# Patient Record
Sex: MALE | Race: OTHER | HISPANIC OR LATINO | URBAN - METROPOLITAN AREA
[De-identification: names, ages, dates, MRNs, and addresses within clinical notes are randomized per-mention and may not be internally consistent; named-entity substitution may affect disease eponyms.]

---

## 2020-06-06 ENCOUNTER — INPATIENT (INPATIENT)
Facility: HOSPITAL | Age: 45
LOS: 3 days | Discharge: HOME | End: 2020-06-10
Attending: HOSPITALIST
Payer: MEDICAID

## 2020-06-06 PROCEDURE — 93970 EXTREMITY STUDY: CPT | Mod: 26

## 2020-06-06 PROCEDURE — 71045 X-RAY EXAM CHEST 1 VIEW: CPT | Mod: 26

## 2020-06-06 PROCEDURE — 74177 CT ABD & PELVIS W/CONTRAST: CPT | Mod: 26

## 2020-06-06 PROCEDURE — 99285 EMERGENCY DEPT VISIT HI MDM: CPT

## 2020-06-07 PROCEDURE — 76705 ECHO EXAM OF ABDOMEN: CPT | Mod: 26

## 2021-06-25 ENCOUNTER — INPATIENT (INPATIENT)
Facility: HOSPITAL | Age: 46
LOS: 4 days | Discharge: HOME | End: 2021-06-30
Admitting: STUDENT IN AN ORGANIZED HEALTH CARE EDUCATION/TRAINING PROGRAM
Payer: MEDICAID

## 2021-06-25 PROCEDURE — 74177 CT ABD & PELVIS W/CONTRAST: CPT | Mod: 26

## 2021-06-25 PROCEDURE — 76705 ECHO EXAM OF ABDOMEN: CPT | Mod: 26

## 2021-06-25 PROCEDURE — 93010 ELECTROCARDIOGRAM REPORT: CPT

## 2021-06-25 PROCEDURE — 99284 EMERGENCY DEPT VISIT MOD MDM: CPT

## 2021-06-25 PROCEDURE — 93970 EXTREMITY STUDY: CPT | Mod: 26

## 2021-06-28 PROCEDURE — 49083 ABD PARACENTESIS W/IMAGING: CPT

## 2022-01-01 ENCOUNTER — INPATIENT (INPATIENT)
Facility: HOSPITAL | Age: 47
LOS: 30 days | DRG: 981 | End: 2022-09-20
Attending: HOSPITALIST | Admitting: STUDENT IN AN ORGANIZED HEALTH CARE EDUCATION/TRAINING PROGRAM
Payer: MEDICAID

## 2022-01-01 VITALS
HEART RATE: 67 BPM | SYSTOLIC BLOOD PRESSURE: 130 MMHG | DIASTOLIC BLOOD PRESSURE: 80 MMHG | WEIGHT: 139.99 LBS | OXYGEN SATURATION: 99 % | TEMPERATURE: 98 F | RESPIRATION RATE: 20 BRPM

## 2022-01-01 VITALS
SYSTOLIC BLOOD PRESSURE: 68 MMHG | DIASTOLIC BLOOD PRESSURE: 36 MMHG | RESPIRATION RATE: 18 BRPM | HEART RATE: 52 BPM | OXYGEN SATURATION: 100 %

## 2022-01-01 DIAGNOSIS — R09.89 OTHER SPECIFIED SYMPTOMS AND SIGNS INVOLVING THE CIRCULATORY AND RESPIRATORY SYSTEMS: ICD-10-CM

## 2022-01-01 DIAGNOSIS — K70.30 ALCOHOLIC CIRRHOSIS OF LIVER WITHOUT ASCITES: ICD-10-CM

## 2022-01-01 DIAGNOSIS — K72.10 CHRONIC HEPATIC FAILURE WITHOUT COMA: ICD-10-CM

## 2022-01-01 LAB
% ALBUMIN: 45.2 % — SIGNIFICANT CHANGE UP
% ALPHA 1: 5.3 % — SIGNIFICANT CHANGE UP
% ALPHA 2: 5.1 % — SIGNIFICANT CHANGE UP
% BETA: 10.9 % — SIGNIFICANT CHANGE UP
% GAMMA: 33.5 % — SIGNIFICANT CHANGE UP
% M SPIKE: SIGNIFICANT CHANGE UP
ACANTHOCYTES BLD QL SMEAR: SIGNIFICANT CHANGE UP
ACANTHOCYTES BLD QL SMEAR: SLIGHT — SIGNIFICANT CHANGE UP
ACANTHOCYTES BLD QL SMEAR: SLIGHT — SIGNIFICANT CHANGE UP
AFP-TM SERPL-MCNC: 6.2 NG/ML — SIGNIFICANT CHANGE UP
ALBUMIN FLD-MCNC: <0.2 G/DL — SIGNIFICANT CHANGE UP
ALBUMIN SERPL ELPH-MCNC: 1.8 G/DL — LOW (ref 3.3–5.2)
ALBUMIN SERPL ELPH-MCNC: 2 G/DL — LOW (ref 3.3–5.2)
ALBUMIN SERPL ELPH-MCNC: 2.1 G/DL — LOW (ref 3.3–5.2)
ALBUMIN SERPL ELPH-MCNC: 2.2 G/DL — LOW (ref 3.3–5.2)
ALBUMIN SERPL ELPH-MCNC: 2.3 G/DL — LOW (ref 3.3–5.2)
ALBUMIN SERPL ELPH-MCNC: 2.3 G/DL — LOW (ref 3.3–5.2)
ALBUMIN SERPL ELPH-MCNC: 2.4 G/DL — LOW (ref 3.6–5.5)
ALBUMIN SERPL ELPH-MCNC: 2.6 G/DL — LOW (ref 3.3–5.2)
ALBUMIN SERPL ELPH-MCNC: 2.6 G/DL — LOW (ref 3.3–5.2)
ALBUMIN SERPL ELPH-MCNC: 2.8 G/DL — LOW (ref 3.3–5.2)
ALBUMIN SERPL ELPH-MCNC: 2.8 G/DL — LOW (ref 3.3–5.2)
ALBUMIN SERPL ELPH-MCNC: 3 G/DL — LOW (ref 3.3–5.2)
ALBUMIN SERPL ELPH-MCNC: 3 G/DL — LOW (ref 3.3–5.2)
ALBUMIN SERPL ELPH-MCNC: 3.2 G/DL — LOW (ref 3.3–5.2)
ALBUMIN SERPL ELPH-MCNC: 3.2 G/DL — LOW (ref 3.3–5.2)
ALBUMIN, RANDOM URINE: 8.3 MG/DL — SIGNIFICANT CHANGE UP
ALBUMIN/CREATININE RATIO (ACR): 159 MG/G — HIGH (ref 0–30)
ALBUMIN/GLOB SERPL ELPH: 0.8 RATIO — SIGNIFICANT CHANGE UP
ALP SERPL-CCNC: 226 U/L — HIGH (ref 40–120)
ALP SERPL-CCNC: 230 U/L — HIGH (ref 40–120)
ALP SERPL-CCNC: 236 U/L — HIGH (ref 40–120)
ALP SERPL-CCNC: 243 U/L — HIGH (ref 40–120)
ALP SERPL-CCNC: 252 U/L — HIGH (ref 40–120)
ALP SERPL-CCNC: 257 U/L — HIGH (ref 40–120)
ALP SERPL-CCNC: 259 U/L — HIGH (ref 40–120)
ALP SERPL-CCNC: 259 U/L — HIGH (ref 40–120)
ALP SERPL-CCNC: 261 U/L — HIGH (ref 40–120)
ALP SERPL-CCNC: 264 U/L — HIGH (ref 40–120)
ALP SERPL-CCNC: 264 U/L — HIGH (ref 40–120)
ALP SERPL-CCNC: 265 U/L — HIGH (ref 40–120)
ALP SERPL-CCNC: 272 U/L — HIGH (ref 40–120)
ALP SERPL-CCNC: 279 U/L — HIGH (ref 40–120)
ALP SERPL-CCNC: 281 U/L — HIGH (ref 40–120)
ALP SERPL-CCNC: 282 U/L — HIGH (ref 40–120)
ALP SERPL-CCNC: 284 U/L — HIGH (ref 40–120)
ALP SERPL-CCNC: 291 U/L — HIGH (ref 40–120)
ALP SERPL-CCNC: 294 U/L — HIGH (ref 40–120)
ALP SERPL-CCNC: 301 U/L — HIGH (ref 40–120)
ALP SERPL-CCNC: 303 U/L — HIGH (ref 40–120)
ALP SERPL-CCNC: 327 U/L — HIGH (ref 40–120)
ALP SERPL-CCNC: 329 U/L — HIGH (ref 40–120)
ALPHA1 GLOB SERPL ELPH-MCNC: 0.3 G/DL — SIGNIFICANT CHANGE UP (ref 0.1–0.4)
ALPHA2 GLOB SERPL ELPH-MCNC: 0.3 G/DL — LOW (ref 0.5–1)
ALT FLD-CCNC: 101 U/L — HIGH
ALT FLD-CCNC: 104 U/L — HIGH
ALT FLD-CCNC: 107 U/L — HIGH
ALT FLD-CCNC: 108 U/L — HIGH
ALT FLD-CCNC: 109 U/L — HIGH
ALT FLD-CCNC: 113 U/L — HIGH
ALT FLD-CCNC: 19 U/L — SIGNIFICANT CHANGE UP
ALT FLD-CCNC: 39 U/L — SIGNIFICANT CHANGE UP
ALT FLD-CCNC: 39 U/L — SIGNIFICANT CHANGE UP
ALT FLD-CCNC: 40 U/L — SIGNIFICANT CHANGE UP
ALT FLD-CCNC: 48 U/L — HIGH
ALT FLD-CCNC: 54 U/L — HIGH
ALT FLD-CCNC: 65 U/L — HIGH
ALT FLD-CCNC: 75 U/L — HIGH
ALT FLD-CCNC: 85 U/L — HIGH
ALT FLD-CCNC: 86 U/L — HIGH
ALT FLD-CCNC: 90 U/L — HIGH
ALT FLD-CCNC: 90 U/L — HIGH
ALT FLD-CCNC: 94 U/L — HIGH
ALT FLD-CCNC: 96 U/L — HIGH
ALT FLD-CCNC: 96 U/L — HIGH
ALT FLD-CCNC: 97 U/L — HIGH
ALT FLD-CCNC: 97 U/L — HIGH
AMMONIA BLD-MCNC: 54 UMOL/L — SIGNIFICANT CHANGE UP (ref 11–55)
AMMONIA BLD-MCNC: 55 UMOL/L — SIGNIFICANT CHANGE UP (ref 11–55)
AMMONIA BLD-MCNC: 61 UMOL/L — HIGH (ref 11–55)
AMMONIA BLD-MCNC: 69 UMOL/L — HIGH (ref 11–55)
AMMONIA BLD-MCNC: 76 UMOL/L — HIGH (ref 11–55)
AMMONIA BLD-MCNC: 85 UMOL/L — HIGH (ref 11–55)
AMMONIA BLD-MCNC: 87 UMOL/L — HIGH (ref 11–55)
ANION GAP SERPL CALC-SCNC: 10 MMOL/L — SIGNIFICANT CHANGE UP (ref 5–17)
ANION GAP SERPL CALC-SCNC: 11 MMOL/L — SIGNIFICANT CHANGE UP (ref 5–17)
ANION GAP SERPL CALC-SCNC: 12 MMOL/L — SIGNIFICANT CHANGE UP (ref 5–17)
ANION GAP SERPL CALC-SCNC: 13 MMOL/L — SIGNIFICANT CHANGE UP (ref 5–17)
ANION GAP SERPL CALC-SCNC: 13 MMOL/L — SIGNIFICANT CHANGE UP (ref 5–17)
ANION GAP SERPL CALC-SCNC: 14 MMOL/L — SIGNIFICANT CHANGE UP (ref 5–17)
ANION GAP SERPL CALC-SCNC: 14 MMOL/L — SIGNIFICANT CHANGE UP (ref 5–17)
ANION GAP SERPL CALC-SCNC: 16 MMOL/L — SIGNIFICANT CHANGE UP (ref 5–17)
ANION GAP SERPL CALC-SCNC: 18 MMOL/L — HIGH (ref 5–17)
ANION GAP SERPL CALC-SCNC: 18 MMOL/L — HIGH (ref 5–17)
ANION GAP SERPL CALC-SCNC: 19 MMOL/L — HIGH (ref 5–17)
ANION GAP SERPL CALC-SCNC: 22 MMOL/L — HIGH (ref 5–17)
ANION GAP SERPL CALC-SCNC: 22 MMOL/L — HIGH (ref 5–17)
ANION GAP SERPL CALC-SCNC: 23 MMOL/L — HIGH (ref 5–17)
ANION GAP SERPL CALC-SCNC: 25 MMOL/L — HIGH (ref 5–17)
ANION GAP SERPL CALC-SCNC: 9 MMOL/L — SIGNIFICANT CHANGE UP (ref 5–17)
ANISOCYTOSIS BLD QL: SIGNIFICANT CHANGE UP
ANISOCYTOSIS BLD QL: SLIGHT — SIGNIFICANT CHANGE UP
ANISOCYTOSIS BLD QL: SLIGHT — SIGNIFICANT CHANGE UP
APPEARANCE UR: ABNORMAL
APPEARANCE UR: CLEAR — SIGNIFICANT CHANGE UP
APPEARANCE UR: CLEAR — SIGNIFICANT CHANGE UP
APTT BLD: 100.6 SEC — HIGH (ref 27.5–35.5)
APTT BLD: 59.3 SEC — HIGH (ref 27.5–35.5)
APTT BLD: 61.4 SEC — HIGH (ref 27.5–35.5)
APTT BLD: 66.8 SEC — HIGH (ref 27.5–35.5)
APTT BLD: 73 SEC — HIGH (ref 27.5–35.5)
APTT BLD: 90.1 SEC — HIGH (ref 27.5–35.5)
APTT BLD: 98.2 SEC — HIGH (ref 27.5–35.5)
APTT BLD: 99.7 SEC — HIGH (ref 27.5–35.5)
AST SERPL-CCNC: 100 U/L — HIGH
AST SERPL-CCNC: 100 U/L — HIGH
AST SERPL-CCNC: 102 U/L — HIGH
AST SERPL-CCNC: 104 U/L — HIGH
AST SERPL-CCNC: 104 U/L — HIGH
AST SERPL-CCNC: 105 U/L — HIGH
AST SERPL-CCNC: 110 U/L — HIGH
AST SERPL-CCNC: 116 U/L — HIGH
AST SERPL-CCNC: 117 U/L — HIGH
AST SERPL-CCNC: 119 U/L — HIGH
AST SERPL-CCNC: 122 U/L — HIGH
AST SERPL-CCNC: 129 U/L — HIGH
AST SERPL-CCNC: 132 U/L — HIGH
AST SERPL-CCNC: 135 U/L — HIGH
AST SERPL-CCNC: 162 U/L — HIGH
AST SERPL-CCNC: 84 U/L — HIGH
AST SERPL-CCNC: 85 U/L — HIGH
AST SERPL-CCNC: 88 U/L — HIGH
AST SERPL-CCNC: 91 U/L — HIGH
AST SERPL-CCNC: 96 U/L — HIGH
AST SERPL-CCNC: 97 U/L — HIGH
AST SERPL-CCNC: 98 U/L — HIGH
AST SERPL-CCNC: 98 U/L — HIGH
B PERT IGG+IGM PNL SER: CLEAR — SIGNIFICANT CHANGE UP
B-GLOBULIN SERPL ELPH-MCNC: 0.6 G/DL — SIGNIFICANT CHANGE UP (ref 0.5–1)
BACTERIA # UR AUTO: ABNORMAL
BASO STIPL BLD QL SMEAR: PRESENT — SIGNIFICANT CHANGE UP
BASOPHILS # BLD AUTO: 0 K/UL — SIGNIFICANT CHANGE UP (ref 0–0.2)
BASOPHILS # BLD AUTO: 0.03 K/UL — SIGNIFICANT CHANGE UP (ref 0–0.2)
BASOPHILS # BLD AUTO: 0.27 K/UL — HIGH (ref 0–0.2)
BASOPHILS NFR BLD AUTO: 0 % — SIGNIFICANT CHANGE UP (ref 0–2)
BASOPHILS NFR BLD AUTO: 0.2 % — SIGNIFICANT CHANGE UP (ref 0–2)
BASOPHILS NFR BLD AUTO: 1.8 % — SIGNIFICANT CHANGE UP (ref 0–2)
BILIRUB DIRECT SERPL-MCNC: >10 MG/DL — HIGH (ref 0–0.3)
BILIRUB INDIRECT FLD-MCNC: SIGNIFICANT CHANGE UP MG/DL (ref 0.2–1)
BILIRUB SERPL-MCNC: 24.2 MG/DL — HIGH (ref 0.4–2)
BILIRUB SERPL-MCNC: 24.7 MG/DL — HIGH (ref 0.4–2)
BILIRUB SERPL-MCNC: 25.9 MG/DL — HIGH (ref 0.4–2)
BILIRUB SERPL-MCNC: 26.2 MG/DL — HIGH (ref 0.4–2)
BILIRUB SERPL-MCNC: 26.5 MG/DL — HIGH (ref 0.4–2)
BILIRUB SERPL-MCNC: 29.6 MG/DL — HIGH (ref 0.4–2)
BILIRUB SERPL-MCNC: 29.7 MG/DL — HIGH (ref 0.4–2)
BILIRUB SERPL-MCNC: 30.7 MG/DL — HIGH (ref 0.4–2)
BILIRUB SERPL-MCNC: 31.1 MG/DL — HIGH (ref 0.4–2)
BILIRUB SERPL-MCNC: 31.4 MG/DL — HIGH (ref 0.4–2)
BILIRUB SERPL-MCNC: 31.5 MG/DL — HIGH (ref 0.4–2)
BILIRUB SERPL-MCNC: 32 MG/DL — HIGH (ref 0.4–2)
BILIRUB SERPL-MCNC: 33.1 MG/DL — HIGH (ref 0.4–2)
BILIRUB SERPL-MCNC: 34.2 MG/DL — HIGH (ref 0.4–2)
BILIRUB SERPL-MCNC: 36.1 MG/DL — HIGH (ref 0.4–2)
BILIRUB SERPL-MCNC: 36.7 MG/DL — HIGH (ref 0.4–2)
BILIRUB SERPL-MCNC: 40.3 MG/DL — HIGH (ref 0.4–2)
BILIRUB SERPL-MCNC: 41.2 MG/DL — HIGH (ref 0.4–2)
BILIRUB SERPL-MCNC: 43 MG/DL — HIGH (ref 0.4–2)
BILIRUB SERPL-MCNC: 43.9 MG/DL — HIGH (ref 0.4–2)
BILIRUB SERPL-MCNC: 45.1 MG/DL — HIGH (ref 0.4–2)
BILIRUB SERPL-MCNC: 46.1 MG/DL — HIGH (ref 0.4–2)
BILIRUB SERPL-MCNC: 48.9 MG/DL — HIGH (ref 0.4–2)
BILIRUB UR-MCNC: ABNORMAL
BILIRUB UR-MCNC: ABNORMAL
BILIRUB UR-MCNC: NEGATIVE — SIGNIFICANT CHANGE UP
BLD GP AB SCN SERPL QL: SIGNIFICANT CHANGE UP
BUN SERPL-MCNC: 12.2 MG/DL — SIGNIFICANT CHANGE UP (ref 8–20)
BUN SERPL-MCNC: 12.7 MG/DL — SIGNIFICANT CHANGE UP (ref 8–20)
BUN SERPL-MCNC: 120.3 MG/DL — HIGH (ref 8–20)
BUN SERPL-MCNC: 123.7 MG/DL — HIGH (ref 8–20)
BUN SERPL-MCNC: 133.5 MG/DL — HIGH (ref 8–20)
BUN SERPL-MCNC: 139.4 MG/DL — HIGH (ref 8–20)
BUN SERPL-MCNC: 16.9 MG/DL — SIGNIFICANT CHANGE UP (ref 8–20)
BUN SERPL-MCNC: 162.8 MG/DL — HIGH (ref 8–20)
BUN SERPL-MCNC: 18.3 MG/DL — SIGNIFICANT CHANGE UP (ref 8–20)
BUN SERPL-MCNC: 23.3 MG/DL — HIGH (ref 8–20)
BUN SERPL-MCNC: 24 MG/DL — HIGH (ref 8–20)
BUN SERPL-MCNC: 24.3 MG/DL — HIGH (ref 8–20)
BUN SERPL-MCNC: 24.4 MG/DL — HIGH (ref 8–20)
BUN SERPL-MCNC: 31.5 MG/DL — HIGH (ref 8–20)
BUN SERPL-MCNC: 31.9 MG/DL — HIGH (ref 8–20)
BUN SERPL-MCNC: 33.6 MG/DL — HIGH (ref 8–20)
BUN SERPL-MCNC: 44.4 MG/DL — HIGH (ref 8–20)
BUN SERPL-MCNC: 48.9 MG/DL — HIGH (ref 8–20)
BUN SERPL-MCNC: 49 MG/DL — HIGH (ref 8–20)
BUN SERPL-MCNC: 49.7 MG/DL — HIGH (ref 8–20)
BUN SERPL-MCNC: 51.6 MG/DL — HIGH (ref 8–20)
BUN SERPL-MCNC: 58.7 MG/DL — HIGH (ref 8–20)
BUN SERPL-MCNC: 67.9 MG/DL — HIGH (ref 8–20)
BUN SERPL-MCNC: 7.3 MG/DL — LOW (ref 8–20)
BUN SERPL-MCNC: 78.4 MG/DL — HIGH (ref 8–20)
BUN SERPL-MCNC: 8.7 MG/DL — SIGNIFICANT CHANGE UP (ref 8–20)
BUN SERPL-MCNC: 82.4 MG/DL — HIGH (ref 8–20)
BUN SERPL-MCNC: 89.5 MG/DL — HIGH (ref 8–20)
BUN SERPL-MCNC: 9.1 MG/DL — SIGNIFICANT CHANGE UP (ref 8–20)
BURR CELLS BLD QL SMEAR: PRESENT — SIGNIFICANT CHANGE UP
CALCIUM SERPL-MCNC: 7.1 MG/DL — LOW (ref 8.4–10.5)
CALCIUM SERPL-MCNC: 7.2 MG/DL — LOW (ref 8.4–10.5)
CALCIUM SERPL-MCNC: 7.3 MG/DL — LOW (ref 8.4–10.5)
CALCIUM SERPL-MCNC: 7.3 MG/DL — LOW (ref 8.4–10.5)
CALCIUM SERPL-MCNC: 7.4 MG/DL — LOW (ref 8.4–10.5)
CALCIUM SERPL-MCNC: 7.5 MG/DL — LOW (ref 8.4–10.5)
CALCIUM SERPL-MCNC: 7.5 MG/DL — LOW (ref 8.4–10.5)
CALCIUM SERPL-MCNC: 7.6 MG/DL — LOW (ref 8.4–10.5)
CALCIUM SERPL-MCNC: 7.7 MG/DL — LOW (ref 8.4–10.5)
CALCIUM SERPL-MCNC: 7.8 MG/DL — LOW (ref 8.4–10.5)
CALCIUM SERPL-MCNC: 7.9 MG/DL — LOW (ref 8.4–10.5)
CALCIUM SERPL-MCNC: 7.9 MG/DL — LOW (ref 8.4–10.5)
CALCIUM SERPL-MCNC: 8 MG/DL — LOW (ref 8.4–10.5)
CALCIUM SERPL-MCNC: 8.1 MG/DL — LOW (ref 8.4–10.5)
CALCIUM SERPL-MCNC: 8.5 MG/DL — SIGNIFICANT CHANGE UP (ref 8.4–10.5)
CALCIUM SERPL-MCNC: 8.8 MG/DL — SIGNIFICANT CHANGE UP (ref 8.4–10.5)
CALCIUM SERPL-MCNC: 9.1 MG/DL — SIGNIFICANT CHANGE UP (ref 8.4–10.5)
CHLORIDE SERPL-SCNC: 100 MMOL/L — SIGNIFICANT CHANGE UP (ref 98–107)
CHLORIDE SERPL-SCNC: 101 MMOL/L — SIGNIFICANT CHANGE UP (ref 98–107)
CHLORIDE SERPL-SCNC: 81 MMOL/L — LOW (ref 98–107)
CHLORIDE SERPL-SCNC: 84 MMOL/L — LOW (ref 98–107)
CHLORIDE SERPL-SCNC: 85 MMOL/L — LOW (ref 98–107)
CHLORIDE SERPL-SCNC: 85 MMOL/L — LOW (ref 98–107)
CHLORIDE SERPL-SCNC: 86 MMOL/L — LOW (ref 98–107)
CHLORIDE SERPL-SCNC: 88 MMOL/L — LOW (ref 98–107)
CHLORIDE SERPL-SCNC: 89 MMOL/L — LOW (ref 98–107)
CHLORIDE SERPL-SCNC: 90 MMOL/L — LOW (ref 98–107)
CHLORIDE SERPL-SCNC: 90 MMOL/L — LOW (ref 98–107)
CHLORIDE SERPL-SCNC: 92 MMOL/L — LOW (ref 98–107)
CHLORIDE SERPL-SCNC: 92 MMOL/L — LOW (ref 98–107)
CHLORIDE SERPL-SCNC: 93 MMOL/L — LOW (ref 98–107)
CHLORIDE SERPL-SCNC: 94 MMOL/L — LOW (ref 98–107)
CHLORIDE SERPL-SCNC: 95 MMOL/L — LOW (ref 98–107)
CHLORIDE SERPL-SCNC: 95 MMOL/L — LOW (ref 98–107)
CHLORIDE SERPL-SCNC: 96 MMOL/L — LOW (ref 98–107)
CHLORIDE SERPL-SCNC: 96 MMOL/L — LOW (ref 98–107)
CHLORIDE SERPL-SCNC: 97 MMOL/L — LOW (ref 98–107)
CHLORIDE SERPL-SCNC: 98 MMOL/L — SIGNIFICANT CHANGE UP (ref 98–107)
CHLORIDE SERPL-SCNC: 99 MMOL/L — SIGNIFICANT CHANGE UP (ref 98–107)
CHOLEST SERPL-MCNC: 75 MG/DL — SIGNIFICANT CHANGE UP
CO2 SERPL-SCNC: 14 MMOL/L — LOW (ref 22–29)
CO2 SERPL-SCNC: 14 MMOL/L — LOW (ref 22–29)
CO2 SERPL-SCNC: 16 MMOL/L — LOW (ref 22–29)
CO2 SERPL-SCNC: 17 MMOL/L — LOW (ref 22–29)
CO2 SERPL-SCNC: 18 MMOL/L — LOW (ref 22–29)
CO2 SERPL-SCNC: 18 MMOL/L — LOW (ref 22–29)
CO2 SERPL-SCNC: 19 MMOL/L — LOW (ref 22–29)
CO2 SERPL-SCNC: 20 MMOL/L — LOW (ref 22–29)
CO2 SERPL-SCNC: 20 MMOL/L — LOW (ref 22–29)
CO2 SERPL-SCNC: 21 MMOL/L — LOW (ref 22–29)
CO2 SERPL-SCNC: 21 MMOL/L — LOW (ref 22–29)
CO2 SERPL-SCNC: 22 MMOL/L — SIGNIFICANT CHANGE UP (ref 22–29)
CO2 SERPL-SCNC: 23 MMOL/L — SIGNIFICANT CHANGE UP (ref 22–29)
CO2 SERPL-SCNC: 24 MMOL/L — SIGNIFICANT CHANGE UP (ref 22–29)
CO2 SERPL-SCNC: 25 MMOL/L — SIGNIFICANT CHANGE UP (ref 22–29)
CO2 SERPL-SCNC: 28 MMOL/L — SIGNIFICANT CHANGE UP (ref 22–29)
CO2 SERPL-SCNC: 28 MMOL/L — SIGNIFICANT CHANGE UP (ref 22–29)
COLOR FLD: YELLOW
COLOR SPEC: ABNORMAL
COLOR SPEC: YELLOW — SIGNIFICANT CHANGE UP
COLOR SPEC: YELLOW — SIGNIFICANT CHANGE UP
COMMENT - URINE: SIGNIFICANT CHANGE UP
CORTIS AM PEAK SERPL-MCNC: 14.8 UG/DL — SIGNIFICANT CHANGE UP (ref 6–18.4)
CREAT ?TM UR-MCNC: 52 MG/DL — SIGNIFICANT CHANGE UP
CREAT ?TM UR-MCNC: 55 MG/DL — SIGNIFICANT CHANGE UP
CREAT SERPL-MCNC: 0.3 MG/DL — LOW (ref 0.5–1.3)
CREAT SERPL-MCNC: 0.31 MG/DL — LOW (ref 0.5–1.3)
CREAT SERPL-MCNC: 0.36 MG/DL — LOW (ref 0.5–1.3)
CREAT SERPL-MCNC: 0.5 MG/DL — SIGNIFICANT CHANGE UP (ref 0.5–1.3)
CREAT SERPL-MCNC: 0.57 MG/DL — SIGNIFICANT CHANGE UP (ref 0.5–1.3)
CREAT SERPL-MCNC: 1 MG/DL — SIGNIFICANT CHANGE UP (ref 0.5–1.3)
CREAT SERPL-MCNC: 1.06 MG/DL — SIGNIFICANT CHANGE UP (ref 0.5–1.3)
CREAT SERPL-MCNC: 1.08 MG/DL — SIGNIFICANT CHANGE UP (ref 0.5–1.3)
CREAT SERPL-MCNC: 1.34 MG/DL — HIGH (ref 0.5–1.3)
CREAT SERPL-MCNC: 1.67 MG/DL — HIGH (ref 0.5–1.3)
CREAT SERPL-MCNC: 1.98 MG/DL — HIGH (ref 0.5–1.3)
CREAT SERPL-MCNC: 2.7 MG/DL — HIGH (ref 0.5–1.3)
CREAT SERPL-MCNC: 2.95 MG/DL — HIGH (ref 0.5–1.3)
CREAT SERPL-MCNC: 3.42 MG/DL — HIGH (ref 0.5–1.3)
CREAT SERPL-MCNC: 4.87 MG/DL — HIGH (ref 0.5–1.3)
CREAT SERPL-MCNC: 5.04 MG/DL — HIGH (ref 0.5–1.3)
CREAT SERPL-MCNC: 5.18 MG/DL — HIGH (ref 0.5–1.3)
CREAT SERPL-MCNC: 5.4 MG/DL — HIGH (ref 0.5–1.3)
CREAT SERPL-MCNC: 5.77 MG/DL — HIGH (ref 0.5–1.3)
CREAT SERPL-MCNC: <0.2 MG/DL — LOW (ref 0.5–1.3)
CULTURE RESULTS: SIGNIFICANT CHANGE UP
DACRYOCYTES BLD QL SMEAR: SLIGHT — SIGNIFICANT CHANGE UP
DIFF PNL FLD: ABNORMAL
DIFF PNL FLD: ABNORMAL
DIFF PNL FLD: NEGATIVE — SIGNIFICANT CHANGE UP
EGFR: 11 ML/MIN/1.73M2 — LOW
EGFR: 12 ML/MIN/1.73M2 — LOW
EGFR: 122 ML/MIN/1.73M2 — SIGNIFICANT CHANGE UP
EGFR: 127 ML/MIN/1.73M2 — SIGNIFICANT CHANGE UP
EGFR: 13 ML/MIN/1.73M2 — LOW
EGFR: 13 ML/MIN/1.73M2 — LOW
EGFR: 14 ML/MIN/1.73M2 — LOW
EGFR: 140 ML/MIN/1.73M2 — SIGNIFICANT CHANGE UP
EGFR: 146 ML/MIN/1.73M2 — SIGNIFICANT CHANGE UP
EGFR: 148 ML/MIN/1.73M2 — SIGNIFICANT CHANGE UP
EGFR: 167 ML/MIN/1.73M2 — SIGNIFICANT CHANGE UP
EGFR: 21 ML/MIN/1.73M2 — LOW
EGFR: 26 ML/MIN/1.73M2 — LOW
EGFR: 28 ML/MIN/1.73M2 — LOW
EGFR: 41 ML/MIN/1.73M2 — LOW
EGFR: 50 ML/MIN/1.73M2 — LOW
EGFR: 66 ML/MIN/1.73M2 — SIGNIFICANT CHANGE UP
EGFR: 85 ML/MIN/1.73M2 — SIGNIFICANT CHANGE UP
EGFR: 87 ML/MIN/1.73M2 — SIGNIFICANT CHANGE UP
EGFR: 93 ML/MIN/1.73M2 — SIGNIFICANT CHANGE UP
EOSINOPHIL # BLD AUTO: 0 K/UL — SIGNIFICANT CHANGE UP (ref 0–0.5)
EOSINOPHIL # BLD AUTO: 0.01 K/UL — SIGNIFICANT CHANGE UP (ref 0–0.5)
EOSINOPHIL # BLD AUTO: 0.12 K/UL — SIGNIFICANT CHANGE UP (ref 0–0.5)
EOSINOPHIL # BLD AUTO: 0.21 K/UL — SIGNIFICANT CHANGE UP (ref 0–0.5)
EOSINOPHIL # BLD AUTO: 0.26 K/UL — SIGNIFICANT CHANGE UP (ref 0–0.5)
EOSINOPHIL NFR BLD AUTO: 0 % — SIGNIFICANT CHANGE UP (ref 0–6)
EOSINOPHIL NFR BLD AUTO: 0.1 % — SIGNIFICANT CHANGE UP (ref 0–6)
EOSINOPHIL NFR BLD AUTO: 0.9 % — SIGNIFICANT CHANGE UP (ref 0–6)
EOSINOPHIL NFR BLD AUTO: 1.4 % — SIGNIFICANT CHANGE UP (ref 0–6)
EOSINOPHIL NFR BLD AUTO: 1.7 % — SIGNIFICANT CHANGE UP (ref 0–6)
EPI CELLS # UR: SIGNIFICANT CHANGE UP
FERRITIN SERPL-MCNC: 4185 NG/ML — HIGH (ref 30–400)
FIBRINOGEN PPP-MCNC: 262 MG/DL — LOW (ref 330–520)
FLUID INTAKE SUBSTANCE CLASS: SIGNIFICANT CHANGE UP
FOLATE SERPL-MCNC: 12.5 NG/ML — SIGNIFICANT CHANGE UP
GAMMA GLOBULIN: 1.8 G/DL — HIGH (ref 0.6–1.6)
GLUCOSE FLD-MCNC: 158 MG/DL — SIGNIFICANT CHANGE UP
GLUCOSE SERPL-MCNC: 100 MG/DL — HIGH (ref 70–99)
GLUCOSE SERPL-MCNC: 107 MG/DL — HIGH (ref 70–99)
GLUCOSE SERPL-MCNC: 107 MG/DL — HIGH (ref 70–99)
GLUCOSE SERPL-MCNC: 111 MG/DL — HIGH (ref 70–99)
GLUCOSE SERPL-MCNC: 111 MG/DL — HIGH (ref 70–99)
GLUCOSE SERPL-MCNC: 118 MG/DL — HIGH (ref 70–99)
GLUCOSE SERPL-MCNC: 118 MG/DL — HIGH (ref 70–99)
GLUCOSE SERPL-MCNC: 120 MG/DL — HIGH (ref 70–99)
GLUCOSE SERPL-MCNC: 121 MG/DL — HIGH (ref 70–99)
GLUCOSE SERPL-MCNC: 122 MG/DL — HIGH (ref 70–99)
GLUCOSE SERPL-MCNC: 124 MG/DL — HIGH (ref 70–99)
GLUCOSE SERPL-MCNC: 125 MG/DL — HIGH (ref 70–99)
GLUCOSE SERPL-MCNC: 129 MG/DL — HIGH (ref 70–99)
GLUCOSE SERPL-MCNC: 130 MG/DL — HIGH (ref 70–99)
GLUCOSE SERPL-MCNC: 133 MG/DL — HIGH (ref 70–99)
GLUCOSE SERPL-MCNC: 136 MG/DL — HIGH (ref 70–99)
GLUCOSE SERPL-MCNC: 138 MG/DL — HIGH (ref 70–99)
GLUCOSE SERPL-MCNC: 139 MG/DL — HIGH (ref 70–99)
GLUCOSE SERPL-MCNC: 141 MG/DL — HIGH (ref 70–99)
GLUCOSE SERPL-MCNC: 142 MG/DL — HIGH (ref 70–99)
GLUCOSE SERPL-MCNC: 144 MG/DL — HIGH (ref 70–99)
GLUCOSE SERPL-MCNC: 144 MG/DL — HIGH (ref 70–99)
GLUCOSE SERPL-MCNC: 149 MG/DL — HIGH (ref 70–99)
GLUCOSE SERPL-MCNC: 151 MG/DL — HIGH (ref 70–99)
GLUCOSE SERPL-MCNC: 151 MG/DL — HIGH (ref 70–99)
GLUCOSE SERPL-MCNC: 154 MG/DL — HIGH (ref 70–99)
GLUCOSE SERPL-MCNC: 161 MG/DL — HIGH (ref 70–99)
GLUCOSE UR QL: NEGATIVE MG/DL — SIGNIFICANT CHANGE UP
GLUCOSE UR QL: NEGATIVE — SIGNIFICANT CHANGE UP
GLUCOSE UR QL: NEGATIVE — SIGNIFICANT CHANGE UP
GRAM STN FLD: SIGNIFICANT CHANGE UP
GRAN CASTS # UR COMP ASSIST: ABNORMAL /LPF
GRAN CASTS # UR COMP ASSIST: ABNORMAL /LPF
HAV IGM SER-ACNC: SIGNIFICANT CHANGE UP
HBV CORE IGM SER-ACNC: SIGNIFICANT CHANGE UP
HBV SURFACE AG SER-ACNC: SIGNIFICANT CHANGE UP
HCT VFR BLD CALC: 15.8 % — CRITICAL LOW (ref 39–50)
HCT VFR BLD CALC: 20.4 % — CRITICAL LOW (ref 39–50)
HCT VFR BLD CALC: 20.5 % — CRITICAL LOW (ref 39–50)
HCT VFR BLD CALC: 20.8 % — CRITICAL LOW (ref 39–50)
HCT VFR BLD CALC: 23.3 % — LOW (ref 39–50)
HCT VFR BLD CALC: 24 % — LOW (ref 39–50)
HCT VFR BLD CALC: 24.5 % — LOW (ref 39–50)
HCT VFR BLD CALC: 25.1 % — LOW (ref 39–50)
HCT VFR BLD CALC: 26.8 % — LOW (ref 39–50)
HCT VFR BLD CALC: 27.9 % — LOW (ref 39–50)
HCT VFR BLD CALC: 28.1 % — LOW (ref 39–50)
HCT VFR BLD CALC: 28.5 % — LOW (ref 39–50)
HCT VFR BLD CALC: 28.8 % — LOW (ref 39–50)
HCT VFR BLD CALC: 28.9 % — LOW (ref 39–50)
HCT VFR BLD CALC: 28.9 % — LOW (ref 39–50)
HCT VFR BLD CALC: 29.1 % — LOW (ref 39–50)
HCT VFR BLD CALC: 29.4 % — LOW (ref 39–50)
HCT VFR BLD CALC: 29.6 % — LOW (ref 39–50)
HCT VFR BLD CALC: 29.6 % — LOW (ref 39–50)
HCT VFR BLD CALC: 30 % — LOW (ref 39–50)
HCT VFR BLD CALC: 30.6 % — LOW (ref 39–50)
HCT VFR BLD CALC: 30.6 % — LOW (ref 39–50)
HCT VFR BLD CALC: 30.7 % — LOW (ref 39–50)
HCT VFR BLD CALC: 31.2 % — LOW (ref 39–50)
HCT VFR BLD CALC: 31.7 % — LOW (ref 39–50)
HCT VFR BLD CALC: 32.1 % — LOW (ref 39–50)
HCT VFR BLD CALC: 32.2 % — LOW (ref 39–50)
HCT VFR BLD CALC: 32.4 % — LOW (ref 39–50)
HCV AB S/CO SERPL IA: 0.17 S/CO — SIGNIFICANT CHANGE UP (ref 0–0.99)
HCV AB SERPL-IMP: SIGNIFICANT CHANGE UP
HDLC SERPL-MCNC: 12 MG/DL — LOW
HGB BLD-MCNC: 10 G/DL — LOW (ref 13–17)
HGB BLD-MCNC: 10.2 G/DL — LOW (ref 13–17)
HGB BLD-MCNC: 10.4 G/DL — LOW (ref 13–17)
HGB BLD-MCNC: 10.5 G/DL — LOW (ref 13–17)
HGB BLD-MCNC: 10.6 G/DL — LOW (ref 13–17)
HGB BLD-MCNC: 10.6 G/DL — LOW (ref 13–17)
HGB BLD-MCNC: 11.2 G/DL — LOW (ref 13–17)
HGB BLD-MCNC: 11.2 G/DL — LOW (ref 13–17)
HGB BLD-MCNC: 11.3 G/DL — LOW (ref 13–17)
HGB BLD-MCNC: 11.4 G/DL — LOW (ref 13–17)
HGB BLD-MCNC: 11.4 G/DL — LOW (ref 13–17)
HGB BLD-MCNC: 11.8 G/DL — LOW (ref 13–17)
HGB BLD-MCNC: 5.6 G/DL — CRITICAL LOW (ref 13–17)
HGB BLD-MCNC: 7.3 G/DL — LOW (ref 13–17)
HGB BLD-MCNC: 7.4 G/DL — LOW (ref 13–17)
HGB BLD-MCNC: 7.6 G/DL — LOW (ref 13–17)
HGB BLD-MCNC: 8.6 G/DL — LOW (ref 13–17)
HGB BLD-MCNC: 8.8 G/DL — LOW (ref 13–17)
HGB BLD-MCNC: 9.1 G/DL — LOW (ref 13–17)
HGB BLD-MCNC: 9.2 G/DL — LOW (ref 13–17)
HGB BLD-MCNC: 9.4 G/DL — LOW (ref 13–17)
HGB BLD-MCNC: 9.5 G/DL — LOW (ref 13–17)
HGB BLD-MCNC: 9.6 G/DL — LOW (ref 13–17)
HGB BLD-MCNC: 9.6 G/DL — LOW (ref 13–17)
HYALINE CASTS # UR AUTO: ABNORMAL /LPF
IMM GRANULOCYTES NFR BLD AUTO: 2 % — HIGH (ref 0–0.9)
IMM GRANULOCYTES NFR BLD AUTO: 2.3 % — HIGH (ref 0–1.5)
IMM GRANULOCYTES NFR BLD AUTO: 3.5 % — HIGH (ref 0–1.5)
INR BLD: 2.02 RATIO — HIGH (ref 0.88–1.16)
INR BLD: 2.06 RATIO — HIGH (ref 0.88–1.16)
INR BLD: 2.22 RATIO — HIGH (ref 0.88–1.16)
INR BLD: 2.26 RATIO — HIGH (ref 0.88–1.16)
INR BLD: 2.28 RATIO — HIGH (ref 0.88–1.16)
INR BLD: 2.3 RATIO — HIGH (ref 0.88–1.16)
INR BLD: 2.33 RATIO — HIGH (ref 0.88–1.16)
INR BLD: 2.36 RATIO — HIGH (ref 0.88–1.16)
INR BLD: 2.39 RATIO — HIGH (ref 0.88–1.16)
INR BLD: 2.53 RATIO — HIGH (ref 0.88–1.16)
INR BLD: 2.56 RATIO — HIGH (ref 0.88–1.16)
INR BLD: 2.58 RATIO — HIGH (ref 0.88–1.16)
INR BLD: 2.58 RATIO — HIGH (ref 0.88–1.16)
INR BLD: 2.67 RATIO — HIGH (ref 0.88–1.16)
INR BLD: 2.72 RATIO — HIGH (ref 0.88–1.16)
INR BLD: 2.82 RATIO — HIGH (ref 0.88–1.16)
INR BLD: 2.82 RATIO — HIGH (ref 0.88–1.16)
INR BLD: 2.83 RATIO — HIGH (ref 0.88–1.16)
INR BLD: 2.9 RATIO — HIGH (ref 0.88–1.16)
INR BLD: 2.96 RATIO — HIGH (ref 0.88–1.16)
INR BLD: 3.09 RATIO — HIGH (ref 0.88–1.16)
INR BLD: 3.14 RATIO — HIGH (ref 0.88–1.16)
INR BLD: 3.36 RATIO — HIGH (ref 0.88–1.16)
INTERPRETATION SERPL IFE-IMP: SIGNIFICANT CHANGE UP
IRON SATN MFR SERPL: 46 % — SIGNIFICANT CHANGE UP (ref 16–55)
IRON SATN MFR SERPL: 59 UG/DL — SIGNIFICANT CHANGE UP (ref 59–158)
KAPPA LC SER QL IFE: 10.51 MG/DL — HIGH (ref 0.33–1.94)
KAPPA/LAMBDA FREE LIGHT CHAIN RATIO, SERUM: 0.96 RATIO — SIGNIFICANT CHANGE UP (ref 0.26–1.65)
KETONES UR-MCNC: NEGATIVE — SIGNIFICANT CHANGE UP
LAMBDA LC SER QL IFE: 10.92 MG/DL — HIGH (ref 0.57–2.63)
LDH SERPL L TO P-CCNC: 26 U/L — SIGNIFICANT CHANGE UP
LEUKOCYTE ESTERASE UR-ACNC: ABNORMAL
LEUKOCYTE ESTERASE UR-ACNC: ABNORMAL
LEUKOCYTE ESTERASE UR-ACNC: NEGATIVE — SIGNIFICANT CHANGE UP
LIDOCAIN IGE QN: 52 U/L — HIGH (ref 22–51)
LIPID PNL WITH DIRECT LDL SERPL: SIGNIFICANT CHANGE UP MG/DL
LYMPHOCYTES # BLD AUTO: 0.43 K/UL — LOW (ref 1–3.3)
LYMPHOCYTES # BLD AUTO: 0.53 K/UL — LOW (ref 1–3.3)
LYMPHOCYTES # BLD AUTO: 0.57 K/UL — LOW (ref 1–3.3)
LYMPHOCYTES # BLD AUTO: 0.65 K/UL — LOW (ref 1–3.3)
LYMPHOCYTES # BLD AUTO: 0.73 K/UL — LOW (ref 1–3.3)
LYMPHOCYTES # BLD AUTO: 0.91 K/UL — LOW (ref 1–3.3)
LYMPHOCYTES # BLD AUTO: 0.99 K/UL — LOW (ref 1–3.3)
LYMPHOCYTES # BLD AUTO: 1.19 K/UL — SIGNIFICANT CHANGE UP (ref 1–3.3)
LYMPHOCYTES # BLD AUTO: 2.6 % — LOW (ref 13–44)
LYMPHOCYTES # BLD AUTO: 3.5 % — LOW (ref 13–44)
LYMPHOCYTES # BLD AUTO: 3.5 % — LOW (ref 13–44)
LYMPHOCYTES # BLD AUTO: 4.2 % — LOW (ref 13–44)
LYMPHOCYTES # BLD AUTO: 5.4 % — LOW (ref 13–44)
LYMPHOCYTES # BLD AUTO: 6.5 % — LOW (ref 13–44)
LYMPHOCYTES # BLD AUTO: 6.9 % — LOW (ref 13–44)
LYMPHOCYTES # BLD AUTO: 7.8 % — LOW (ref 13–44)
LYMPHOCYTES # FLD: 22 % — SIGNIFICANT CHANGE UP
M-SPIKE: SIGNIFICANT CHANGE UP (ref 0–0)
MACROCYTES BLD QL: SIGNIFICANT CHANGE UP
MACROCYTES BLD QL: SLIGHT — SIGNIFICANT CHANGE UP
MACROCYTES BLD QL: SLIGHT — SIGNIFICANT CHANGE UP
MAGNESIUM SERPL-MCNC: 1.8 MG/DL — SIGNIFICANT CHANGE UP (ref 1.6–2.6)
MAGNESIUM SERPL-MCNC: 1.9 MG/DL — SIGNIFICANT CHANGE UP (ref 1.6–2.6)
MAGNESIUM SERPL-MCNC: 1.9 MG/DL — SIGNIFICANT CHANGE UP (ref 1.6–2.6)
MAGNESIUM SERPL-MCNC: 2.1 MG/DL — SIGNIFICANT CHANGE UP (ref 1.8–2.6)
MAGNESIUM SERPL-MCNC: 2.2 MG/DL — SIGNIFICANT CHANGE UP (ref 1.6–2.6)
MAGNESIUM SERPL-MCNC: 2.3 MG/DL — SIGNIFICANT CHANGE UP (ref 1.6–2.6)
MAGNESIUM SERPL-MCNC: 2.7 MG/DL — HIGH (ref 1.8–2.6)
MANUAL SMEAR VERIFICATION: SIGNIFICANT CHANGE UP
MCHC RBC-ENTMCNC: 32.7 GM/DL — SIGNIFICANT CHANGE UP (ref 32–36)
MCHC RBC-ENTMCNC: 33.3 PG — SIGNIFICANT CHANGE UP (ref 27–34)
MCHC RBC-ENTMCNC: 33.8 PG — SIGNIFICANT CHANGE UP (ref 27–34)
MCHC RBC-ENTMCNC: 34.1 GM/DL — SIGNIFICANT CHANGE UP (ref 32–36)
MCHC RBC-ENTMCNC: 34.2 GM/DL — SIGNIFICANT CHANGE UP (ref 32–36)
MCHC RBC-ENTMCNC: 34.3 GM/DL — SIGNIFICANT CHANGE UP (ref 32–36)
MCHC RBC-ENTMCNC: 34.3 GM/DL — SIGNIFICANT CHANGE UP (ref 32–36)
MCHC RBC-ENTMCNC: 34.3 PG — HIGH (ref 27–34)
MCHC RBC-ENTMCNC: 34.4 GM/DL — SIGNIFICANT CHANGE UP (ref 32–36)
MCHC RBC-ENTMCNC: 34.4 PG — HIGH (ref 27–34)
MCHC RBC-ENTMCNC: 34.4 PG — HIGH (ref 27–34)
MCHC RBC-ENTMCNC: 34.5 GM/DL — SIGNIFICANT CHANGE UP (ref 32–36)
MCHC RBC-ENTMCNC: 34.6 GM/DL — SIGNIFICANT CHANGE UP (ref 32–36)
MCHC RBC-ENTMCNC: 34.7 GM/DL — SIGNIFICANT CHANGE UP (ref 32–36)
MCHC RBC-ENTMCNC: 34.9 PG — HIGH (ref 27–34)
MCHC RBC-ENTMCNC: 35.1 GM/DL — SIGNIFICANT CHANGE UP (ref 32–36)
MCHC RBC-ENTMCNC: 35.1 GM/DL — SIGNIFICANT CHANGE UP (ref 32–36)
MCHC RBC-ENTMCNC: 35.3 GM/DL — SIGNIFICANT CHANGE UP (ref 32–36)
MCHC RBC-ENTMCNC: 35.4 GM/DL — SIGNIFICANT CHANGE UP (ref 32–36)
MCHC RBC-ENTMCNC: 35.4 PG — HIGH (ref 27–34)
MCHC RBC-ENTMCNC: 35.5 GM/DL — SIGNIFICANT CHANGE UP (ref 32–36)
MCHC RBC-ENTMCNC: 35.5 PG — HIGH (ref 27–34)
MCHC RBC-ENTMCNC: 35.6 PG — HIGH (ref 27–34)
MCHC RBC-ENTMCNC: 35.6 PG — HIGH (ref 27–34)
MCHC RBC-ENTMCNC: 35.7 PG — HIGH (ref 27–34)
MCHC RBC-ENTMCNC: 35.8 GM/DL — SIGNIFICANT CHANGE UP (ref 32–36)
MCHC RBC-ENTMCNC: 35.8 GM/DL — SIGNIFICANT CHANGE UP (ref 32–36)
MCHC RBC-ENTMCNC: 35.8 PG — HIGH (ref 27–34)
MCHC RBC-ENTMCNC: 35.9 GM/DL — SIGNIFICANT CHANGE UP (ref 32–36)
MCHC RBC-ENTMCNC: 35.9 PG — HIGH (ref 27–34)
MCHC RBC-ENTMCNC: 35.9 PG — HIGH (ref 27–34)
MCHC RBC-ENTMCNC: 36 GM/DL — SIGNIFICANT CHANGE UP (ref 32–36)
MCHC RBC-ENTMCNC: 36 GM/DL — SIGNIFICANT CHANGE UP (ref 32–36)
MCHC RBC-ENTMCNC: 36 PG — HIGH (ref 27–34)
MCHC RBC-ENTMCNC: 36.1 GM/DL — HIGH (ref 32–36)
MCHC RBC-ENTMCNC: 36.1 PG — HIGH (ref 27–34)
MCHC RBC-ENTMCNC: 36.2 PG — HIGH (ref 27–34)
MCHC RBC-ENTMCNC: 36.3 GM/DL — HIGH (ref 32–36)
MCHC RBC-ENTMCNC: 36.5 GM/DL — HIGH (ref 32–36)
MCHC RBC-ENTMCNC: 36.5 PG — HIGH (ref 27–34)
MCHC RBC-ENTMCNC: 36.5 PG — HIGH (ref 27–34)
MCHC RBC-ENTMCNC: 36.6 GM/DL — HIGH (ref 32–36)
MCHC RBC-ENTMCNC: 36.6 PG — HIGH (ref 27–34)
MCHC RBC-ENTMCNC: 36.7 GM/DL — HIGH (ref 32–36)
MCHC RBC-ENTMCNC: 36.7 GM/DL — HIGH (ref 32–36)
MCHC RBC-ENTMCNC: 36.7 PG — HIGH (ref 27–34)
MCHC RBC-ENTMCNC: 36.8 GM/DL — HIGH (ref 32–36)
MCHC RBC-ENTMCNC: 36.8 PG — HIGH (ref 27–34)
MCHC RBC-ENTMCNC: 36.8 PG — HIGH (ref 27–34)
MCHC RBC-ENTMCNC: 36.9 GM/DL — HIGH (ref 32–36)
MCHC RBC-ENTMCNC: 36.9 PG — HIGH (ref 27–34)
MCHC RBC-ENTMCNC: 37.1 GM/DL — HIGH (ref 32–36)
MCV RBC AUTO: 100.3 FL — HIGH (ref 80–100)
MCV RBC AUTO: 100.3 FL — HIGH (ref 80–100)
MCV RBC AUTO: 100.6 FL — HIGH (ref 80–100)
MCV RBC AUTO: 101.1 FL — HIGH (ref 80–100)
MCV RBC AUTO: 102.2 FL — HIGH (ref 80–100)
MCV RBC AUTO: 102.3 FL — HIGH (ref 80–100)
MCV RBC AUTO: 102.6 FL — HIGH (ref 80–100)
MCV RBC AUTO: 102.6 FL — HIGH (ref 80–100)
MCV RBC AUTO: 102.8 FL — HIGH (ref 80–100)
MCV RBC AUTO: 103.2 FL — HIGH (ref 80–100)
MCV RBC AUTO: 103.4 FL — HIGH (ref 80–100)
MCV RBC AUTO: 103.6 FL — HIGH (ref 80–100)
MCV RBC AUTO: 103.6 FL — HIGH (ref 80–100)
MCV RBC AUTO: 104.2 FL — HIGH (ref 80–100)
MCV RBC AUTO: 104.9 FL — HIGH (ref 80–100)
MCV RBC AUTO: 104.9 FL — HIGH (ref 80–100)
MCV RBC AUTO: 105.2 FL — HIGH (ref 80–100)
MCV RBC AUTO: 106.3 FL — HIGH (ref 80–100)
MCV RBC AUTO: 108.9 FL — HIGH (ref 80–100)
MCV RBC AUTO: 92.8 FL — SIGNIFICANT CHANGE UP (ref 80–100)
MCV RBC AUTO: 94 FL — SIGNIFICANT CHANGE UP (ref 80–100)
MCV RBC AUTO: 94.1 FL — SIGNIFICANT CHANGE UP (ref 80–100)
MCV RBC AUTO: 94.4 FL — SIGNIFICANT CHANGE UP (ref 80–100)
MCV RBC AUTO: 95.3 FL — SIGNIFICANT CHANGE UP (ref 80–100)
MCV RBC AUTO: 96.5 FL — SIGNIFICANT CHANGE UP (ref 80–100)
MCV RBC AUTO: 96.8 FL — SIGNIFICANT CHANGE UP (ref 80–100)
MCV RBC AUTO: 96.9 FL — SIGNIFICANT CHANGE UP (ref 80–100)
MCV RBC AUTO: 98.3 FL — SIGNIFICANT CHANGE UP (ref 80–100)
MELD SCORE WITH DIALYSIS: >40 POINTS — SIGNIFICANT CHANGE UP
MELD SCORE WITHOUT DIALYSIS: 33 POINTS — SIGNIFICANT CHANGE UP
METAMYELOCYTES # FLD: 0.9 % — HIGH (ref 0–0)
METAMYELOCYTES # FLD: 0.9 % — HIGH (ref 0–0)
MONOCYTES # BLD AUTO: 0.14 K/UL — SIGNIFICANT CHANGE UP (ref 0–0.9)
MONOCYTES # BLD AUTO: 0.53 K/UL — SIGNIFICANT CHANGE UP (ref 0–0.9)
MONOCYTES # BLD AUTO: 0.57 K/UL — SIGNIFICANT CHANGE UP (ref 0–0.9)
MONOCYTES # BLD AUTO: 0.72 K/UL — SIGNIFICANT CHANGE UP (ref 0–0.9)
MONOCYTES # BLD AUTO: 0.81 K/UL — SIGNIFICANT CHANGE UP (ref 0–0.9)
MONOCYTES # BLD AUTO: 0.91 K/UL — HIGH (ref 0–0.9)
MONOCYTES # BLD AUTO: 1.12 K/UL — HIGH (ref 0–0.9)
MONOCYTES # BLD AUTO: 1.33 K/UL — HIGH (ref 0–0.9)
MONOCYTES NFR BLD AUTO: 0.9 % — LOW (ref 2–14)
MONOCYTES NFR BLD AUTO: 3.5 % — SIGNIFICANT CHANGE UP (ref 2–14)
MONOCYTES NFR BLD AUTO: 3.5 % — SIGNIFICANT CHANGE UP (ref 2–14)
MONOCYTES NFR BLD AUTO: 4.4 % — SIGNIFICANT CHANGE UP (ref 2–14)
MONOCYTES NFR BLD AUTO: 5.8 % — SIGNIFICANT CHANGE UP (ref 2–14)
MONOCYTES NFR BLD AUTO: 6.1 % — SIGNIFICANT CHANGE UP (ref 2–14)
MONOCYTES NFR BLD AUTO: 8.3 % — SIGNIFICANT CHANGE UP (ref 2–14)
MONOCYTES NFR BLD AUTO: 8.8 % — SIGNIFICANT CHANGE UP (ref 2–14)
MONOS+MACROS # FLD: 68 % — SIGNIFICANT CHANGE UP
MYELOCYTES NFR BLD: 0.8 % — HIGH (ref 0–0)
MYELOCYTES NFR BLD: 1.7 % — HIGH (ref 0–0)
MYELOCYTES NFR BLD: 2.6 % — HIGH (ref 0–0)
NEUTROPHILS # BLD AUTO: 11.02 K/UL — HIGH (ref 1.8–7.4)
NEUTROPHILS # BLD AUTO: 11.29 K/UL — HIGH (ref 1.8–7.4)
NEUTROPHILS # BLD AUTO: 12.21 K/UL — HIGH (ref 1.8–7.4)
NEUTROPHILS # BLD AUTO: 13.47 K/UL — HIGH (ref 1.8–7.4)
NEUTROPHILS # BLD AUTO: 13.66 K/UL — HIGH (ref 1.8–7.4)
NEUTROPHILS # BLD AUTO: 13.84 K/UL — HIGH (ref 1.8–7.4)
NEUTROPHILS # BLD AUTO: 14.75 K/UL — HIGH (ref 1.8–7.4)
NEUTROPHILS # BLD AUTO: 14.8 K/UL — HIGH (ref 1.8–7.4)
NEUTROPHILS NFR BLD AUTO: 78.3 % — HIGH (ref 43–77)
NEUTROPHILS NFR BLD AUTO: 80.8 % — HIGH (ref 43–77)
NEUTROPHILS NFR BLD AUTO: 84.1 % — HIGH (ref 43–77)
NEUTROPHILS NFR BLD AUTO: 85.2 % — HIGH (ref 43–77)
NEUTROPHILS NFR BLD AUTO: 86.2 % — HIGH (ref 43–77)
NEUTROPHILS NFR BLD AUTO: 90.4 % — HIGH (ref 43–77)
NEUTROPHILS NFR BLD AUTO: 91.2 % — HIGH (ref 43–77)
NEUTROPHILS NFR BLD AUTO: 91.3 % — HIGH (ref 43–77)
NEUTROPHILS-BODY FLUID: 10 % — SIGNIFICANT CHANGE UP
NEUTS BAND # BLD: 4.4 % — SIGNIFICANT CHANGE UP (ref 0–8)
NEUTS BAND # BLD: 5.2 % — SIGNIFICANT CHANGE UP (ref 0–8)
NITRITE UR-MCNC: NEGATIVE — SIGNIFICANT CHANGE UP
NITRITE UR-MCNC: POSITIVE
NITRITE UR-MCNC: POSITIVE
NON HDL CHOLESTEROL: 63 MG/DL — SIGNIFICANT CHANGE UP
NON-GYNECOLOGICAL CYTOLOGY STUDY: SIGNIFICANT CHANGE UP
NRBC # BLD: 1 /100 — HIGH (ref 0–0)
NRBC # BLD: 1 /100 — HIGH (ref 0–0)
OSMOLALITY SERPL: 298 MOSMOL/KG — SIGNIFICANT CHANGE UP (ref 275–300)
OSMOLALITY SERPL: 300 MOSMOL/KG — SIGNIFICANT CHANGE UP (ref 275–300)
OSMOLALITY UR: 342 MOSM/KG — SIGNIFICANT CHANGE UP (ref 300–1000)
OVALOCYTES BLD QL SMEAR: SLIGHT — SIGNIFICANT CHANGE UP
PH UR: 6 — SIGNIFICANT CHANGE UP (ref 5–8)
PH UR: 6 — SIGNIFICANT CHANGE UP (ref 5–8)
PH UR: 7 — SIGNIFICANT CHANGE UP (ref 5–8)
PHOSPHATE SERPL-MCNC: 2.5 MG/DL — SIGNIFICANT CHANGE UP (ref 2.4–4.7)
PLAT MORPH BLD: NORMAL — SIGNIFICANT CHANGE UP
PLATELET # BLD AUTO: 35 K/UL — LOW (ref 150–400)
PLATELET # BLD AUTO: 39 K/UL — LOW (ref 150–400)
PLATELET # BLD AUTO: 39 K/UL — LOW (ref 150–400)
PLATELET # BLD AUTO: 40 K/UL — LOW (ref 150–400)
PLATELET # BLD AUTO: 40 K/UL — LOW (ref 150–400)
PLATELET # BLD AUTO: 41 K/UL — LOW (ref 150–400)
PLATELET # BLD AUTO: 44 K/UL — LOW (ref 150–400)
PLATELET # BLD AUTO: 45 K/UL — LOW (ref 150–400)
PLATELET # BLD AUTO: 48 K/UL — LOW (ref 150–400)
PLATELET # BLD AUTO: 48 K/UL — LOW (ref 150–400)
PLATELET # BLD AUTO: 50 K/UL — LOW (ref 150–400)
PLATELET # BLD AUTO: 51 K/UL — LOW (ref 150–400)
PLATELET # BLD AUTO: 51 K/UL — LOW (ref 150–400)
PLATELET # BLD AUTO: 52 K/UL — LOW (ref 150–400)
PLATELET # BLD AUTO: 53 K/UL — LOW (ref 150–400)
PLATELET # BLD AUTO: 54 K/UL — LOW (ref 150–400)
PLATELET # BLD AUTO: 55 K/UL — LOW (ref 150–400)
PLATELET # BLD AUTO: 57 K/UL — LOW (ref 150–400)
PLATELET # BLD AUTO: 58 K/UL — LOW (ref 150–400)
PLATELET # BLD AUTO: 58 K/UL — LOW (ref 150–400)
PLATELET # BLD AUTO: 61 K/UL — LOW (ref 150–400)
PLATELET # BLD AUTO: 67 K/UL — LOW (ref 150–400)
PLATELET # BLD AUTO: 69 K/UL — LOW (ref 150–400)
PLATELET # BLD AUTO: 73 K/UL — LOW (ref 150–400)
POIKILOCYTOSIS BLD QL AUTO: SIGNIFICANT CHANGE UP
POIKILOCYTOSIS BLD QL AUTO: SLIGHT — SIGNIFICANT CHANGE UP
POLYCHROMASIA BLD QL SMEAR: SIGNIFICANT CHANGE UP
POLYCHROMASIA BLD QL SMEAR: SIGNIFICANT CHANGE UP
POLYCHROMASIA BLD QL SMEAR: SLIGHT — SIGNIFICANT CHANGE UP
POTASSIUM SERPL-MCNC: 3.1 MMOL/L — LOW (ref 3.5–5.3)
POTASSIUM SERPL-MCNC: 3.3 MMOL/L — LOW (ref 3.5–5.3)
POTASSIUM SERPL-MCNC: 3.4 MMOL/L — LOW (ref 3.5–5.3)
POTASSIUM SERPL-MCNC: 3.5 MMOL/L — SIGNIFICANT CHANGE UP (ref 3.5–5.3)
POTASSIUM SERPL-MCNC: 3.6 MMOL/L — SIGNIFICANT CHANGE UP (ref 3.5–5.3)
POTASSIUM SERPL-MCNC: 3.7 MMOL/L — SIGNIFICANT CHANGE UP (ref 3.5–5.3)
POTASSIUM SERPL-MCNC: 3.8 MMOL/L — SIGNIFICANT CHANGE UP (ref 3.5–5.3)
POTASSIUM SERPL-MCNC: 3.8 MMOL/L — SIGNIFICANT CHANGE UP (ref 3.5–5.3)
POTASSIUM SERPL-MCNC: 3.9 MMOL/L — SIGNIFICANT CHANGE UP (ref 3.5–5.3)
POTASSIUM SERPL-MCNC: 4 MMOL/L — SIGNIFICANT CHANGE UP (ref 3.5–5.3)
POTASSIUM SERPL-MCNC: 4 MMOL/L — SIGNIFICANT CHANGE UP (ref 3.5–5.3)
POTASSIUM SERPL-MCNC: 4.1 MMOL/L — SIGNIFICANT CHANGE UP (ref 3.5–5.3)
POTASSIUM SERPL-MCNC: 4.2 MMOL/L — SIGNIFICANT CHANGE UP (ref 3.5–5.3)
POTASSIUM SERPL-MCNC: 4.3 MMOL/L — SIGNIFICANT CHANGE UP (ref 3.5–5.3)
POTASSIUM SERPL-MCNC: 4.4 MMOL/L — SIGNIFICANT CHANGE UP (ref 3.5–5.3)
POTASSIUM SERPL-MCNC: 4.6 MMOL/L — SIGNIFICANT CHANGE UP (ref 3.5–5.3)
POTASSIUM SERPL-SCNC: 3.1 MMOL/L — LOW (ref 3.5–5.3)
POTASSIUM SERPL-SCNC: 3.3 MMOL/L — LOW (ref 3.5–5.3)
POTASSIUM SERPL-SCNC: 3.4 MMOL/L — LOW (ref 3.5–5.3)
POTASSIUM SERPL-SCNC: 3.5 MMOL/L — SIGNIFICANT CHANGE UP (ref 3.5–5.3)
POTASSIUM SERPL-SCNC: 3.6 MMOL/L — SIGNIFICANT CHANGE UP (ref 3.5–5.3)
POTASSIUM SERPL-SCNC: 3.7 MMOL/L — SIGNIFICANT CHANGE UP (ref 3.5–5.3)
POTASSIUM SERPL-SCNC: 3.8 MMOL/L — SIGNIFICANT CHANGE UP (ref 3.5–5.3)
POTASSIUM SERPL-SCNC: 3.8 MMOL/L — SIGNIFICANT CHANGE UP (ref 3.5–5.3)
POTASSIUM SERPL-SCNC: 3.9 MMOL/L — SIGNIFICANT CHANGE UP (ref 3.5–5.3)
POTASSIUM SERPL-SCNC: 4 MMOL/L — SIGNIFICANT CHANGE UP (ref 3.5–5.3)
POTASSIUM SERPL-SCNC: 4 MMOL/L — SIGNIFICANT CHANGE UP (ref 3.5–5.3)
POTASSIUM SERPL-SCNC: 4.1 MMOL/L — SIGNIFICANT CHANGE UP (ref 3.5–5.3)
POTASSIUM SERPL-SCNC: 4.2 MMOL/L — SIGNIFICANT CHANGE UP (ref 3.5–5.3)
POTASSIUM SERPL-SCNC: 4.3 MMOL/L — SIGNIFICANT CHANGE UP (ref 3.5–5.3)
POTASSIUM SERPL-SCNC: 4.4 MMOL/L — SIGNIFICANT CHANGE UP (ref 3.5–5.3)
POTASSIUM SERPL-SCNC: 4.6 MMOL/L — SIGNIFICANT CHANGE UP (ref 3.5–5.3)
PROT ?TM UR-MCNC: 49 MG/DL — HIGH (ref 0–12)
PROT FLD-MCNC: <1 G/DL — SIGNIFICANT CHANGE UP
PROT PATTERN SERPL ELPH-IMP: SIGNIFICANT CHANGE UP
PROT SERPL-MCNC: 4.5 G/DL — LOW (ref 6.6–8.7)
PROT SERPL-MCNC: 4.6 G/DL — LOW (ref 6.6–8.7)
PROT SERPL-MCNC: 4.6 G/DL — LOW (ref 6.6–8.7)
PROT SERPL-MCNC: 4.8 G/DL — LOW (ref 6.6–8.7)
PROT SERPL-MCNC: 4.9 G/DL — LOW (ref 6.6–8.7)
PROT SERPL-MCNC: 4.9 G/DL — LOW (ref 6.6–8.7)
PROT SERPL-MCNC: 5 G/DL — LOW (ref 6.6–8.7)
PROT SERPL-MCNC: 5.1 G/DL — LOW (ref 6.6–8.7)
PROT SERPL-MCNC: 5.2 G/DL — LOW (ref 6.6–8.7)
PROT SERPL-MCNC: 5.3 G/DL — LOW (ref 6.6–8.7)
PROT SERPL-MCNC: 5.3 G/DL — LOW (ref 6.6–8.7)
PROT SERPL-MCNC: 5.4 G/DL — LOW (ref 6.6–8.7)
PROT SERPL-MCNC: 5.4 G/DL — LOW (ref 6.6–8.7)
PROT SERPL-MCNC: 5.4 G/DL — LOW (ref 6–8.3)
PROT SERPL-MCNC: 5.4 G/DL — LOW (ref 6–8.3)
PROT SERPL-MCNC: 5.5 G/DL — LOW (ref 6.6–8.7)
PROT SERPL-MCNC: 5.6 G/DL — LOW (ref 6.6–8.7)
PROT SERPL-MCNC: 5.6 G/DL — LOW (ref 6.6–8.7)
PROT SERPL-MCNC: 5.8 G/DL — LOW (ref 6.6–8.7)
PROT SERPL-MCNC: 5.8 G/DL — LOW (ref 6.6–8.7)
PROT SERPL-MCNC: 5.9 G/DL — LOW (ref 6.6–8.7)
PROT SERPL-MCNC: 6.2 G/DL — LOW (ref 6.6–8.7)
PROT UR-MCNC: 15 MG/DL
PROT UR-MCNC: 30 MG/DL
PROT UR-MCNC: SIGNIFICANT CHANGE UP MG/DL
PROT/CREAT UR-RTO: 0.9 RATIO — HIGH
PROTHROM AB SERPL-ACNC: 23.6 SEC — HIGH (ref 10.5–13.4)
PROTHROM AB SERPL-ACNC: 24.1 SEC — HIGH (ref 10.5–13.4)
PROTHROM AB SERPL-ACNC: 26 SEC — HIGH (ref 10.5–13.4)
PROTHROM AB SERPL-ACNC: 26.4 SEC — HIGH (ref 10.5–13.4)
PROTHROM AB SERPL-ACNC: 26.7 SEC — HIGH (ref 10.5–13.4)
PROTHROM AB SERPL-ACNC: 26.9 SEC — HIGH (ref 10.5–13.4)
PROTHROM AB SERPL-ACNC: 27.3 SEC — HIGH (ref 10.5–13.4)
PROTHROM AB SERPL-ACNC: 27.6 SEC — HIGH (ref 10.5–13.4)
PROTHROM AB SERPL-ACNC: 28 SEC — HIGH (ref 10.5–13.4)
PROTHROM AB SERPL-ACNC: 29.6 SEC — HIGH (ref 10.5–13.4)
PROTHROM AB SERPL-ACNC: 30 SEC — HIGH (ref 10.5–13.4)
PROTHROM AB SERPL-ACNC: 30.2 SEC — HIGH (ref 10.5–13.4)
PROTHROM AB SERPL-ACNC: 30.2 SEC — HIGH (ref 10.5–13.4)
PROTHROM AB SERPL-ACNC: 31.3 SEC — HIGH (ref 10.5–13.4)
PROTHROM AB SERPL-ACNC: 31.9 SEC — HIGH (ref 10.5–13.4)
PROTHROM AB SERPL-ACNC: 33 SEC — HIGH (ref 10.5–13.4)
PROTHROM AB SERPL-ACNC: 33.1 SEC — HIGH (ref 10.5–13.4)
PROTHROM AB SERPL-ACNC: 33.2 SEC — HIGH (ref 10.5–13.4)
PROTHROM AB SERPL-ACNC: 34 SEC — HIGH (ref 10.5–13.4)
PROTHROM AB SERPL-ACNC: 34.7 SEC — HIGH (ref 10.5–13.4)
PROTHROM AB SERPL-ACNC: 36.3 SEC — HIGH (ref 10.5–13.4)
PROTHROM AB SERPL-ACNC: 36.9 SEC — HIGH (ref 10.5–13.4)
PROTHROM AB SERPL-ACNC: 39.5 SEC — HIGH (ref 10.5–13.4)
RAPID RVP RESULT: SIGNIFICANT CHANGE UP
RBC # BLD: 1.68 M/UL — LOW (ref 4.2–5.8)
RBC # BLD: 2.15 M/UL — LOW (ref 4.2–5.8)
RBC # BLD: 2.16 M/UL — LOW (ref 4.2–5.8)
RBC # BLD: 2.21 M/UL — LOW (ref 4.2–5.8)
RBC # BLD: 2.48 M/UL — LOW (ref 4.2–5.8)
RBC # BLD: 2.51 M/UL — LOW (ref 4.2–5.8)
RBC # BLD: 2.54 M/UL — LOW (ref 4.2–5.8)
RBC # BLD: 2.59 M/UL — LOW (ref 4.2–5.8)
RBC # BLD: 2.62 M/UL — LOW (ref 4.2–5.8)
RBC # BLD: 2.66 M/UL — LOW (ref 4.2–5.8)
RBC # BLD: 2.7 M/UL — LOW (ref 4.2–5.8)
RBC # BLD: 2.75 M/UL — LOW (ref 4.2–5.8)
RBC # BLD: 2.78 M/UL — LOW (ref 4.2–5.8)
RBC # BLD: 2.81 M/UL — LOW (ref 4.2–5.8)
RBC # BLD: 2.82 M/UL — LOW (ref 4.2–5.8)
RBC # BLD: 2.84 M/UL — LOW (ref 4.2–5.8)
RBC # BLD: 2.88 M/UL — LOW (ref 4.2–5.8)
RBC # BLD: 2.9 M/UL — LOW (ref 4.2–5.8)
RBC # BLD: 2.91 M/UL — LOW (ref 4.2–5.8)
RBC # BLD: 2.94 M/UL — LOW (ref 4.2–5.8)
RBC # BLD: 3.04 M/UL — LOW (ref 4.2–5.8)
RBC # BLD: 3.06 M/UL — LOW (ref 4.2–5.8)
RBC # BLD: 3.09 M/UL — LOW (ref 4.2–5.8)
RBC # BLD: 3.09 M/UL — LOW (ref 4.2–5.8)
RBC # BLD: 3.11 M/UL — LOW (ref 4.2–5.8)
RBC # BLD: 3.2 M/UL — LOW (ref 4.2–5.8)
RBC # FLD: 15.4 % — HIGH (ref 10.3–14.5)
RBC # FLD: 15.6 % — HIGH (ref 10.3–14.5)
RBC # FLD: 15.8 % — HIGH (ref 10.3–14.5)
RBC # FLD: 15.9 % — HIGH (ref 10.3–14.5)
RBC # FLD: 16 % — HIGH (ref 10.3–14.5)
RBC # FLD: 16.1 % — HIGH (ref 10.3–14.5)
RBC # FLD: 16.2 % — HIGH (ref 10.3–14.5)
RBC # FLD: 16.2 % — HIGH (ref 10.3–14.5)
RBC # FLD: 16.3 % — HIGH (ref 10.3–14.5)
RBC # FLD: 16.3 % — HIGH (ref 10.3–14.5)
RBC # FLD: 17.9 % — HIGH (ref 10.3–14.5)
RBC # FLD: 18.2 % — HIGH (ref 10.3–14.5)
RBC # FLD: 18.2 % — HIGH (ref 10.3–14.5)
RBC # FLD: 18.4 % — HIGH (ref 10.3–14.5)
RBC BLD AUTO: ABNORMAL
RBC CASTS # UR COMP ASSIST: ABNORMAL /HPF (ref 0–4)
RBC CASTS # UR COMP ASSIST: ABNORMAL /HPF (ref 0–4)
RBC CASTS # UR COMP ASSIST: NEGATIVE /HPF — SIGNIFICANT CHANGE UP (ref 0–4)
RCV VOL RI: <1000 /UL — HIGH (ref 0–0)
SARS-COV-2 RNA SPEC QL NAA+PROBE: SIGNIFICANT CHANGE UP
SCHISTOCYTES BLD QL AUTO: SLIGHT — SIGNIFICANT CHANGE UP
SODIUM SERPL-SCNC: 121 MMOL/L — LOW (ref 135–145)
SODIUM SERPL-SCNC: 122 MMOL/L — LOW (ref 135–145)
SODIUM SERPL-SCNC: 123 MMOL/L — LOW (ref 135–145)
SODIUM SERPL-SCNC: 124 MMOL/L — LOW (ref 135–145)
SODIUM SERPL-SCNC: 126 MMOL/L — LOW (ref 135–145)
SODIUM SERPL-SCNC: 127 MMOL/L — LOW (ref 135–145)
SODIUM SERPL-SCNC: 128 MMOL/L — LOW (ref 135–145)
SODIUM SERPL-SCNC: 129 MMOL/L — LOW (ref 135–145)
SODIUM SERPL-SCNC: 130 MMOL/L — LOW (ref 135–145)
SODIUM SERPL-SCNC: 134 MMOL/L — LOW (ref 135–145)
SODIUM SERPL-SCNC: 136 MMOL/L — SIGNIFICANT CHANGE UP (ref 135–145)
SODIUM SERPL-SCNC: 138 MMOL/L — SIGNIFICANT CHANGE UP (ref 135–145)
SODIUM UR-SCNC: <30 MMOL/L — SIGNIFICANT CHANGE UP
SODIUM UR-SCNC: <30 MMOL/L — SIGNIFICANT CHANGE UP
SP GR SPEC: 1.01 — SIGNIFICANT CHANGE UP (ref 1.01–1.02)
SP GR SPEC: 1.01 — SIGNIFICANT CHANGE UP (ref 1.01–1.02)
SP GR SPEC: 1.02 — SIGNIFICANT CHANGE UP (ref 1.01–1.02)
SPECIMEN SOURCE: SIGNIFICANT CHANGE UP
TARGETS BLD QL SMEAR: SLIGHT — SIGNIFICANT CHANGE UP
TARGETS BLD QL SMEAR: SLIGHT — SIGNIFICANT CHANGE UP
TIBC SERPL-MCNC: 129 UG/DL — LOW (ref 220–430)
TOTAL NUCLEATED CELL COUNT, BODY FLUID: 179 /UL — SIGNIFICANT CHANGE UP
TRIGL SERPL-MCNC: 102 MG/DL — SIGNIFICANT CHANGE UP
TROPONIN T SERPL-MCNC: <0.01 NG/ML — SIGNIFICANT CHANGE UP (ref 0–0.06)
TSH SERPL-MCNC: 2.93 UIU/ML — SIGNIFICANT CHANGE UP (ref 0.27–4.2)
TUBE TYPE: SIGNIFICANT CHANGE UP
UROBILINOGEN FLD QL: 4
UROBILINOGEN FLD QL: 8
UROBILINOGEN FLD QL: NEGATIVE MG/DL — SIGNIFICANT CHANGE UP
VIT B12 SERPL-MCNC: >2000 PG/ML — HIGH (ref 232–1245)
WBC # BLD: 12.06 K/UL — HIGH (ref 3.8–10.5)
WBC # BLD: 13.2 K/UL — HIGH (ref 3.8–10.5)
WBC # BLD: 13.44 K/UL — HIGH (ref 3.8–10.5)
WBC # BLD: 13.62 K/UL — HIGH (ref 3.8–10.5)
WBC # BLD: 13.8 K/UL — HIGH (ref 3.8–10.5)
WBC # BLD: 14.84 K/UL — HIGH (ref 3.8–10.5)
WBC # BLD: 14.86 K/UL — HIGH (ref 3.8–10.5)
WBC # BLD: 15.12 K/UL — HIGH (ref 3.8–10.5)
WBC # BLD: 15.17 K/UL — HIGH (ref 3.8–10.5)
WBC # BLD: 15.25 K/UL — HIGH (ref 3.8–10.5)
WBC # BLD: 15.32 K/UL — HIGH (ref 3.8–10.5)
WBC # BLD: 15.62 K/UL — HIGH (ref 3.8–10.5)
WBC # BLD: 15.95 K/UL — HIGH (ref 3.8–10.5)
WBC # BLD: 16.14 K/UL — HIGH (ref 3.8–10.5)
WBC # BLD: 16.16 K/UL — HIGH (ref 3.8–10.5)
WBC # BLD: 16.37 K/UL — HIGH (ref 3.8–10.5)
WBC # BLD: 16.45 K/UL — HIGH (ref 3.8–10.5)
WBC # BLD: 16.47 K/UL — HIGH (ref 3.8–10.5)
WBC # BLD: 16.71 K/UL — HIGH (ref 3.8–10.5)
WBC # BLD: 16.72 K/UL — HIGH (ref 3.8–10.5)
WBC # BLD: 16.8 K/UL — HIGH (ref 3.8–10.5)
WBC # BLD: 16.9 K/UL — HIGH (ref 3.8–10.5)
WBC # BLD: 17.22 K/UL — HIGH (ref 3.8–10.5)
WBC # BLD: 17.44 K/UL — HIGH (ref 3.8–10.5)
WBC # BLD: 18.55 K/UL — HIGH (ref 3.8–10.5)
WBC # BLD: 18.7 K/UL — HIGH (ref 3.8–10.5)
WBC # BLD: 18.8 K/UL — HIGH (ref 3.8–10.5)
WBC # BLD: 19.46 K/UL — HIGH (ref 3.8–10.5)
WBC # FLD AUTO: 12.06 K/UL — HIGH (ref 3.8–10.5)
WBC # FLD AUTO: 13.2 K/UL — HIGH (ref 3.8–10.5)
WBC # FLD AUTO: 13.44 K/UL — HIGH (ref 3.8–10.5)
WBC # FLD AUTO: 13.62 K/UL — HIGH (ref 3.8–10.5)
WBC # FLD AUTO: 13.8 K/UL — HIGH (ref 3.8–10.5)
WBC # FLD AUTO: 14.84 K/UL — HIGH (ref 3.8–10.5)
WBC # FLD AUTO: 14.86 K/UL — HIGH (ref 3.8–10.5)
WBC # FLD AUTO: 15.12 K/UL — HIGH (ref 3.8–10.5)
WBC # FLD AUTO: 15.17 K/UL — HIGH (ref 3.8–10.5)
WBC # FLD AUTO: 15.25 K/UL — HIGH (ref 3.8–10.5)
WBC # FLD AUTO: 15.32 K/UL — HIGH (ref 3.8–10.5)
WBC # FLD AUTO: 15.62 K/UL — HIGH (ref 3.8–10.5)
WBC # FLD AUTO: 15.95 K/UL — HIGH (ref 3.8–10.5)
WBC # FLD AUTO: 16.14 K/UL — HIGH (ref 3.8–10.5)
WBC # FLD AUTO: 16.16 K/UL — HIGH (ref 3.8–10.5)
WBC # FLD AUTO: 16.37 K/UL — HIGH (ref 3.8–10.5)
WBC # FLD AUTO: 16.45 K/UL — HIGH (ref 3.8–10.5)
WBC # FLD AUTO: 16.47 K/UL — HIGH (ref 3.8–10.5)
WBC # FLD AUTO: 16.71 K/UL — HIGH (ref 3.8–10.5)
WBC # FLD AUTO: 16.72 K/UL — HIGH (ref 3.8–10.5)
WBC # FLD AUTO: 16.8 K/UL — HIGH (ref 3.8–10.5)
WBC # FLD AUTO: 16.9 K/UL — HIGH (ref 3.8–10.5)
WBC # FLD AUTO: 17.22 K/UL — HIGH (ref 3.8–10.5)
WBC # FLD AUTO: 17.44 K/UL — HIGH (ref 3.8–10.5)
WBC # FLD AUTO: 18.55 K/UL — HIGH (ref 3.8–10.5)
WBC # FLD AUTO: 18.7 K/UL — HIGH (ref 3.8–10.5)
WBC # FLD AUTO: 18.8 K/UL — HIGH (ref 3.8–10.5)
WBC # FLD AUTO: 19.46 K/UL — HIGH (ref 3.8–10.5)
WBC UR QL: NEGATIVE /HPF — SIGNIFICANT CHANGE UP (ref 0–5)
WBC UR QL: SIGNIFICANT CHANGE UP /HPF (ref 0–5)
WBC UR QL: SIGNIFICANT CHANGE UP /HPF (ref 0–5)

## 2022-01-01 PROCEDURE — 87070 CULTURE OTHR SPECIMN AEROBIC: CPT

## 2022-01-01 PROCEDURE — 76705 ECHO EXAM OF ABDOMEN: CPT | Mod: 26

## 2022-01-01 PROCEDURE — 86985 SPLIT BLOOD OR PRODUCTS: CPT

## 2022-01-01 PROCEDURE — 99233 SBSQ HOSP IP/OBS HIGH 50: CPT

## 2022-01-01 PROCEDURE — 76705 ECHO EXAM OF ABDOMEN: CPT

## 2022-01-01 PROCEDURE — 99232 SBSQ HOSP IP/OBS MODERATE 35: CPT

## 2022-01-01 PROCEDURE — 84155 ASSAY OF PROTEIN SERUM: CPT

## 2022-01-01 PROCEDURE — 99497 ADVNCD CARE PLAN 30 MIN: CPT | Mod: 25

## 2022-01-01 PROCEDURE — 83615 LACTATE (LD) (LDH) ENZYME: CPT

## 2022-01-01 PROCEDURE — U0005: CPT

## 2022-01-01 PROCEDURE — P9016: CPT

## 2022-01-01 PROCEDURE — 82746 ASSAY OF FOLIC ACID SERUM: CPT

## 2022-01-01 PROCEDURE — 85025 COMPLETE CBC W/AUTO DIFF WBC: CPT

## 2022-01-01 PROCEDURE — 85730 THROMBOPLASTIN TIME PARTIAL: CPT

## 2022-01-01 PROCEDURE — 83550 IRON BINDING TEST: CPT

## 2022-01-01 PROCEDURE — 82607 VITAMIN B-12: CPT

## 2022-01-01 PROCEDURE — 74183 MRI ABD W/O CNTR FLWD CNTR: CPT

## 2022-01-01 PROCEDURE — 86334 IMMUNOFIX E-PHORESIS SERUM: CPT

## 2022-01-01 PROCEDURE — 84484 ASSAY OF TROPONIN QUANT: CPT

## 2022-01-01 PROCEDURE — 86923 COMPATIBILITY TEST ELECTRIC: CPT

## 2022-01-01 PROCEDURE — P9011: CPT

## 2022-01-01 PROCEDURE — 93005 ELECTROCARDIOGRAM TRACING: CPT

## 2022-01-01 PROCEDURE — 86850 RBC ANTIBODY SCREEN: CPT

## 2022-01-01 PROCEDURE — 86901 BLOOD TYPING SEROLOGIC RH(D): CPT

## 2022-01-01 PROCEDURE — 84165 PROTEIN E-PHORESIS SERUM: CPT

## 2022-01-01 PROCEDURE — 99223 1ST HOSP IP/OBS HIGH 75: CPT

## 2022-01-01 PROCEDURE — 89051 BODY FLUID CELL COUNT: CPT

## 2022-01-01 PROCEDURE — 87075 CULTR BACTERIA EXCEPT BLOOD: CPT

## 2022-01-01 PROCEDURE — 82043 UR ALBUMIN QUANTITATIVE: CPT

## 2022-01-01 PROCEDURE — 77002 NEEDLE LOCALIZATION BY XRAY: CPT

## 2022-01-01 PROCEDURE — 83540 ASSAY OF IRON: CPT

## 2022-01-01 PROCEDURE — 36415 COLL VENOUS BLD VENIPUNCTURE: CPT

## 2022-01-01 PROCEDURE — 99497 ADVNCD CARE PLAN 30 MIN: CPT

## 2022-01-01 PROCEDURE — 87040 BLOOD CULTURE FOR BACTERIA: CPT

## 2022-01-01 PROCEDURE — 84157 ASSAY OF PROTEIN OTHER: CPT

## 2022-01-01 PROCEDURE — 74177 CT ABD & PELVIS W/CONTRAST: CPT | Mod: 26

## 2022-01-01 PROCEDURE — 76775 US EXAM ABDO BACK WALL LIM: CPT | Mod: 26

## 2022-01-01 PROCEDURE — 84300 ASSAY OF URINE SODIUM: CPT

## 2022-01-01 PROCEDURE — 76775 US EXAM ABDO BACK WALL LIM: CPT

## 2022-01-01 PROCEDURE — 80048 BASIC METABOLIC PNL TOTAL CA: CPT

## 2022-01-01 PROCEDURE — 82945 GLUCOSE OTHER FLUID: CPT

## 2022-01-01 PROCEDURE — 84100 ASSAY OF PHOSPHORUS: CPT

## 2022-01-01 PROCEDURE — 0225U NFCT DS DNA&RNA 21 SARSCOV2: CPT

## 2022-01-01 PROCEDURE — 85610 PROTHROMBIN TIME: CPT

## 2022-01-01 PROCEDURE — 87102 FUNGUS ISOLATION CULTURE: CPT

## 2022-01-01 PROCEDURE — 49418 INSERT TUN IP CATH PERC: CPT

## 2022-01-01 PROCEDURE — 82570 ASSAY OF URINE CREATININE: CPT

## 2022-01-01 PROCEDURE — 84156 ASSAY OF PROTEIN URINE: CPT

## 2022-01-01 PROCEDURE — 99285 EMERGENCY DEPT VISIT HI MDM: CPT

## 2022-01-01 PROCEDURE — 36430 TRANSFUSION BLD/BLD COMPNT: CPT

## 2022-01-01 PROCEDURE — 82533 TOTAL CORTISOL: CPT

## 2022-01-01 PROCEDURE — 93010 ELECTROCARDIOGRAM REPORT: CPT

## 2022-01-01 PROCEDURE — 83521 IG LIGHT CHAINS FREE EACH: CPT

## 2022-01-01 PROCEDURE — 93970 EXTREMITY STUDY: CPT

## 2022-01-01 PROCEDURE — 82105 ALPHA-FETOPROTEIN SERUM: CPT

## 2022-01-01 PROCEDURE — 87205 SMEAR GRAM STAIN: CPT

## 2022-01-01 PROCEDURE — 74177 CT ABD & PELVIS W/CONTRAST: CPT

## 2022-01-01 PROCEDURE — P9012: CPT

## 2022-01-01 PROCEDURE — 80074 ACUTE HEPATITIS PANEL: CPT

## 2022-01-01 PROCEDURE — 83735 ASSAY OF MAGNESIUM: CPT

## 2022-01-01 PROCEDURE — 74183 MRI ABD W/O CNTR FLWD CNTR: CPT | Mod: 26

## 2022-01-01 PROCEDURE — 99239 HOSP IP/OBS DSCHRG MGMT >30: CPT

## 2022-01-01 PROCEDURE — P9047: CPT

## 2022-01-01 PROCEDURE — 80061 LIPID PANEL: CPT

## 2022-01-01 PROCEDURE — 71045 X-RAY EXAM CHEST 1 VIEW: CPT | Mod: 26

## 2022-01-01 PROCEDURE — 83690 ASSAY OF LIPASE: CPT

## 2022-01-01 PROCEDURE — 83930 ASSAY OF BLOOD OSMOLALITY: CPT

## 2022-01-01 PROCEDURE — P9040: CPT

## 2022-01-01 PROCEDURE — P9059: CPT

## 2022-01-01 PROCEDURE — 49083 ABD PARACENTESIS W/IMAGING: CPT

## 2022-01-01 PROCEDURE — 82140 ASSAY OF AMMONIA: CPT

## 2022-01-01 PROCEDURE — 84443 ASSAY THYROID STIM HORMONE: CPT

## 2022-01-01 PROCEDURE — 80053 COMPREHEN METABOLIC PANEL: CPT

## 2022-01-01 PROCEDURE — U0003: CPT

## 2022-01-01 PROCEDURE — C1769: CPT

## 2022-01-01 PROCEDURE — P9017: CPT

## 2022-01-01 PROCEDURE — 86900 BLOOD TYPING SEROLOGIC ABO: CPT

## 2022-01-01 PROCEDURE — 88112 CYTOPATH CELL ENHANCE TECH: CPT

## 2022-01-01 PROCEDURE — 82728 ASSAY OF FERRITIN: CPT

## 2022-01-01 PROCEDURE — 81001 URINALYSIS AUTO W/SCOPE: CPT

## 2022-01-01 PROCEDURE — 84466 ASSAY OF TRANSFERRIN: CPT

## 2022-01-01 PROCEDURE — C1729: CPT

## 2022-01-01 PROCEDURE — 82042 OTHER SOURCE ALBUMIN QUAN EA: CPT

## 2022-01-01 PROCEDURE — 93970 EXTREMITY STUDY: CPT | Mod: 26

## 2022-01-01 PROCEDURE — 88112 CYTOPATH CELL ENHANCE TECH: CPT | Mod: 26

## 2022-01-01 PROCEDURE — 83935 ASSAY OF URINE OSMOLALITY: CPT

## 2022-01-01 PROCEDURE — 82248 BILIRUBIN DIRECT: CPT

## 2022-01-01 PROCEDURE — 99255 IP/OBS CONSLTJ NEW/EST HI 80: CPT

## 2022-01-01 PROCEDURE — 85384 FIBRINOGEN ACTIVITY: CPT

## 2022-01-01 PROCEDURE — 71045 X-RAY EXAM CHEST 1 VIEW: CPT

## 2022-01-01 PROCEDURE — 76942 ECHO GUIDE FOR BIOPSY: CPT

## 2022-01-01 PROCEDURE — 85027 COMPLETE CBC AUTOMATED: CPT

## 2022-01-01 RX ORDER — POTASSIUM CHLORIDE 20 MEQ
1 PACKET (EA) ORAL
Qty: 0 | Refills: 0 | DISCHARGE

## 2022-01-01 RX ORDER — SIMETHICONE 80 MG/1
80 TABLET, CHEWABLE ORAL DAILY
Refills: 0 | Status: DISCONTINUED | OUTPATIENT
Start: 2022-01-01 | End: 2022-01-01

## 2022-01-01 RX ORDER — MIDODRINE HYDROCHLORIDE 2.5 MG/1
15 TABLET ORAL THREE TIMES A DAY
Refills: 0 | Status: DISCONTINUED | OUTPATIENT
Start: 2022-01-01 | End: 2022-01-01

## 2022-01-01 RX ORDER — SPIRONOLACTONE 25 MG/1
100 TABLET, FILM COATED ORAL DAILY
Refills: 0 | Status: DISCONTINUED | OUTPATIENT
Start: 2022-01-01 | End: 2022-01-01

## 2022-01-01 RX ORDER — POTASSIUM CHLORIDE 20 MEQ
10 PACKET (EA) ORAL
Refills: 0 | Status: COMPLETED | OUTPATIENT
Start: 2022-01-01 | End: 2022-01-01

## 2022-01-01 RX ORDER — FOLIC ACID 0.8 MG
1 TABLET ORAL DAILY
Refills: 0 | Status: DISCONTINUED | OUTPATIENT
Start: 2022-01-01 | End: 2022-01-01

## 2022-01-01 RX ORDER — PHYTONADIONE (VIT K1) 5 MG
10 TABLET ORAL DAILY
Refills: 0 | Status: COMPLETED | OUTPATIENT
Start: 2022-01-01 | End: 2022-01-01

## 2022-01-01 RX ORDER — OCTREOTIDE ACETATE 200 UG/ML
50 INJECTION, SOLUTION INTRAVENOUS; SUBCUTANEOUS
Qty: 500 | Refills: 0 | Status: DISCONTINUED | OUTPATIENT
Start: 2022-01-01 | End: 2022-01-01

## 2022-01-01 RX ORDER — LANOLIN ALCOHOL/MO/W.PET/CERES
3 CREAM (GRAM) TOPICAL AT BEDTIME
Refills: 0 | Status: DISCONTINUED | OUTPATIENT
Start: 2022-01-01 | End: 2022-01-01

## 2022-01-01 RX ORDER — SODIUM CHLORIDE 9 MG/ML
1000 INJECTION, SOLUTION INTRAVENOUS
Refills: 0 | Status: DISCONTINUED | OUTPATIENT
Start: 2022-01-01 | End: 2022-01-01

## 2022-01-01 RX ORDER — ACETAMINOPHEN 500 MG
650 TABLET ORAL ONCE
Refills: 0 | Status: COMPLETED | OUTPATIENT
Start: 2022-01-01 | End: 2022-01-01

## 2022-01-01 RX ORDER — IBUPROFEN 200 MG
400 TABLET ORAL EVERY 12 HOURS
Refills: 0 | Status: DISCONTINUED | OUTPATIENT
Start: 2022-01-01 | End: 2022-01-01

## 2022-01-01 RX ORDER — PREDNISOLONE 5 MG
40 TABLET ORAL DAILY
Refills: 0 | Status: DISCONTINUED | OUTPATIENT
Start: 2022-01-01 | End: 2022-01-01

## 2022-01-01 RX ORDER — ALBUMIN HUMAN 25 %
50 VIAL (ML) INTRAVENOUS ONCE
Refills: 0 | Status: COMPLETED | OUTPATIENT
Start: 2022-01-01 | End: 2022-01-01

## 2022-01-01 RX ORDER — MORPHINE SULFATE 50 MG/1
1 CAPSULE, EXTENDED RELEASE ORAL ONCE
Refills: 0 | Status: DISCONTINUED | OUTPATIENT
Start: 2022-01-01 | End: 2022-01-01

## 2022-01-01 RX ORDER — SODIUM CHLORIDE 9 MG/ML
1 INJECTION INTRAMUSCULAR; INTRAVENOUS; SUBCUTANEOUS
Refills: 0 | Status: DISCONTINUED | OUTPATIENT
Start: 2022-01-01 | End: 2022-01-01

## 2022-01-01 RX ORDER — FOLIC ACID 0.8 MG
1 TABLET ORAL
Qty: 0 | Refills: 0 | DISCHARGE

## 2022-01-01 RX ORDER — SALIVA SUBSTITUTE COMB NO.11 351 MG
5 POWDER IN PACKET (EA) MUCOUS MEMBRANE EVERY 4 HOURS
Refills: 0 | Status: DISCONTINUED | OUTPATIENT
Start: 2022-01-01 | End: 2022-01-01

## 2022-01-01 RX ORDER — MIDODRINE HYDROCHLORIDE 2.5 MG/1
7.5 TABLET ORAL THREE TIMES A DAY
Refills: 0 | Status: DISCONTINUED | OUTPATIENT
Start: 2022-01-01 | End: 2022-01-01

## 2022-01-01 RX ORDER — POTASSIUM CHLORIDE 20 MEQ
40 PACKET (EA) ORAL ONCE
Refills: 0 | Status: COMPLETED | OUTPATIENT
Start: 2022-01-01 | End: 2022-01-01

## 2022-01-01 RX ORDER — ALBUMIN HUMAN 25 %
50 VIAL (ML) INTRAVENOUS ONCE
Refills: 0 | Status: DISCONTINUED | OUTPATIENT
Start: 2022-01-01 | End: 2022-01-01

## 2022-01-01 RX ORDER — LACTULOSE 10 G/15ML
200 SOLUTION ORAL EVERY 12 HOURS
Refills: 0 | Status: DISCONTINUED | OUTPATIENT
Start: 2022-01-01 | End: 2022-01-01

## 2022-01-01 RX ORDER — PHYTONADIONE (VIT K1) 5 MG
5 TABLET ORAL EVERY 24 HOURS
Refills: 0 | Status: COMPLETED | OUTPATIENT
Start: 2022-01-01 | End: 2022-01-01

## 2022-01-01 RX ORDER — THIAMINE MONONITRATE (VIT B1) 100 MG
100 TABLET ORAL DAILY
Refills: 0 | Status: DISCONTINUED | OUTPATIENT
Start: 2022-01-01 | End: 2022-01-01

## 2022-01-01 RX ORDER — HEPARIN SODIUM 5000 [USP'U]/ML
5000 INJECTION INTRAVENOUS; SUBCUTANEOUS EVERY 8 HOURS
Refills: 0 | Status: DISCONTINUED | OUTPATIENT
Start: 2022-01-01 | End: 2022-01-01

## 2022-01-01 RX ORDER — ACETAMINOPHEN 500 MG
650 TABLET ORAL EVERY 6 HOURS
Refills: 0 | Status: DISCONTINUED | OUTPATIENT
Start: 2022-01-01 | End: 2022-01-01

## 2022-01-01 RX ORDER — FUROSEMIDE 40 MG
40 TABLET ORAL DAILY
Refills: 0 | Status: DISCONTINUED | OUTPATIENT
Start: 2022-01-01 | End: 2022-01-01

## 2022-01-01 RX ORDER — OCTREOTIDE ACETATE 200 UG/ML
250 INJECTION, SOLUTION INTRAVENOUS; SUBCUTANEOUS
Qty: 500 | Refills: 0 | Status: DISCONTINUED | OUTPATIENT
Start: 2022-01-01 | End: 2022-01-01

## 2022-01-01 RX ORDER — LACTULOSE 10 G/15ML
10 SOLUTION ORAL
Refills: 0 | Status: DISCONTINUED | OUTPATIENT
Start: 2022-01-01 | End: 2022-01-01

## 2022-01-01 RX ORDER — PANTOPRAZOLE SODIUM 20 MG/1
40 TABLET, DELAYED RELEASE ORAL
Refills: 0 | Status: DISCONTINUED | OUTPATIENT
Start: 2022-01-01 | End: 2022-01-01

## 2022-01-01 RX ORDER — PHYTONADIONE (VIT K1) 5 MG
5 TABLET ORAL ONCE
Refills: 0 | Status: COMPLETED | OUTPATIENT
Start: 2022-01-01 | End: 2022-01-01

## 2022-01-01 RX ORDER — MIDODRINE HYDROCHLORIDE 2.5 MG/1
5 TABLET ORAL THREE TIMES A DAY
Refills: 0 | Status: DISCONTINUED | OUTPATIENT
Start: 2022-01-01 | End: 2022-01-01

## 2022-01-01 RX ORDER — POTASSIUM CHLORIDE 20 MEQ
20 PACKET (EA) ORAL DAILY
Refills: 0 | Status: DISCONTINUED | OUTPATIENT
Start: 2022-01-01 | End: 2022-01-01

## 2022-01-01 RX ORDER — MAGNESIUM SULFATE 500 MG/ML
2 VIAL (ML) INJECTION ONCE
Refills: 0 | Status: COMPLETED | OUTPATIENT
Start: 2022-01-01 | End: 2022-01-01

## 2022-01-01 RX ORDER — PHYTONADIONE (VIT K1) 5 MG
5 TABLET ORAL DAILY
Refills: 0 | Status: COMPLETED | OUTPATIENT
Start: 2022-01-01 | End: 2022-01-01

## 2022-01-01 RX ORDER — MORPHINE SULFATE 50 MG/1
2 CAPSULE, EXTENDED RELEASE ORAL EVERY 4 HOURS
Refills: 0 | Status: DISCONTINUED | OUTPATIENT
Start: 2022-01-01 | End: 2022-01-01

## 2022-01-01 RX ORDER — SPIRONOLACTONE 25 MG/1
25 TABLET, FILM COATED ORAL DAILY
Refills: 0 | Status: DISCONTINUED | OUTPATIENT
Start: 2022-01-01 | End: 2022-01-01

## 2022-01-01 RX ORDER — ALBUMIN HUMAN 25 %
100 VIAL (ML) INTRAVENOUS EVERY 12 HOURS
Refills: 0 | Status: COMPLETED | OUTPATIENT
Start: 2022-01-01 | End: 2022-01-01

## 2022-01-01 RX ORDER — OCTREOTIDE ACETATE 200 UG/ML
100 INJECTION, SOLUTION INTRAVENOUS; SUBCUTANEOUS THREE TIMES A DAY
Refills: 0 | Status: DISCONTINUED | OUTPATIENT
Start: 2022-01-01 | End: 2022-01-01

## 2022-01-01 RX ORDER — IBUPROFEN 200 MG
400 TABLET ORAL ONCE
Refills: 0 | Status: COMPLETED | OUTPATIENT
Start: 2022-01-01 | End: 2022-01-01

## 2022-01-01 RX ORDER — LACTULOSE 10 G/15ML
15 SOLUTION ORAL
Qty: 0 | Refills: 0 | DISCHARGE

## 2022-01-01 RX ORDER — MIDODRINE HYDROCHLORIDE 2.5 MG/1
10 TABLET ORAL THREE TIMES A DAY
Refills: 0 | Status: DISCONTINUED | OUTPATIENT
Start: 2022-01-01 | End: 2022-01-01

## 2022-01-01 RX ORDER — ALBUMIN HUMAN 25 %
100 VIAL (ML) INTRAVENOUS ONCE
Refills: 0 | Status: COMPLETED | OUTPATIENT
Start: 2022-01-01 | End: 2022-01-01

## 2022-01-01 RX ORDER — ONDANSETRON 8 MG/1
4 TABLET, FILM COATED ORAL EVERY 8 HOURS
Refills: 0 | Status: DISCONTINUED | OUTPATIENT
Start: 2022-01-01 | End: 2022-01-01

## 2022-01-01 RX ORDER — MORPHINE SULFATE 50 MG/1
4 CAPSULE, EXTENDED RELEASE ORAL EVERY 4 HOURS
Refills: 0 | Status: DISCONTINUED | OUTPATIENT
Start: 2022-01-01 | End: 2022-01-01

## 2022-01-01 RX ORDER — LIDOCAINE 4 G/100G
1 CREAM TOPICAL EVERY 24 HOURS
Refills: 0 | Status: DISCONTINUED | OUTPATIENT
Start: 2022-01-01 | End: 2022-01-01

## 2022-01-01 RX ORDER — CHOLESTYRAMINE 4 G/9G
4 POWDER, FOR SUSPENSION ORAL
Refills: 0 | Status: DISCONTINUED | OUTPATIENT
Start: 2022-01-01 | End: 2022-01-01

## 2022-01-01 RX ORDER — CEFTRIAXONE 500 MG/1
1000 INJECTION, POWDER, FOR SOLUTION INTRAMUSCULAR; INTRAVENOUS EVERY 24 HOURS
Refills: 0 | Status: COMPLETED | OUTPATIENT
Start: 2022-01-01 | End: 2022-01-01

## 2022-01-01 RX ORDER — CIPROFLOXACIN LACTATE 400MG/40ML
500 VIAL (ML) INTRAVENOUS DAILY
Refills: 0 | Status: DISCONTINUED | OUTPATIENT
Start: 2022-01-01 | End: 2022-01-01

## 2022-01-01 RX ORDER — LANOLIN ALCOHOL/MO/W.PET/CERES
2 CREAM (GRAM) TOPICAL
Qty: 0 | Refills: 0 | DISCHARGE

## 2022-01-01 RX ORDER — SODIUM CHLORIDE 9 MG/ML
1000 INJECTION INTRAMUSCULAR; INTRAVENOUS; SUBCUTANEOUS ONCE
Refills: 0 | Status: COMPLETED | OUTPATIENT
Start: 2022-01-01 | End: 2022-01-01

## 2022-01-01 RX ORDER — SODIUM CHLORIDE 9 MG/ML
500 INJECTION INTRAMUSCULAR; INTRAVENOUS; SUBCUTANEOUS ONCE
Refills: 0 | Status: COMPLETED | OUTPATIENT
Start: 2022-01-01 | End: 2022-01-01

## 2022-01-01 RX ADMIN — SIMETHICONE 80 MILLIGRAM(S): 80 TABLET, CHEWABLE ORAL at 13:50

## 2022-01-01 RX ADMIN — Medication 100 MILLIGRAM(S): at 12:23

## 2022-01-01 RX ADMIN — Medication 102 MILLIGRAM(S): at 12:30

## 2022-01-01 RX ADMIN — MORPHINE SULFATE 1 MILLIGRAM(S): 50 CAPSULE, EXTENDED RELEASE ORAL at 04:59

## 2022-01-01 RX ADMIN — PANTOPRAZOLE SODIUM 40 MILLIGRAM(S): 20 TABLET, DELAYED RELEASE ORAL at 09:19

## 2022-01-01 RX ADMIN — MIDODRINE HYDROCHLORIDE 10 MILLIGRAM(S): 2.5 TABLET ORAL at 12:05

## 2022-01-01 RX ADMIN — Medication 100 MILLIGRAM(S): at 11:39

## 2022-01-01 RX ADMIN — HEPARIN SODIUM 5000 UNIT(S): 5000 INJECTION INTRAVENOUS; SUBCUTANEOUS at 05:35

## 2022-01-01 RX ADMIN — SIMETHICONE 80 MILLIGRAM(S): 80 TABLET, CHEWABLE ORAL at 13:25

## 2022-01-01 RX ADMIN — LACTULOSE 10 GRAM(S): 10 SOLUTION ORAL at 05:12

## 2022-01-01 RX ADMIN — LIDOCAINE 1 PATCH: 4 CREAM TOPICAL at 13:25

## 2022-01-01 RX ADMIN — SIMETHICONE 80 MILLIGRAM(S): 80 TABLET, CHEWABLE ORAL at 13:43

## 2022-01-01 RX ADMIN — Medication 40 MILLIGRAM(S): at 05:24

## 2022-01-01 RX ADMIN — PANTOPRAZOLE SODIUM 40 MILLIGRAM(S): 20 TABLET, DELAYED RELEASE ORAL at 05:13

## 2022-01-01 RX ADMIN — PANTOPRAZOLE SODIUM 40 MILLIGRAM(S): 20 TABLET, DELAYED RELEASE ORAL at 06:51

## 2022-01-01 RX ADMIN — HEPARIN SODIUM 5000 UNIT(S): 5000 INJECTION INTRAVENOUS; SUBCUTANEOUS at 12:15

## 2022-01-01 RX ADMIN — Medication 500 MILLIGRAM(S): at 13:23

## 2022-01-01 RX ADMIN — LIDOCAINE 1 PATCH: 4 CREAM TOPICAL at 13:33

## 2022-01-01 RX ADMIN — Medication 500 MILLIGRAM(S): at 11:38

## 2022-01-01 RX ADMIN — LIDOCAINE 1 PATCH: 4 CREAM TOPICAL at 01:00

## 2022-01-01 RX ADMIN — Medication 100 MILLIGRAM(S): at 12:04

## 2022-01-01 RX ADMIN — Medication 1 MILLIGRAM(S): at 12:03

## 2022-01-01 RX ADMIN — SODIUM CHLORIDE 50 MILLILITER(S): 9 INJECTION, SOLUTION INTRAVENOUS at 13:52

## 2022-01-01 RX ADMIN — OCTREOTIDE ACETATE 10 MICROGRAM(S)/HR: 200 INJECTION, SOLUTION INTRAVENOUS; SUBCUTANEOUS at 17:59

## 2022-01-01 RX ADMIN — SODIUM CHLORIDE 50 MILLILITER(S): 9 INJECTION, SOLUTION INTRAVENOUS at 18:06

## 2022-01-01 RX ADMIN — LACTULOSE 10 GRAM(S): 10 SOLUTION ORAL at 05:17

## 2022-01-01 RX ADMIN — Medication 100 MILLIEQUIVALENT(S): at 15:16

## 2022-01-01 RX ADMIN — Medication 1 MILLIGRAM(S): at 12:05

## 2022-01-01 RX ADMIN — Medication 5 MILLILITER(S): at 10:48

## 2022-01-01 RX ADMIN — MIDODRINE HYDROCHLORIDE 5 MILLIGRAM(S): 2.5 TABLET ORAL at 16:32

## 2022-01-01 RX ADMIN — SIMETHICONE 80 MILLIGRAM(S): 80 TABLET, CHEWABLE ORAL at 11:11

## 2022-01-01 RX ADMIN — Medication 5 MILLILITER(S): at 05:14

## 2022-01-01 RX ADMIN — Medication 40 MILLIGRAM(S): at 05:37

## 2022-01-01 RX ADMIN — LACTULOSE 10 GRAM(S): 10 SOLUTION ORAL at 00:11

## 2022-01-01 RX ADMIN — LACTULOSE 10 GRAM(S): 10 SOLUTION ORAL at 05:14

## 2022-01-01 RX ADMIN — Medication 5 MILLILITER(S): at 04:34

## 2022-01-01 RX ADMIN — LACTULOSE 10 GRAM(S): 10 SOLUTION ORAL at 12:53

## 2022-01-01 RX ADMIN — MIDODRINE HYDROCHLORIDE 10 MILLIGRAM(S): 2.5 TABLET ORAL at 12:44

## 2022-01-01 RX ADMIN — Medication 5 MILLILITER(S): at 05:40

## 2022-01-01 RX ADMIN — Medication 400 MILLIGRAM(S): at 19:05

## 2022-01-01 RX ADMIN — MIDODRINE HYDROCHLORIDE 7.5 MILLIGRAM(S): 2.5 TABLET ORAL at 17:47

## 2022-01-01 RX ADMIN — LIDOCAINE 1 PATCH: 4 CREAM TOPICAL at 00:29

## 2022-01-01 RX ADMIN — SODIUM CHLORIDE 50 MILLILITER(S): 9 INJECTION, SOLUTION INTRAVENOUS at 17:59

## 2022-01-01 RX ADMIN — LIDOCAINE 1 PATCH: 4 CREAM TOPICAL at 19:00

## 2022-01-01 RX ADMIN — LIDOCAINE 1 PATCH: 4 CREAM TOPICAL at 13:43

## 2022-01-01 RX ADMIN — PANTOPRAZOLE SODIUM 40 MILLIGRAM(S): 20 TABLET, DELAYED RELEASE ORAL at 05:12

## 2022-01-01 RX ADMIN — PANTOPRAZOLE SODIUM 40 MILLIGRAM(S): 20 TABLET, DELAYED RELEASE ORAL at 05:47

## 2022-01-01 RX ADMIN — LACTULOSE 10 GRAM(S): 10 SOLUTION ORAL at 13:19

## 2022-01-01 RX ADMIN — SODIUM CHLORIDE 1 GRAM(S): 9 INJECTION INTRAMUSCULAR; INTRAVENOUS; SUBCUTANEOUS at 05:23

## 2022-01-01 RX ADMIN — LACTULOSE 10 GRAM(S): 10 SOLUTION ORAL at 05:27

## 2022-01-01 RX ADMIN — SODIUM CHLORIDE 50 MILLILITER(S): 9 INJECTION, SOLUTION INTRAVENOUS at 18:49

## 2022-01-01 RX ADMIN — Medication 50 MILLILITER(S): at 08:55

## 2022-01-01 RX ADMIN — Medication 5 MILLILITER(S): at 22:14

## 2022-01-01 RX ADMIN — LIDOCAINE 1 PATCH: 4 CREAM TOPICAL at 12:11

## 2022-01-01 RX ADMIN — Medication 100 MILLIGRAM(S): at 11:05

## 2022-01-01 RX ADMIN — SODIUM CHLORIDE 50 MILLILITER(S): 9 INJECTION, SOLUTION INTRAVENOUS at 15:16

## 2022-01-01 RX ADMIN — PANTOPRAZOLE SODIUM 40 MILLIGRAM(S): 20 TABLET, DELAYED RELEASE ORAL at 05:01

## 2022-01-01 RX ADMIN — HEPARIN SODIUM 5000 UNIT(S): 5000 INJECTION INTRAVENOUS; SUBCUTANEOUS at 13:52

## 2022-01-01 RX ADMIN — LACTULOSE 10 GRAM(S): 10 SOLUTION ORAL at 16:29

## 2022-01-01 RX ADMIN — Medication 40 MILLIGRAM(S): at 05:39

## 2022-01-01 RX ADMIN — LIDOCAINE 1 PATCH: 4 CREAM TOPICAL at 12:53

## 2022-01-01 RX ADMIN — Medication 5 MILLILITER(S): at 21:59

## 2022-01-01 RX ADMIN — LACTULOSE 10 GRAM(S): 10 SOLUTION ORAL at 05:47

## 2022-01-01 RX ADMIN — LACTULOSE 10 GRAM(S): 10 SOLUTION ORAL at 11:55

## 2022-01-01 RX ADMIN — Medication 5 MILLILITER(S): at 21:43

## 2022-01-01 RX ADMIN — Medication 20 MILLIEQUIVALENT(S): at 13:36

## 2022-01-01 RX ADMIN — Medication 650 MILLIGRAM(S): at 06:40

## 2022-01-01 RX ADMIN — LACTULOSE 10 GRAM(S): 10 SOLUTION ORAL at 17:19

## 2022-01-01 RX ADMIN — Medication 650 MILLIGRAM(S): at 21:55

## 2022-01-01 RX ADMIN — LIDOCAINE 1 PATCH: 4 CREAM TOPICAL at 00:00

## 2022-01-01 RX ADMIN — LACTULOSE 10 GRAM(S): 10 SOLUTION ORAL at 21:38

## 2022-01-01 RX ADMIN — LACTULOSE 10 GRAM(S): 10 SOLUTION ORAL at 18:14

## 2022-01-01 RX ADMIN — Medication 50 MILLILITER(S): at 17:20

## 2022-01-01 RX ADMIN — LIDOCAINE 1 PATCH: 4 CREAM TOPICAL at 18:15

## 2022-01-01 RX ADMIN — Medication 1 MILLIGRAM(S): at 12:53

## 2022-01-01 RX ADMIN — HEPARIN SODIUM 5000 UNIT(S): 5000 INJECTION INTRAVENOUS; SUBCUTANEOUS at 23:49

## 2022-01-01 RX ADMIN — Medication 5 MILLILITER(S): at 05:27

## 2022-01-01 RX ADMIN — MIDODRINE HYDROCHLORIDE 5 MILLIGRAM(S): 2.5 TABLET ORAL at 05:30

## 2022-01-01 RX ADMIN — Medication 5 MILLILITER(S): at 06:51

## 2022-01-01 RX ADMIN — LACTULOSE 10 GRAM(S): 10 SOLUTION ORAL at 05:29

## 2022-01-01 RX ADMIN — HEPARIN SODIUM 5000 UNIT(S): 5000 INJECTION INTRAVENOUS; SUBCUTANEOUS at 13:54

## 2022-01-01 RX ADMIN — Medication 5 MILLILITER(S): at 12:45

## 2022-01-01 RX ADMIN — Medication 100 MILLIEQUIVALENT(S): at 19:46

## 2022-01-01 RX ADMIN — MIDODRINE HYDROCHLORIDE 10 MILLIGRAM(S): 2.5 TABLET ORAL at 17:24

## 2022-01-01 RX ADMIN — PANTOPRAZOLE SODIUM 40 MILLIGRAM(S): 20 TABLET, DELAYED RELEASE ORAL at 06:12

## 2022-01-01 RX ADMIN — CHOLESTYRAMINE 4 GRAM(S): 4 POWDER, FOR SUSPENSION ORAL at 12:06

## 2022-01-01 RX ADMIN — Medication 100 MILLIGRAM(S): at 13:32

## 2022-01-01 RX ADMIN — MIDODRINE HYDROCHLORIDE 7.5 MILLIGRAM(S): 2.5 TABLET ORAL at 12:05

## 2022-01-01 RX ADMIN — OCTREOTIDE ACETATE 10 MICROGRAM(S)/HR: 200 INJECTION, SOLUTION INTRAVENOUS; SUBCUTANEOUS at 21:38

## 2022-01-01 RX ADMIN — SIMETHICONE 80 MILLIGRAM(S): 80 TABLET, CHEWABLE ORAL at 12:12

## 2022-01-01 RX ADMIN — LIDOCAINE 1 PATCH: 4 CREAM TOPICAL at 12:56

## 2022-01-01 RX ADMIN — LACTULOSE 10 GRAM(S): 10 SOLUTION ORAL at 17:57

## 2022-01-01 RX ADMIN — Medication 5 MILLILITER(S): at 21:23

## 2022-01-01 RX ADMIN — MIDODRINE HYDROCHLORIDE 7.5 MILLIGRAM(S): 2.5 TABLET ORAL at 05:39

## 2022-01-01 RX ADMIN — SIMETHICONE 80 MILLIGRAM(S): 80 TABLET, CHEWABLE ORAL at 12:06

## 2022-01-01 RX ADMIN — SODIUM CHLORIDE 50 MILLILITER(S): 9 INJECTION, SOLUTION INTRAVENOUS at 09:57

## 2022-01-01 RX ADMIN — Medication 1 MILLIGRAM(S): at 13:23

## 2022-01-01 RX ADMIN — Medication 500 MILLIGRAM(S): at 13:18

## 2022-01-01 RX ADMIN — OCTREOTIDE ACETATE 100 MICROGRAM(S): 200 INJECTION, SOLUTION INTRAVENOUS; SUBCUTANEOUS at 05:19

## 2022-01-01 RX ADMIN — LACTULOSE 10 GRAM(S): 10 SOLUTION ORAL at 05:06

## 2022-01-01 RX ADMIN — LIDOCAINE 1 PATCH: 4 CREAM TOPICAL at 13:52

## 2022-01-01 RX ADMIN — Medication 1 MILLIGRAM(S): at 12:50

## 2022-01-01 RX ADMIN — LACTULOSE 10 GRAM(S): 10 SOLUTION ORAL at 18:53

## 2022-01-01 RX ADMIN — LIDOCAINE 1 PATCH: 4 CREAM TOPICAL at 13:19

## 2022-01-01 RX ADMIN — LACTULOSE 10 GRAM(S): 10 SOLUTION ORAL at 05:10

## 2022-01-01 RX ADMIN — LACTULOSE 10 GRAM(S): 10 SOLUTION ORAL at 16:41

## 2022-01-01 RX ADMIN — Medication 500 MILLIGRAM(S): at 12:05

## 2022-01-01 RX ADMIN — CEFTRIAXONE 100 MILLIGRAM(S): 500 INJECTION, POWDER, FOR SOLUTION INTRAMUSCULAR; INTRAVENOUS at 21:15

## 2022-01-01 RX ADMIN — HEPARIN SODIUM 5000 UNIT(S): 5000 INJECTION INTRAVENOUS; SUBCUTANEOUS at 05:24

## 2022-01-01 RX ADMIN — LACTULOSE 10 GRAM(S): 10 SOLUTION ORAL at 12:14

## 2022-01-01 RX ADMIN — LACTULOSE 10 GRAM(S): 10 SOLUTION ORAL at 12:23

## 2022-01-01 RX ADMIN — Medication 1 MILLIGRAM(S): at 12:43

## 2022-01-01 RX ADMIN — SPIRONOLACTONE 100 MILLIGRAM(S): 25 TABLET, FILM COATED ORAL at 05:41

## 2022-01-01 RX ADMIN — LIDOCAINE 1 PATCH: 4 CREAM TOPICAL at 11:14

## 2022-01-01 RX ADMIN — LACTULOSE 10 GRAM(S): 10 SOLUTION ORAL at 11:06

## 2022-01-01 RX ADMIN — Medication 5 MILLILITER(S): at 10:07

## 2022-01-01 RX ADMIN — SIMETHICONE 80 MILLIGRAM(S): 80 TABLET, CHEWABLE ORAL at 11:43

## 2022-01-01 RX ADMIN — Medication 100 MILLIGRAM(S): at 17:46

## 2022-01-01 RX ADMIN — CEFTRIAXONE 100 MILLIGRAM(S): 500 INJECTION, POWDER, FOR SOLUTION INTRAMUSCULAR; INTRAVENOUS at 20:13

## 2022-01-01 RX ADMIN — Medication 5 MILLILITER(S): at 05:34

## 2022-01-01 RX ADMIN — HEPARIN SODIUM 5000 UNIT(S): 5000 INJECTION INTRAVENOUS; SUBCUTANEOUS at 22:08

## 2022-01-01 RX ADMIN — SIMETHICONE 80 MILLIGRAM(S): 80 TABLET, CHEWABLE ORAL at 10:20

## 2022-01-01 RX ADMIN — HEPARIN SODIUM 5000 UNIT(S): 5000 INJECTION INTRAVENOUS; SUBCUTANEOUS at 22:55

## 2022-01-01 RX ADMIN — LACTULOSE 10 GRAM(S): 10 SOLUTION ORAL at 00:26

## 2022-01-01 RX ADMIN — PANTOPRAZOLE SODIUM 40 MILLIGRAM(S): 20 TABLET, DELAYED RELEASE ORAL at 05:36

## 2022-01-01 RX ADMIN — MIDODRINE HYDROCHLORIDE 7.5 MILLIGRAM(S): 2.5 TABLET ORAL at 17:19

## 2022-01-01 RX ADMIN — Medication 5 MILLILITER(S): at 01:02

## 2022-01-01 RX ADMIN — MIDODRINE HYDROCHLORIDE 7.5 MILLIGRAM(S): 2.5 TABLET ORAL at 14:44

## 2022-01-01 RX ADMIN — Medication 5 MILLILITER(S): at 00:58

## 2022-01-01 RX ADMIN — LIDOCAINE 1 PATCH: 4 CREAM TOPICAL at 23:59

## 2022-01-01 RX ADMIN — LACTULOSE 10 GRAM(S): 10 SOLUTION ORAL at 12:44

## 2022-01-01 RX ADMIN — Medication 3 MILLIGRAM(S): at 21:31

## 2022-01-01 RX ADMIN — SPIRONOLACTONE 100 MILLIGRAM(S): 25 TABLET, FILM COATED ORAL at 05:27

## 2022-01-01 RX ADMIN — LACTULOSE 10 GRAM(S): 10 SOLUTION ORAL at 16:51

## 2022-01-01 RX ADMIN — LACTULOSE 10 GRAM(S): 10 SOLUTION ORAL at 17:11

## 2022-01-01 RX ADMIN — Medication 500 MILLIGRAM(S): at 13:32

## 2022-01-01 RX ADMIN — OCTREOTIDE ACETATE 10 MICROGRAM(S)/HR: 200 INJECTION, SOLUTION INTRAVENOUS; SUBCUTANEOUS at 22:34

## 2022-01-01 RX ADMIN — OCTREOTIDE ACETATE 10 MICROGRAM(S)/HR: 200 INJECTION, SOLUTION INTRAVENOUS; SUBCUTANEOUS at 12:55

## 2022-01-01 RX ADMIN — LIDOCAINE 1 PATCH: 4 CREAM TOPICAL at 19:49

## 2022-01-01 RX ADMIN — PANTOPRAZOLE SODIUM 40 MILLIGRAM(S): 20 TABLET, DELAYED RELEASE ORAL at 05:33

## 2022-01-01 RX ADMIN — PANTOPRAZOLE SODIUM 40 MILLIGRAM(S): 20 TABLET, DELAYED RELEASE ORAL at 05:28

## 2022-01-01 RX ADMIN — Medication 5 MILLILITER(S): at 17:56

## 2022-01-01 RX ADMIN — LIDOCAINE 1 PATCH: 4 CREAM TOPICAL at 11:25

## 2022-01-01 RX ADMIN — CHOLESTYRAMINE 4 GRAM(S): 4 POWDER, FOR SUSPENSION ORAL at 22:56

## 2022-01-01 RX ADMIN — HEPARIN SODIUM 5000 UNIT(S): 5000 INJECTION INTRAVENOUS; SUBCUTANEOUS at 13:35

## 2022-01-01 RX ADMIN — Medication 100 MILLIGRAM(S): at 13:19

## 2022-01-01 RX ADMIN — Medication 3 MILLIGRAM(S): at 23:11

## 2022-01-01 RX ADMIN — Medication 1 MILLIGRAM(S): at 11:10

## 2022-01-01 RX ADMIN — Medication 5 MILLILITER(S): at 05:12

## 2022-01-01 RX ADMIN — LIDOCAINE 1 PATCH: 4 CREAM TOPICAL at 23:00

## 2022-01-01 RX ADMIN — PANTOPRAZOLE SODIUM 40 MILLIGRAM(S): 20 TABLET, DELAYED RELEASE ORAL at 05:29

## 2022-01-01 RX ADMIN — Medication 650 MILLIGRAM(S): at 14:26

## 2022-01-01 RX ADMIN — LIDOCAINE 1 PATCH: 4 CREAM TOPICAL at 21:30

## 2022-01-01 RX ADMIN — HEPARIN SODIUM 5000 UNIT(S): 5000 INJECTION INTRAVENOUS; SUBCUTANEOUS at 05:01

## 2022-01-01 RX ADMIN — HEPARIN SODIUM 5000 UNIT(S): 5000 INJECTION INTRAVENOUS; SUBCUTANEOUS at 05:09

## 2022-01-01 RX ADMIN — Medication 650 MILLIGRAM(S): at 15:26

## 2022-01-01 RX ADMIN — Medication 5 MILLILITER(S): at 22:38

## 2022-01-01 RX ADMIN — Medication 5 MILLILITER(S): at 17:53

## 2022-01-01 RX ADMIN — CHOLESTYRAMINE 4 GRAM(S): 4 POWDER, FOR SUSPENSION ORAL at 06:06

## 2022-01-01 RX ADMIN — Medication 30 MILLILITER(S): at 01:22

## 2022-01-01 RX ADMIN — Medication 20 MILLIEQUIVALENT(S): at 12:06

## 2022-01-01 RX ADMIN — Medication 500 MILLIGRAM(S): at 17:46

## 2022-01-01 RX ADMIN — Medication 100 MILLIGRAM(S): at 11:37

## 2022-01-01 RX ADMIN — Medication 5 MILLILITER(S): at 22:41

## 2022-01-01 RX ADMIN — LACTULOSE 10 GRAM(S): 10 SOLUTION ORAL at 01:02

## 2022-01-01 RX ADMIN — LACTULOSE 10 GRAM(S): 10 SOLUTION ORAL at 18:07

## 2022-01-01 RX ADMIN — Medication 5 MILLILITER(S): at 16:30

## 2022-01-01 RX ADMIN — LACTULOSE 10 GRAM(S): 10 SOLUTION ORAL at 05:16

## 2022-01-01 RX ADMIN — LACTULOSE 10 GRAM(S): 10 SOLUTION ORAL at 17:18

## 2022-01-01 RX ADMIN — Medication 650 MILLIGRAM(S): at 05:20

## 2022-01-01 RX ADMIN — Medication 100 MILLIGRAM(S): at 12:05

## 2022-01-01 RX ADMIN — PANTOPRAZOLE SODIUM 40 MILLIGRAM(S): 20 TABLET, DELAYED RELEASE ORAL at 06:10

## 2022-01-01 RX ADMIN — Medication 5 MILLILITER(S): at 13:19

## 2022-01-01 RX ADMIN — MIDODRINE HYDROCHLORIDE 7.5 MILLIGRAM(S): 2.5 TABLET ORAL at 13:32

## 2022-01-01 RX ADMIN — Medication 1 MILLIGRAM(S): at 12:04

## 2022-01-01 RX ADMIN — LACTULOSE 10 GRAM(S): 10 SOLUTION ORAL at 00:57

## 2022-01-01 RX ADMIN — Medication 5 MILLILITER(S): at 02:13

## 2022-01-01 RX ADMIN — OCTREOTIDE ACETATE 100 MICROGRAM(S): 200 INJECTION, SOLUTION INTRAVENOUS; SUBCUTANEOUS at 21:29

## 2022-01-01 RX ADMIN — Medication 1 MILLIGRAM(S): at 12:15

## 2022-01-01 RX ADMIN — Medication 400 MILLIGRAM(S): at 18:35

## 2022-01-01 RX ADMIN — LACTULOSE 10 GRAM(S): 10 SOLUTION ORAL at 23:11

## 2022-01-01 RX ADMIN — LACTULOSE 10 GRAM(S): 10 SOLUTION ORAL at 17:47

## 2022-01-01 RX ADMIN — LACTULOSE 10 GRAM(S): 10 SOLUTION ORAL at 18:02

## 2022-01-01 RX ADMIN — Medication 5 MILLILITER(S): at 06:07

## 2022-01-01 RX ADMIN — Medication 1 MILLIGRAM(S): at 13:36

## 2022-01-01 RX ADMIN — Medication 500 MILLIGRAM(S): at 14:43

## 2022-01-01 RX ADMIN — Medication 40 MILLIGRAM(S): at 05:12

## 2022-01-01 RX ADMIN — Medication 100 MILLIGRAM(S): at 13:23

## 2022-01-01 RX ADMIN — Medication 500 MILLIGRAM(S): at 12:52

## 2022-01-01 RX ADMIN — OCTREOTIDE ACETATE 100 MICROGRAM(S): 200 INJECTION, SOLUTION INTRAVENOUS; SUBCUTANEOUS at 21:03

## 2022-01-01 RX ADMIN — LACTULOSE 10 GRAM(S): 10 SOLUTION ORAL at 06:13

## 2022-01-01 RX ADMIN — PANTOPRAZOLE SODIUM 40 MILLIGRAM(S): 20 TABLET, DELAYED RELEASE ORAL at 05:39

## 2022-01-01 RX ADMIN — MIDODRINE HYDROCHLORIDE 7.5 MILLIGRAM(S): 2.5 TABLET ORAL at 13:23

## 2022-01-01 RX ADMIN — MIDODRINE HYDROCHLORIDE 7.5 MILLIGRAM(S): 2.5 TABLET ORAL at 05:13

## 2022-01-01 RX ADMIN — Medication 50 MILLILITER(S): at 10:00

## 2022-01-01 RX ADMIN — SODIUM CHLORIDE 50 MILLILITER(S): 9 INJECTION, SOLUTION INTRAVENOUS at 22:35

## 2022-01-01 RX ADMIN — Medication 1 MILLIGRAM(S): at 13:18

## 2022-01-01 RX ADMIN — LIDOCAINE 1 PATCH: 4 CREAM TOPICAL at 12:43

## 2022-01-01 RX ADMIN — Medication 500 MILLIGRAM(S): at 12:24

## 2022-01-01 RX ADMIN — MIDODRINE HYDROCHLORIDE 7.5 MILLIGRAM(S): 2.5 TABLET ORAL at 05:28

## 2022-01-01 RX ADMIN — Medication 500 MILLIGRAM(S): at 10:20

## 2022-01-01 RX ADMIN — Medication 100 MILLIEQUIVALENT(S): at 12:33

## 2022-01-01 RX ADMIN — Medication 5 MILLIGRAM(S): at 18:44

## 2022-01-01 RX ADMIN — Medication 100 MILLIEQUIVALENT(S): at 20:14

## 2022-01-01 RX ADMIN — HEPARIN SODIUM 5000 UNIT(S): 5000 INJECTION INTRAVENOUS; SUBCUTANEOUS at 21:56

## 2022-01-01 RX ADMIN — SPIRONOLACTONE 100 MILLIGRAM(S): 25 TABLET, FILM COATED ORAL at 05:02

## 2022-01-01 RX ADMIN — OCTREOTIDE ACETATE 10 MICROGRAM(S)/HR: 200 INJECTION, SOLUTION INTRAVENOUS; SUBCUTANEOUS at 09:57

## 2022-01-01 RX ADMIN — Medication 5 MILLILITER(S): at 13:20

## 2022-01-01 RX ADMIN — Medication 50 MILLILITER(S): at 12:08

## 2022-01-01 RX ADMIN — Medication 500 MILLIGRAM(S): at 12:06

## 2022-01-01 RX ADMIN — OCTREOTIDE ACETATE 10 MICROGRAM(S)/HR: 200 INJECTION, SOLUTION INTRAVENOUS; SUBCUTANEOUS at 10:29

## 2022-01-01 RX ADMIN — Medication 100 MILLIGRAM(S): at 12:15

## 2022-01-01 RX ADMIN — LACTULOSE 10 GRAM(S): 10 SOLUTION ORAL at 23:40

## 2022-01-01 RX ADMIN — SODIUM CHLORIDE 50 MILLILITER(S): 9 INJECTION, SOLUTION INTRAVENOUS at 12:55

## 2022-01-01 RX ADMIN — MIDODRINE HYDROCHLORIDE 7.5 MILLIGRAM(S): 2.5 TABLET ORAL at 17:55

## 2022-01-01 RX ADMIN — LACTULOSE 10 GRAM(S): 10 SOLUTION ORAL at 17:52

## 2022-01-01 RX ADMIN — SODIUM CHLORIDE 50 MILLILITER(S): 9 INJECTION, SOLUTION INTRAVENOUS at 17:15

## 2022-01-01 RX ADMIN — Medication 5 MILLILITER(S): at 10:10

## 2022-01-01 RX ADMIN — Medication 5 MILLIGRAM(S): at 13:47

## 2022-01-01 RX ADMIN — LACTULOSE 10 GRAM(S): 10 SOLUTION ORAL at 05:40

## 2022-01-01 RX ADMIN — MIDODRINE HYDROCHLORIDE 7.5 MILLIGRAM(S): 2.5 TABLET ORAL at 17:11

## 2022-01-01 RX ADMIN — OCTREOTIDE ACETATE 100 MICROGRAM(S): 200 INJECTION, SOLUTION INTRAVENOUS; SUBCUTANEOUS at 14:44

## 2022-01-01 RX ADMIN — Medication 5 MILLILITER(S): at 13:33

## 2022-01-01 RX ADMIN — SODIUM CHLORIDE 1 GRAM(S): 9 INJECTION INTRAMUSCULAR; INTRAVENOUS; SUBCUTANEOUS at 16:29

## 2022-01-01 RX ADMIN — MIDODRINE HYDROCHLORIDE 5 MILLIGRAM(S): 2.5 TABLET ORAL at 06:07

## 2022-01-01 RX ADMIN — Medication 5 MILLILITER(S): at 05:42

## 2022-01-01 RX ADMIN — Medication 5 MILLILITER(S): at 21:28

## 2022-01-01 RX ADMIN — Medication 1 MILLIGRAM(S): at 11:42

## 2022-01-01 RX ADMIN — Medication 500 MILLIGRAM(S): at 11:11

## 2022-01-01 RX ADMIN — Medication 5 MILLIGRAM(S): at 22:29

## 2022-01-01 RX ADMIN — LIDOCAINE 1 PATCH: 4 CREAM TOPICAL at 00:23

## 2022-01-01 RX ADMIN — Medication 20 MILLIEQUIVALENT(S): at 11:10

## 2022-01-01 RX ADMIN — LACTULOSE 10 GRAM(S): 10 SOLUTION ORAL at 12:05

## 2022-01-01 RX ADMIN — Medication 100 MILLIGRAM(S): at 11:42

## 2022-01-01 RX ADMIN — LACTULOSE 10 GRAM(S): 10 SOLUTION ORAL at 23:08

## 2022-01-01 RX ADMIN — Medication 5 MILLILITER(S): at 17:41

## 2022-01-01 RX ADMIN — CHOLESTYRAMINE 4 GRAM(S): 4 POWDER, FOR SUSPENSION ORAL at 21:22

## 2022-01-01 RX ADMIN — Medication 100 MILLIEQUIVALENT(S): at 17:19

## 2022-01-01 RX ADMIN — Medication 500 MILLIGRAM(S): at 11:07

## 2022-01-01 RX ADMIN — CHOLESTYRAMINE 4 GRAM(S): 4 POWDER, FOR SUSPENSION ORAL at 12:43

## 2022-01-01 RX ADMIN — HEPARIN SODIUM 5000 UNIT(S): 5000 INJECTION INTRAVENOUS; SUBCUTANEOUS at 22:24

## 2022-01-01 RX ADMIN — LACTULOSE 10 GRAM(S): 10 SOLUTION ORAL at 01:10

## 2022-01-01 RX ADMIN — LACTULOSE 10 GRAM(S): 10 SOLUTION ORAL at 00:00

## 2022-01-01 RX ADMIN — LACTULOSE 10 GRAM(S): 10 SOLUTION ORAL at 18:25

## 2022-01-01 RX ADMIN — Medication 100 MILLIGRAM(S): at 12:06

## 2022-01-01 RX ADMIN — LACTULOSE 10 GRAM(S): 10 SOLUTION ORAL at 11:18

## 2022-01-01 RX ADMIN — Medication 5 MILLILITER(S): at 13:14

## 2022-01-01 RX ADMIN — LACTULOSE 10 GRAM(S): 10 SOLUTION ORAL at 00:27

## 2022-01-01 RX ADMIN — Medication 100 MILLIGRAM(S): at 10:20

## 2022-01-01 RX ADMIN — Medication 5 MILLILITER(S): at 13:40

## 2022-01-01 RX ADMIN — HEPARIN SODIUM 5000 UNIT(S): 5000 INJECTION INTRAVENOUS; SUBCUTANEOUS at 12:09

## 2022-01-01 RX ADMIN — LACTULOSE 10 GRAM(S): 10 SOLUTION ORAL at 23:28

## 2022-01-01 RX ADMIN — Medication 100 MILLIGRAM(S): at 11:55

## 2022-01-01 RX ADMIN — LACTULOSE 10 GRAM(S): 10 SOLUTION ORAL at 00:07

## 2022-01-01 RX ADMIN — MIDODRINE HYDROCHLORIDE 7.5 MILLIGRAM(S): 2.5 TABLET ORAL at 05:27

## 2022-01-01 RX ADMIN — LIDOCAINE 1 PATCH: 4 CREAM TOPICAL at 21:11

## 2022-01-01 RX ADMIN — SIMETHICONE 80 MILLIGRAM(S): 80 TABLET, CHEWABLE ORAL at 17:57

## 2022-01-01 RX ADMIN — HEPARIN SODIUM 5000 UNIT(S): 5000 INJECTION INTRAVENOUS; SUBCUTANEOUS at 05:08

## 2022-01-01 RX ADMIN — PANTOPRAZOLE SODIUM 40 MILLIGRAM(S): 20 TABLET, DELAYED RELEASE ORAL at 05:41

## 2022-01-01 RX ADMIN — Medication 40 MILLIGRAM(S): at 05:36

## 2022-01-01 RX ADMIN — LIDOCAINE 1 PATCH: 4 CREAM TOPICAL at 19:30

## 2022-01-01 RX ADMIN — LACTULOSE 10 GRAM(S): 10 SOLUTION ORAL at 00:16

## 2022-01-01 RX ADMIN — Medication 3 MILLIGRAM(S): at 21:02

## 2022-01-01 RX ADMIN — Medication 50 MILLILITER(S): at 05:26

## 2022-01-01 RX ADMIN — Medication 5 MILLILITER(S): at 11:18

## 2022-01-01 RX ADMIN — Medication 1 MILLIGRAM(S): at 14:43

## 2022-01-01 RX ADMIN — LIDOCAINE 1 PATCH: 4 CREAM TOPICAL at 11:48

## 2022-01-01 RX ADMIN — Medication 40 MILLIEQUIVALENT(S): at 10:16

## 2022-01-01 RX ADMIN — LACTULOSE 10 GRAM(S): 10 SOLUTION ORAL at 17:02

## 2022-01-01 RX ADMIN — LACTULOSE 10 GRAM(S): 10 SOLUTION ORAL at 06:07

## 2022-01-01 RX ADMIN — OCTREOTIDE ACETATE 10 MICROGRAM(S)/HR: 200 INJECTION, SOLUTION INTRAVENOUS; SUBCUTANEOUS at 19:36

## 2022-01-01 RX ADMIN — LACTULOSE 10 GRAM(S): 10 SOLUTION ORAL at 23:58

## 2022-01-01 RX ADMIN — LACTULOSE 10 GRAM(S): 10 SOLUTION ORAL at 05:42

## 2022-01-01 RX ADMIN — SIMETHICONE 80 MILLIGRAM(S): 80 TABLET, CHEWABLE ORAL at 11:38

## 2022-01-01 RX ADMIN — MIDODRINE HYDROCHLORIDE 7.5 MILLIGRAM(S): 2.5 TABLET ORAL at 11:37

## 2022-01-01 RX ADMIN — Medication 5 MILLILITER(S): at 16:12

## 2022-01-01 RX ADMIN — Medication 100 MILLIGRAM(S): at 11:18

## 2022-01-01 RX ADMIN — CEFTRIAXONE 100 MILLIGRAM(S): 500 INJECTION, POWDER, FOR SOLUTION INTRAMUSCULAR; INTRAVENOUS at 20:14

## 2022-01-01 RX ADMIN — Medication 5 MILLIGRAM(S): at 12:17

## 2022-01-01 RX ADMIN — MIDODRINE HYDROCHLORIDE 7.5 MILLIGRAM(S): 2.5 TABLET ORAL at 05:12

## 2022-01-01 RX ADMIN — Medication 100 MILLIGRAM(S): at 13:18

## 2022-01-01 RX ADMIN — Medication 20 MILLIEQUIVALENT(S): at 12:32

## 2022-01-01 RX ADMIN — LACTULOSE 10 GRAM(S): 10 SOLUTION ORAL at 13:33

## 2022-01-01 RX ADMIN — HEPARIN SODIUM 5000 UNIT(S): 5000 INJECTION INTRAVENOUS; SUBCUTANEOUS at 22:31

## 2022-01-01 RX ADMIN — Medication 5 MILLILITER(S): at 02:04

## 2022-01-01 RX ADMIN — Medication 20 MILLIEQUIVALENT(S): at 13:54

## 2022-01-01 RX ADMIN — MIDODRINE HYDROCHLORIDE 7.5 MILLIGRAM(S): 2.5 TABLET ORAL at 12:52

## 2022-01-01 RX ADMIN — Medication 5 MILLILITER(S): at 17:23

## 2022-01-01 RX ADMIN — LACTULOSE 10 GRAM(S): 10 SOLUTION ORAL at 13:51

## 2022-01-01 RX ADMIN — LIDOCAINE 1 PATCH: 4 CREAM TOPICAL at 13:37

## 2022-01-01 RX ADMIN — Medication 5 MILLILITER(S): at 22:02

## 2022-01-01 RX ADMIN — Medication 20 MILLIEQUIVALENT(S): at 12:03

## 2022-01-01 RX ADMIN — LIDOCAINE 1 PATCH: 4 CREAM TOPICAL at 12:27

## 2022-01-01 RX ADMIN — SIMETHICONE 80 MILLIGRAM(S): 80 TABLET, CHEWABLE ORAL at 12:04

## 2022-01-01 RX ADMIN — Medication 5 MILLILITER(S): at 21:47

## 2022-01-01 RX ADMIN — OCTREOTIDE ACETATE 100 MICROGRAM(S): 200 INJECTION, SOLUTION INTRAVENOUS; SUBCUTANEOUS at 22:14

## 2022-01-01 RX ADMIN — Medication 5 MILLILITER(S): at 05:46

## 2022-01-01 RX ADMIN — MIDODRINE HYDROCHLORIDE 7.5 MILLIGRAM(S): 2.5 TABLET ORAL at 18:09

## 2022-01-01 RX ADMIN — Medication 5 MILLILITER(S): at 18:09

## 2022-01-01 RX ADMIN — SIMETHICONE 80 MILLIGRAM(S): 80 TABLET, CHEWABLE ORAL at 13:32

## 2022-01-01 RX ADMIN — Medication 100 MILLIGRAM(S): at 12:03

## 2022-01-01 RX ADMIN — LACTULOSE 10 GRAM(S): 10 SOLUTION ORAL at 08:51

## 2022-01-01 RX ADMIN — LACTULOSE 10 GRAM(S): 10 SOLUTION ORAL at 18:54

## 2022-01-01 RX ADMIN — CEFTRIAXONE 100 MILLIGRAM(S): 500 INJECTION, POWDER, FOR SOLUTION INTRAMUSCULAR; INTRAVENOUS at 19:00

## 2022-01-01 RX ADMIN — MIDODRINE HYDROCHLORIDE 10 MILLIGRAM(S): 2.5 TABLET ORAL at 05:00

## 2022-01-01 RX ADMIN — OCTREOTIDE ACETATE 10 MICROGRAM(S)/HR: 200 INJECTION, SOLUTION INTRAVENOUS; SUBCUTANEOUS at 17:14

## 2022-01-01 RX ADMIN — Medication 100 MILLIGRAM(S): at 11:10

## 2022-01-01 RX ADMIN — MIDODRINE HYDROCHLORIDE 7.5 MILLIGRAM(S): 2.5 TABLET ORAL at 18:53

## 2022-01-01 RX ADMIN — Medication 5 MILLILITER(S): at 05:56

## 2022-01-01 RX ADMIN — LACTULOSE 10 GRAM(S): 10 SOLUTION ORAL at 23:55

## 2022-01-01 RX ADMIN — Medication 1 MILLIGRAM(S): at 13:54

## 2022-01-01 RX ADMIN — Medication 5 MILLILITER(S): at 06:13

## 2022-01-01 RX ADMIN — MIDODRINE HYDROCHLORIDE 7.5 MILLIGRAM(S): 2.5 TABLET ORAL at 05:40

## 2022-01-01 RX ADMIN — SIMETHICONE 80 MILLIGRAM(S): 80 TABLET, CHEWABLE ORAL at 11:59

## 2022-01-01 RX ADMIN — LACTULOSE 10 GRAM(S): 10 SOLUTION ORAL at 05:33

## 2022-01-01 RX ADMIN — Medication 30 MILLILITER(S): at 12:06

## 2022-01-01 RX ADMIN — Medication 5 MILLIGRAM(S): at 11:10

## 2022-01-01 RX ADMIN — OCTREOTIDE ACETATE 100 MICROGRAM(S): 200 INJECTION, SOLUTION INTRAVENOUS; SUBCUTANEOUS at 13:47

## 2022-01-01 RX ADMIN — Medication 5 MILLIGRAM(S): at 12:56

## 2022-01-01 RX ADMIN — LIDOCAINE 1 PATCH: 4 CREAM TOPICAL at 02:50

## 2022-01-01 RX ADMIN — SODIUM CHLORIDE 50 MILLILITER(S): 9 INJECTION, SOLUTION INTRAVENOUS at 10:29

## 2022-01-01 RX ADMIN — Medication 650 MILLIGRAM(S): at 20:07

## 2022-01-01 RX ADMIN — Medication 102 MILLIGRAM(S): at 16:08

## 2022-01-01 RX ADMIN — Medication 5 MILLILITER(S): at 15:20

## 2022-01-01 RX ADMIN — Medication 102 MILLIGRAM(S): at 12:24

## 2022-01-01 RX ADMIN — Medication 5 MILLILITER(S): at 22:35

## 2022-01-01 RX ADMIN — SPIRONOLACTONE 100 MILLIGRAM(S): 25 TABLET, FILM COATED ORAL at 15:10

## 2022-01-01 RX ADMIN — MIDODRINE HYDROCHLORIDE 7.5 MILLIGRAM(S): 2.5 TABLET ORAL at 16:52

## 2022-01-01 RX ADMIN — Medication 5 MILLILITER(S): at 05:18

## 2022-01-01 RX ADMIN — LACTULOSE 10 GRAM(S): 10 SOLUTION ORAL at 05:26

## 2022-01-01 RX ADMIN — Medication 5 MILLILITER(S): at 22:31

## 2022-01-01 RX ADMIN — MIDODRINE HYDROCHLORIDE 7.5 MILLIGRAM(S): 2.5 TABLET ORAL at 05:46

## 2022-01-01 RX ADMIN — LIDOCAINE 1 PATCH: 4 CREAM TOPICAL at 20:00

## 2022-01-01 RX ADMIN — MIDODRINE HYDROCHLORIDE 5 MILLIGRAM(S): 2.5 TABLET ORAL at 11:06

## 2022-01-01 RX ADMIN — Medication 5 MILLILITER(S): at 13:46

## 2022-01-01 RX ADMIN — LACTULOSE 10 GRAM(S): 10 SOLUTION ORAL at 16:53

## 2022-01-01 RX ADMIN — Medication 100 MILLIGRAM(S): at 13:53

## 2022-01-01 RX ADMIN — PANTOPRAZOLE SODIUM 40 MILLIGRAM(S): 20 TABLET, DELAYED RELEASE ORAL at 05:57

## 2022-01-01 RX ADMIN — LACTULOSE 10 GRAM(S): 10 SOLUTION ORAL at 00:33

## 2022-01-01 RX ADMIN — Medication 30 MILLILITER(S): at 20:25

## 2022-01-01 RX ADMIN — LIDOCAINE 1 PATCH: 4 CREAM TOPICAL at 01:12

## 2022-01-01 RX ADMIN — Medication 5 MILLILITER(S): at 13:13

## 2022-01-01 RX ADMIN — MIDODRINE HYDROCHLORIDE 7.5 MILLIGRAM(S): 2.5 TABLET ORAL at 12:40

## 2022-01-01 RX ADMIN — MIDODRINE HYDROCHLORIDE 10 MILLIGRAM(S): 2.5 TABLET ORAL at 17:53

## 2022-01-01 RX ADMIN — LACTULOSE 10 GRAM(S): 10 SOLUTION ORAL at 17:28

## 2022-01-01 RX ADMIN — MIDODRINE HYDROCHLORIDE 7.5 MILLIGRAM(S): 2.5 TABLET ORAL at 13:18

## 2022-01-01 RX ADMIN — LACTULOSE 10 GRAM(S): 10 SOLUTION ORAL at 06:06

## 2022-01-01 RX ADMIN — Medication 1 MILLIGRAM(S): at 08:52

## 2022-01-01 RX ADMIN — Medication 5 MILLILITER(S): at 14:48

## 2022-01-01 RX ADMIN — SIMETHICONE 80 MILLIGRAM(S): 80 TABLET, CHEWABLE ORAL at 12:53

## 2022-01-01 RX ADMIN — HEPARIN SODIUM 5000 UNIT(S): 5000 INJECTION INTRAVENOUS; SUBCUTANEOUS at 05:38

## 2022-01-01 RX ADMIN — CEFTRIAXONE 100 MILLIGRAM(S): 500 INJECTION, POWDER, FOR SOLUTION INTRAMUSCULAR; INTRAVENOUS at 21:04

## 2022-01-01 RX ADMIN — Medication 40 MILLIGRAM(S): at 05:08

## 2022-01-01 RX ADMIN — MIDODRINE HYDROCHLORIDE 7.5 MILLIGRAM(S): 2.5 TABLET ORAL at 05:58

## 2022-01-01 RX ADMIN — PANTOPRAZOLE SODIUM 40 MILLIGRAM(S): 20 TABLET, DELAYED RELEASE ORAL at 05:44

## 2022-01-01 RX ADMIN — OCTREOTIDE ACETATE 10 MICROGRAM(S)/HR: 200 INJECTION, SOLUTION INTRAVENOUS; SUBCUTANEOUS at 18:28

## 2022-01-01 RX ADMIN — Medication 5 MILLILITER(S): at 16:40

## 2022-01-01 RX ADMIN — Medication 1 MILLIGRAM(S): at 10:20

## 2022-01-01 RX ADMIN — Medication 102 MILLIGRAM(S): at 11:14

## 2022-01-01 RX ADMIN — LACTULOSE 10 GRAM(S): 10 SOLUTION ORAL at 13:18

## 2022-01-01 RX ADMIN — Medication 5 MILLILITER(S): at 12:44

## 2022-01-01 RX ADMIN — LIDOCAINE 1 PATCH: 4 CREAM TOPICAL at 12:08

## 2022-01-01 RX ADMIN — Medication 5 MILLIGRAM(S): at 11:41

## 2022-01-01 RX ADMIN — Medication 5 MILLILITER(S): at 05:36

## 2022-01-01 RX ADMIN — Medication 20 MILLIEQUIVALENT(S): at 12:50

## 2022-01-01 RX ADMIN — Medication 5 MILLILITER(S): at 00:14

## 2022-01-01 RX ADMIN — LIDOCAINE 1 PATCH: 4 CREAM TOPICAL at 21:27

## 2022-01-01 RX ADMIN — OCTREOTIDE ACETATE 10 MICROGRAM(S)/HR: 200 INJECTION, SOLUTION INTRAVENOUS; SUBCUTANEOUS at 18:06

## 2022-01-01 RX ADMIN — Medication 100 MILLIEQUIVALENT(S): at 15:50

## 2022-01-01 RX ADMIN — Medication 650 MILLIGRAM(S): at 10:33

## 2022-01-01 RX ADMIN — LACTULOSE 10 GRAM(S): 10 SOLUTION ORAL at 13:53

## 2022-01-01 RX ADMIN — Medication 5 MILLILITER(S): at 17:11

## 2022-01-01 RX ADMIN — Medication 100 MILLIEQUIVALENT(S): at 11:17

## 2022-01-01 RX ADMIN — SODIUM CHLORIDE 1 GRAM(S): 9 INJECTION INTRAMUSCULAR; INTRAVENOUS; SUBCUTANEOUS at 05:28

## 2022-01-01 RX ADMIN — LACTULOSE 10 GRAM(S): 10 SOLUTION ORAL at 11:39

## 2022-01-01 RX ADMIN — Medication 1 MILLIGRAM(S): at 11:38

## 2022-01-01 RX ADMIN — Medication 50 MILLILITER(S): at 18:25

## 2022-01-01 RX ADMIN — Medication 20 MILLIEQUIVALENT(S): at 11:39

## 2022-01-01 RX ADMIN — LACTULOSE 10 GRAM(S): 10 SOLUTION ORAL at 17:59

## 2022-01-01 RX ADMIN — LACTULOSE 10 GRAM(S): 10 SOLUTION ORAL at 01:39

## 2022-01-01 RX ADMIN — Medication 5 MILLILITER(S): at 13:24

## 2022-01-01 RX ADMIN — SIMETHICONE 80 MILLIGRAM(S): 80 TABLET, CHEWABLE ORAL at 11:19

## 2022-01-01 RX ADMIN — OCTREOTIDE ACETATE 100 MICROGRAM(S): 200 INJECTION, SOLUTION INTRAVENOUS; SUBCUTANEOUS at 21:24

## 2022-01-01 RX ADMIN — LIDOCAINE 1 PATCH: 4 CREAM TOPICAL at 12:55

## 2022-01-01 RX ADMIN — LACTULOSE 10 GRAM(S): 10 SOLUTION ORAL at 16:47

## 2022-01-01 RX ADMIN — MIDODRINE HYDROCHLORIDE 7.5 MILLIGRAM(S): 2.5 TABLET ORAL at 17:52

## 2022-01-01 RX ADMIN — Medication 5 MILLILITER(S): at 01:17

## 2022-01-01 RX ADMIN — Medication 40 MILLIGRAM(S): at 05:01

## 2022-01-01 RX ADMIN — Medication 50 MILLILITER(S): at 05:34

## 2022-01-01 RX ADMIN — PANTOPRAZOLE SODIUM 40 MILLIGRAM(S): 20 TABLET, DELAYED RELEASE ORAL at 05:18

## 2022-01-01 RX ADMIN — MIDODRINE HYDROCHLORIDE 7.5 MILLIGRAM(S): 2.5 TABLET ORAL at 05:43

## 2022-01-01 RX ADMIN — PANTOPRAZOLE SODIUM 40 MILLIGRAM(S): 20 TABLET, DELAYED RELEASE ORAL at 06:07

## 2022-01-01 RX ADMIN — HEPARIN SODIUM 5000 UNIT(S): 5000 INJECTION INTRAVENOUS; SUBCUTANEOUS at 15:10

## 2022-01-01 RX ADMIN — Medication 1 MILLIGRAM(S): at 13:32

## 2022-01-01 RX ADMIN — LACTULOSE 10 GRAM(S): 10 SOLUTION ORAL at 05:34

## 2022-01-01 RX ADMIN — SIMETHICONE 80 MILLIGRAM(S): 80 TABLET, CHEWABLE ORAL at 12:43

## 2022-01-01 RX ADMIN — Medication 5 MILLILITER(S): at 05:28

## 2022-01-01 RX ADMIN — MIDODRINE HYDROCHLORIDE 7.5 MILLIGRAM(S): 2.5 TABLET ORAL at 11:43

## 2022-01-01 RX ADMIN — LACTULOSE 10 GRAM(S): 10 SOLUTION ORAL at 23:53

## 2022-01-01 RX ADMIN — LACTULOSE 10 GRAM(S): 10 SOLUTION ORAL at 13:22

## 2022-01-01 RX ADMIN — MIDODRINE HYDROCHLORIDE 7.5 MILLIGRAM(S): 2.5 TABLET ORAL at 13:19

## 2022-01-01 RX ADMIN — MIDODRINE HYDROCHLORIDE 7.5 MILLIGRAM(S): 2.5 TABLET ORAL at 18:47

## 2022-01-01 RX ADMIN — LACTULOSE 10 GRAM(S): 10 SOLUTION ORAL at 05:38

## 2022-01-01 RX ADMIN — Medication 500 MILLIGRAM(S): at 12:45

## 2022-01-01 RX ADMIN — SIMETHICONE 80 MILLIGRAM(S): 80 TABLET, CHEWABLE ORAL at 12:02

## 2022-01-01 RX ADMIN — Medication 30 MILLILITER(S): at 05:32

## 2022-01-01 RX ADMIN — Medication 1 MILLIGRAM(S): at 13:51

## 2022-01-01 RX ADMIN — SIMETHICONE 80 MILLIGRAM(S): 80 TABLET, CHEWABLE ORAL at 13:53

## 2022-01-01 RX ADMIN — Medication 30 MILLILITER(S): at 22:01

## 2022-01-01 RX ADMIN — SODIUM CHLORIDE 50 MILLILITER(S): 9 INJECTION, SOLUTION INTRAVENOUS at 19:22

## 2022-01-01 RX ADMIN — Medication 102 MILLIGRAM(S): at 11:55

## 2022-01-01 RX ADMIN — Medication 5 MILLIGRAM(S): at 12:14

## 2022-01-01 RX ADMIN — Medication 25 GRAM(S): at 15:01

## 2022-01-01 RX ADMIN — Medication 100 MILLIGRAM(S): at 14:35

## 2022-01-01 RX ADMIN — Medication 5 MILLILITER(S): at 01:57

## 2022-01-01 RX ADMIN — LIDOCAINE 1 PATCH: 4 CREAM TOPICAL at 12:16

## 2022-01-01 RX ADMIN — Medication 5 MILLILITER(S): at 17:19

## 2022-01-01 RX ADMIN — LACTULOSE 10 GRAM(S): 10 SOLUTION ORAL at 12:09

## 2022-01-01 RX ADMIN — Medication 5 MILLILITER(S): at 17:52

## 2022-01-01 RX ADMIN — LIDOCAINE 1 PATCH: 4 CREAM TOPICAL at 00:53

## 2022-01-01 RX ADMIN — LACTULOSE 10 GRAM(S): 10 SOLUTION ORAL at 17:23

## 2022-01-01 RX ADMIN — ONDANSETRON 4 MILLIGRAM(S): 8 TABLET, FILM COATED ORAL at 22:07

## 2022-01-01 RX ADMIN — Medication 50 MILLILITER(S): at 05:11

## 2022-01-01 RX ADMIN — HEPARIN SODIUM 5000 UNIT(S): 5000 INJECTION INTRAVENOUS; SUBCUTANEOUS at 21:05

## 2022-01-01 RX ADMIN — Medication 5 MILLILITER(S): at 09:56

## 2022-01-01 RX ADMIN — Medication 5 MILLILITER(S): at 05:17

## 2022-01-01 RX ADMIN — Medication 5 MILLILITER(S): at 21:19

## 2022-01-01 RX ADMIN — LACTULOSE 10 GRAM(S): 10 SOLUTION ORAL at 23:30

## 2022-01-01 RX ADMIN — Medication 1 MILLIGRAM(S): at 12:23

## 2022-01-01 RX ADMIN — LIDOCAINE 1 PATCH: 4 CREAM TOPICAL at 13:54

## 2022-01-01 RX ADMIN — Medication 100 MILLIGRAM(S): at 12:43

## 2022-01-01 RX ADMIN — SIMETHICONE 80 MILLIGRAM(S): 80 TABLET, CHEWABLE ORAL at 11:06

## 2022-01-01 RX ADMIN — Medication 40 MILLIGRAM(S): at 10:35

## 2022-01-01 RX ADMIN — Medication 5 MILLILITER(S): at 02:57

## 2022-01-01 RX ADMIN — Medication 500 MILLIGRAM(S): at 12:43

## 2022-01-01 RX ADMIN — LIDOCAINE 1 PATCH: 4 CREAM TOPICAL at 12:29

## 2022-01-01 RX ADMIN — OCTREOTIDE ACETATE 100 MICROGRAM(S): 200 INJECTION, SOLUTION INTRAVENOUS; SUBCUTANEOUS at 11:07

## 2022-01-01 RX ADMIN — SODIUM CHLORIDE 1 GRAM(S): 9 INJECTION INTRAMUSCULAR; INTRAVENOUS; SUBCUTANEOUS at 17:59

## 2022-01-01 RX ADMIN — LACTULOSE 10 GRAM(S): 10 SOLUTION ORAL at 11:36

## 2022-01-01 RX ADMIN — Medication 5 MILLILITER(S): at 17:47

## 2022-01-01 RX ADMIN — Medication 5 MILLILITER(S): at 17:24

## 2022-01-01 RX ADMIN — HEPARIN SODIUM 5000 UNIT(S): 5000 INJECTION INTRAVENOUS; SUBCUTANEOUS at 05:23

## 2022-01-01 RX ADMIN — OCTREOTIDE ACETATE 100 MICROGRAM(S): 200 INJECTION, SOLUTION INTRAVENOUS; SUBCUTANEOUS at 05:31

## 2022-01-01 RX ADMIN — OCTREOTIDE ACETATE 100 MICROGRAM(S): 200 INJECTION, SOLUTION INTRAVENOUS; SUBCUTANEOUS at 05:43

## 2022-01-01 RX ADMIN — CEFTRIAXONE 100 MILLIGRAM(S): 500 INJECTION, POWDER, FOR SOLUTION INTRAMUSCULAR; INTRAVENOUS at 22:29

## 2022-01-01 RX ADMIN — Medication 500 MILLIGRAM(S): at 11:37

## 2022-01-01 RX ADMIN — Medication 5 MILLILITER(S): at 21:01

## 2022-01-01 RX ADMIN — Medication 5 MILLILITER(S): at 03:08

## 2022-01-01 RX ADMIN — LACTULOSE 10 GRAM(S): 10 SOLUTION ORAL at 00:46

## 2022-01-01 RX ADMIN — OCTREOTIDE ACETATE 10 MICROGRAM(S)/HR: 200 INJECTION, SOLUTION INTRAVENOUS; SUBCUTANEOUS at 22:46

## 2022-01-01 RX ADMIN — Medication 5 MILLILITER(S): at 05:25

## 2022-01-01 RX ADMIN — Medication 5 MILLILITER(S): at 10:54

## 2022-01-01 RX ADMIN — LIDOCAINE 1 PATCH: 4 CREAM TOPICAL at 22:23

## 2022-01-01 RX ADMIN — LACTULOSE 10 GRAM(S): 10 SOLUTION ORAL at 12:43

## 2022-01-01 RX ADMIN — SIMETHICONE 80 MILLIGRAM(S): 80 TABLET, CHEWABLE ORAL at 13:18

## 2022-01-01 RX ADMIN — SIMETHICONE 80 MILLIGRAM(S): 80 TABLET, CHEWABLE ORAL at 12:14

## 2022-01-01 RX ADMIN — Medication 5 MILLILITER(S): at 05:00

## 2022-01-01 RX ADMIN — MIDODRINE HYDROCHLORIDE 7.5 MILLIGRAM(S): 2.5 TABLET ORAL at 17:02

## 2022-01-01 RX ADMIN — Medication 30 MILLILITER(S): at 05:39

## 2022-01-01 RX ADMIN — HEPARIN SODIUM 5000 UNIT(S): 5000 INJECTION INTRAVENOUS; SUBCUTANEOUS at 22:33

## 2022-01-01 RX ADMIN — Medication 5 MILLILITER(S): at 13:03

## 2022-01-01 RX ADMIN — SODIUM CHLORIDE 1 GRAM(S): 9 INJECTION INTRAMUSCULAR; INTRAVENOUS; SUBCUTANEOUS at 05:16

## 2022-01-01 RX ADMIN — Medication 20 MILLIEQUIVALENT(S): at 08:52

## 2022-01-01 RX ADMIN — LACTULOSE 10 GRAM(S): 10 SOLUTION ORAL at 17:55

## 2022-01-01 RX ADMIN — Medication 5 MILLIGRAM(S): at 18:01

## 2022-01-01 RX ADMIN — Medication 1 MILLIGRAM(S): at 11:55

## 2022-01-01 RX ADMIN — SODIUM CHLORIDE 1 GRAM(S): 9 INJECTION INTRAMUSCULAR; INTRAVENOUS; SUBCUTANEOUS at 16:50

## 2022-01-01 RX ADMIN — MIDODRINE HYDROCHLORIDE 7.5 MILLIGRAM(S): 2.5 TABLET ORAL at 18:27

## 2022-01-01 RX ADMIN — Medication 5 MILLILITER(S): at 01:08

## 2022-01-01 RX ADMIN — Medication 40 MILLIGRAM(S): at 08:52

## 2022-01-01 RX ADMIN — Medication 50 MILLILITER(S): at 18:48

## 2022-01-01 RX ADMIN — Medication 5 MILLILITER(S): at 18:48

## 2022-01-01 RX ADMIN — Medication 5 MILLILITER(S): at 01:18

## 2022-01-01 RX ADMIN — Medication 5 MILLILITER(S): at 14:35

## 2022-01-01 RX ADMIN — Medication 1 MILLIGRAM(S): at 11:06

## 2022-01-01 RX ADMIN — Medication 1 MILLIGRAM(S): at 12:06

## 2022-01-01 RX ADMIN — SODIUM CHLORIDE 1 GRAM(S): 9 INJECTION INTRAMUSCULAR; INTRAVENOUS; SUBCUTANEOUS at 12:15

## 2022-01-01 RX ADMIN — LACTULOSE 10 GRAM(S): 10 SOLUTION ORAL at 23:13

## 2022-01-01 RX ADMIN — MIDODRINE HYDROCHLORIDE 10 MILLIGRAM(S): 2.5 TABLET ORAL at 06:07

## 2022-01-01 RX ADMIN — LIDOCAINE 1 PATCH: 4 CREAM TOPICAL at 19:20

## 2022-01-01 RX ADMIN — Medication 50 MILLILITER(S): at 05:14

## 2022-01-01 RX ADMIN — SPIRONOLACTONE 100 MILLIGRAM(S): 25 TABLET, FILM COATED ORAL at 05:36

## 2022-01-01 RX ADMIN — LACTULOSE 10 GRAM(S): 10 SOLUTION ORAL at 16:32

## 2022-01-01 RX ADMIN — CHOLESTYRAMINE 4 GRAM(S): 4 POWDER, FOR SUSPENSION ORAL at 09:56

## 2022-01-01 RX ADMIN — Medication 50 MILLILITER(S): at 17:24

## 2022-01-01 RX ADMIN — Medication 5 MILLIGRAM(S): at 18:50

## 2022-01-01 RX ADMIN — LIDOCAINE 1 PATCH: 4 CREAM TOPICAL at 12:49

## 2022-01-01 RX ADMIN — Medication 5 MILLILITER(S): at 22:58

## 2022-01-01 RX ADMIN — LACTULOSE 10 GRAM(S): 10 SOLUTION ORAL at 00:30

## 2022-01-01 RX ADMIN — SODIUM CHLORIDE 50 MILLILITER(S): 9 INJECTION, SOLUTION INTRAVENOUS at 15:51

## 2022-01-01 RX ADMIN — Medication 5 MILLILITER(S): at 18:53

## 2022-01-01 RX ADMIN — Medication 5 MILLILITER(S): at 22:56

## 2022-01-01 RX ADMIN — SODIUM CHLORIDE 1000 MILLILITER(S): 9 INJECTION INTRAMUSCULAR; INTRAVENOUS; SUBCUTANEOUS at 14:56

## 2022-01-01 RX ADMIN — OCTREOTIDE ACETATE 100 MICROGRAM(S): 200 INJECTION, SOLUTION INTRAVENOUS; SUBCUTANEOUS at 13:14

## 2022-01-01 RX ADMIN — OCTREOTIDE ACETATE 100 MICROGRAM(S): 200 INJECTION, SOLUTION INTRAVENOUS; SUBCUTANEOUS at 21:30

## 2022-01-01 RX ADMIN — MIDODRINE HYDROCHLORIDE 7.5 MILLIGRAM(S): 2.5 TABLET ORAL at 17:24

## 2022-01-01 RX ADMIN — Medication 5 MILLILITER(S): at 05:20

## 2022-01-01 RX ADMIN — HEPARIN SODIUM 5000 UNIT(S): 5000 INJECTION INTRAVENOUS; SUBCUTANEOUS at 16:00

## 2022-01-01 RX ADMIN — LACTULOSE 10 GRAM(S): 10 SOLUTION ORAL at 12:04

## 2022-01-01 RX ADMIN — Medication 5 MILLILITER(S): at 17:46

## 2022-01-01 RX ADMIN — LACTULOSE 10 GRAM(S): 10 SOLUTION ORAL at 17:24

## 2022-01-01 RX ADMIN — OCTREOTIDE ACETATE 10 MICROGRAM(S)/HR: 200 INJECTION, SOLUTION INTRAVENOUS; SUBCUTANEOUS at 21:42

## 2022-01-01 RX ADMIN — MIDODRINE HYDROCHLORIDE 7.5 MILLIGRAM(S): 2.5 TABLET ORAL at 13:51

## 2022-01-01 RX ADMIN — Medication 5 MILLILITER(S): at 21:02

## 2022-01-01 RX ADMIN — SODIUM CHLORIDE 1 GRAM(S): 9 INJECTION INTRAMUSCULAR; INTRAVENOUS; SUBCUTANEOUS at 05:11

## 2022-01-01 RX ADMIN — OCTREOTIDE ACETATE 10 MICROGRAM(S)/HR: 200 INJECTION, SOLUTION INTRAVENOUS; SUBCUTANEOUS at 21:20

## 2022-01-01 RX ADMIN — Medication 5 MILLILITER(S): at 17:02

## 2022-01-01 RX ADMIN — Medication 1 MILLIGRAM(S): at 17:46

## 2022-01-01 RX ADMIN — Medication 1 MILLIGRAM(S): at 12:45

## 2022-01-01 RX ADMIN — LACTULOSE 10 GRAM(S): 10 SOLUTION ORAL at 05:19

## 2022-01-01 RX ADMIN — PANTOPRAZOLE SODIUM 40 MILLIGRAM(S): 20 TABLET, DELAYED RELEASE ORAL at 05:24

## 2022-01-01 RX ADMIN — PANTOPRAZOLE SODIUM 40 MILLIGRAM(S): 20 TABLET, DELAYED RELEASE ORAL at 05:21

## 2022-01-01 RX ADMIN — SODIUM CHLORIDE 3000 MILLILITER(S): 9 INJECTION INTRAMUSCULAR; INTRAVENOUS; SUBCUTANEOUS at 17:00

## 2022-01-01 RX ADMIN — Medication 102 MILLIGRAM(S): at 12:42

## 2022-01-01 RX ADMIN — HEPARIN SODIUM 5000 UNIT(S): 5000 INJECTION INTRAVENOUS; SUBCUTANEOUS at 14:26

## 2022-01-01 RX ADMIN — LACTULOSE 10 GRAM(S): 10 SOLUTION ORAL at 17:33

## 2022-01-01 RX ADMIN — SODIUM CHLORIDE 50 MILLILITER(S): 9 INJECTION, SOLUTION INTRAVENOUS at 21:37

## 2022-01-01 RX ADMIN — Medication 5 MILLILITER(S): at 03:31

## 2022-01-01 RX ADMIN — Medication 5 MILLIGRAM(S): at 13:19

## 2022-01-01 RX ADMIN — Medication 5 MILLILITER(S): at 06:08

## 2022-01-01 RX ADMIN — SODIUM CHLORIDE 1 GRAM(S): 9 INJECTION INTRAMUSCULAR; INTRAVENOUS; SUBCUTANEOUS at 18:43

## 2022-01-01 RX ADMIN — LACTULOSE 10 GRAM(S): 10 SOLUTION ORAL at 18:44

## 2022-01-01 RX ADMIN — CHOLESTYRAMINE 4 GRAM(S): 4 POWDER, FOR SUSPENSION ORAL at 22:47

## 2022-01-01 RX ADMIN — HEPARIN SODIUM 5000 UNIT(S): 5000 INJECTION INTRAVENOUS; SUBCUTANEOUS at 21:09

## 2022-01-01 RX ADMIN — Medication 5 MILLILITER(S): at 10:38

## 2022-01-01 RX ADMIN — Medication 1 MILLIGRAM(S): at 11:18

## 2022-01-01 RX ADMIN — MIDODRINE HYDROCHLORIDE 7.5 MILLIGRAM(S): 2.5 TABLET ORAL at 05:33

## 2022-01-01 RX ADMIN — LACTULOSE 10 GRAM(S): 10 SOLUTION ORAL at 11:42

## 2022-01-01 RX ADMIN — HEPARIN SODIUM 5000 UNIT(S): 5000 INJECTION INTRAVENOUS; SUBCUTANEOUS at 05:12

## 2022-01-01 RX ADMIN — LIDOCAINE 1 PATCH: 4 CREAM TOPICAL at 17:47

## 2022-01-01 RX ADMIN — Medication 100 MILLIGRAM(S): at 14:43

## 2022-01-01 RX ADMIN — SIMETHICONE 80 MILLIGRAM(S): 80 TABLET, CHEWABLE ORAL at 12:24

## 2022-01-01 RX ADMIN — Medication 5 MILLILITER(S): at 17:59

## 2022-01-01 RX ADMIN — Medication 5 MILLILITER(S): at 18:26

## 2022-01-01 RX ADMIN — OCTREOTIDE ACETATE 10 MICROGRAM(S)/HR: 200 INJECTION, SOLUTION INTRAVENOUS; SUBCUTANEOUS at 05:34

## 2022-01-01 RX ADMIN — Medication 30 MILLILITER(S): at 05:18

## 2022-01-01 RX ADMIN — OCTREOTIDE ACETATE 100 MICROGRAM(S): 200 INJECTION, SOLUTION INTRAVENOUS; SUBCUTANEOUS at 05:30

## 2022-01-01 RX ADMIN — Medication 40 MILLIGRAM(S): at 06:08

## 2022-01-01 RX ADMIN — SIMETHICONE 80 MILLIGRAM(S): 80 TABLET, CHEWABLE ORAL at 14:44

## 2022-01-01 RX ADMIN — Medication 500 MILLIGRAM(S): at 13:51

## 2022-01-01 RX ADMIN — OCTREOTIDE ACETATE 100 MICROGRAM(S): 200 INJECTION, SOLUTION INTRAVENOUS; SUBCUTANEOUS at 10:20

## 2022-01-01 RX ADMIN — LIDOCAINE 1 PATCH: 4 CREAM TOPICAL at 10:21

## 2022-01-01 RX ADMIN — Medication 500 MILLIGRAM(S): at 11:55

## 2022-01-01 RX ADMIN — Medication 5 MILLILITER(S): at 02:25

## 2022-01-01 RX ADMIN — Medication 5 MILLILITER(S): at 12:55

## 2022-01-01 RX ADMIN — HEPARIN SODIUM 5000 UNIT(S): 5000 INJECTION INTRAVENOUS; SUBCUTANEOUS at 06:05

## 2022-01-01 RX ADMIN — OCTREOTIDE ACETATE 100 MICROGRAM(S): 200 INJECTION, SOLUTION INTRAVENOUS; SUBCUTANEOUS at 06:11

## 2022-01-01 RX ADMIN — Medication 5 MILLILITER(S): at 10:00

## 2022-01-01 RX ADMIN — MIDODRINE HYDROCHLORIDE 7.5 MILLIGRAM(S): 2.5 TABLET ORAL at 11:19

## 2022-01-01 RX ADMIN — Medication 5 MILLILITER(S): at 11:10

## 2022-01-01 RX ADMIN — OCTREOTIDE ACETATE 10 MICROGRAM(S)/HR: 200 INJECTION, SOLUTION INTRAVENOUS; SUBCUTANEOUS at 13:55

## 2022-01-01 RX ADMIN — OCTREOTIDE ACETATE 100 MICROGRAM(S): 200 INJECTION, SOLUTION INTRAVENOUS; SUBCUTANEOUS at 05:13

## 2022-01-01 RX ADMIN — PANTOPRAZOLE SODIUM 40 MILLIGRAM(S): 20 TABLET, DELAYED RELEASE ORAL at 05:30

## 2022-01-01 RX ADMIN — Medication 5 MILLIGRAM(S): at 12:39

## 2022-01-01 RX ADMIN — SIMETHICONE 80 MILLIGRAM(S): 80 TABLET, CHEWABLE ORAL at 17:56

## 2022-01-01 RX ADMIN — Medication 5 MILLILITER(S): at 12:56

## 2022-01-01 RX ADMIN — OCTREOTIDE ACETATE 100 MICROGRAM(S): 200 INJECTION, SOLUTION INTRAVENOUS; SUBCUTANEOUS at 13:24

## 2022-01-01 RX ADMIN — Medication 5 MILLIGRAM(S): at 12:16

## 2022-01-01 RX ADMIN — Medication 100 MILLIGRAM(S): at 12:52

## 2022-01-01 RX ADMIN — Medication 5 MILLILITER(S): at 10:05

## 2022-01-01 RX ADMIN — HEPARIN SODIUM 5000 UNIT(S): 5000 INJECTION INTRAVENOUS; SUBCUTANEOUS at 12:43

## 2022-01-01 RX ADMIN — SIMETHICONE 80 MILLIGRAM(S): 80 TABLET, CHEWABLE ORAL at 12:05

## 2022-01-01 RX ADMIN — OCTREOTIDE ACETATE 10 MICROGRAM(S)/HR: 200 INJECTION, SOLUTION INTRAVENOUS; SUBCUTANEOUS at 21:28

## 2022-01-01 RX ADMIN — Medication 1 MILLIGRAM(S): at 11:37

## 2022-01-01 RX ADMIN — Medication 500 MILLIGRAM(S): at 11:42

## 2022-01-01 RX ADMIN — Medication 100 MILLIEQUIVALENT(S): at 13:42

## 2022-01-01 RX ADMIN — Medication 5 MILLILITER(S): at 14:02

## 2022-01-01 RX ADMIN — SODIUM CHLORIDE 50 MILLILITER(S): 9 INJECTION, SOLUTION INTRAVENOUS at 18:29

## 2022-01-01 RX ADMIN — Medication 100 MILLIGRAM(S): at 13:51

## 2022-01-01 RX ADMIN — SODIUM CHLORIDE 50 MILLILITER(S): 9 INJECTION, SOLUTION INTRAVENOUS at 13:43

## 2022-01-01 RX ADMIN — Medication 5 MILLILITER(S): at 23:09

## 2022-01-01 RX ADMIN — Medication 20 MILLIEQUIVALENT(S): at 12:43

## 2022-01-01 RX ADMIN — LACTULOSE 10 GRAM(S): 10 SOLUTION ORAL at 05:45

## 2022-01-01 RX ADMIN — CEFTRIAXONE 100 MILLIGRAM(S): 500 INJECTION, POWDER, FOR SOLUTION INTRAMUSCULAR; INTRAVENOUS at 20:58

## 2022-01-01 RX ADMIN — Medication 500 MILLIGRAM(S): at 11:19

## 2022-01-01 RX ADMIN — LACTULOSE 10 GRAM(S): 10 SOLUTION ORAL at 14:43

## 2022-01-01 RX ADMIN — LACTULOSE 10 GRAM(S): 10 SOLUTION ORAL at 01:08

## 2022-01-01 RX ADMIN — Medication 500 MILLIGRAM(S): at 12:04

## 2022-01-01 RX ADMIN — Medication 650 MILLIGRAM(S): at 12:22

## 2022-01-01 RX ADMIN — LACTULOSE 10 GRAM(S): 10 SOLUTION ORAL at 11:11

## 2022-01-01 RX ADMIN — Medication 1 MILLIGRAM(S): at 10:22

## 2022-01-01 RX ADMIN — MIDODRINE HYDROCHLORIDE 7.5 MILLIGRAM(S): 2.5 TABLET ORAL at 05:20

## 2022-01-01 RX ADMIN — LACTULOSE 10 GRAM(S): 10 SOLUTION ORAL at 21:05

## 2022-01-01 RX ADMIN — Medication 5 MILLILITER(S): at 21:38

## 2022-01-01 RX ADMIN — Medication 50 MILLILITER(S): at 17:59

## 2022-01-01 RX ADMIN — LIDOCAINE 1 PATCH: 4 CREAM TOPICAL at 01:08

## 2022-01-01 RX ADMIN — Medication 5 MILLILITER(S): at 10:53

## 2022-01-01 RX ADMIN — Medication 20 MILLIEQUIVALENT(S): at 12:14

## 2022-01-01 RX ADMIN — LACTULOSE 10 GRAM(S): 10 SOLUTION ORAL at 05:56

## 2022-01-01 RX ADMIN — Medication 50 MILLILITER(S): at 05:09

## 2022-01-01 RX ADMIN — LACTULOSE 10 GRAM(S): 10 SOLUTION ORAL at 18:47

## 2022-01-01 RX ADMIN — LIDOCAINE 1 PATCH: 4 CREAM TOPICAL at 14:50

## 2022-01-01 RX ADMIN — Medication 40 MILLIEQUIVALENT(S): at 14:54

## 2022-01-01 RX ADMIN — OCTREOTIDE ACETATE 100 MICROGRAM(S): 200 INJECTION, SOLUTION INTRAVENOUS; SUBCUTANEOUS at 00:14

## 2022-01-01 RX ADMIN — LACTULOSE 10 GRAM(S): 10 SOLUTION ORAL at 10:20

## 2022-01-01 RX ADMIN — Medication 5 MILLILITER(S): at 12:06

## 2022-01-01 RX ADMIN — SODIUM CHLORIDE 50 MILLILITER(S): 9 INJECTION, SOLUTION INTRAVENOUS at 14:48

## 2022-01-01 RX ADMIN — Medication 100 MILLIEQUIVALENT(S): at 16:45

## 2022-08-08 ENCOUNTER — OUTPATIENT (OUTPATIENT)
Dept: OUTPATIENT SERVICES | Facility: HOSPITAL | Age: 47
LOS: 1 days | End: 2022-08-08

## 2022-08-08 ENCOUNTER — INPATIENT (INPATIENT)
Facility: HOSPITAL | Age: 47
LOS: 6 days | Discharge: HOME | End: 2022-08-15
Attending: FAMILY MEDICINE

## 2022-08-08 PROCEDURE — 71045 X-RAY EXAM CHEST 1 VIEW: CPT | Mod: 26

## 2022-08-08 PROCEDURE — 76705 ECHO EXAM OF ABDOMEN: CPT | Mod: 26

## 2022-08-08 PROCEDURE — 70450 CT HEAD/BRAIN W/O DYE: CPT | Mod: 26

## 2022-08-08 PROCEDURE — 99285 EMERGENCY DEPT VISIT HI MDM: CPT

## 2022-08-08 PROCEDURE — 93010 ELECTROCARDIOGRAM REPORT: CPT

## 2022-08-09 ENCOUNTER — OUTPATIENT (OUTPATIENT)
Dept: OUTPATIENT SERVICES | Facility: HOSPITAL | Age: 47
LOS: 1 days | End: 2022-08-09

## 2022-08-10 ENCOUNTER — OUTPATIENT (OUTPATIENT)
Dept: OUTPATIENT SERVICES | Facility: HOSPITAL | Age: 47
LOS: 1 days | End: 2022-08-10

## 2022-08-11 ENCOUNTER — OUTPATIENT (OUTPATIENT)
Dept: OUTPATIENT SERVICES | Facility: HOSPITAL | Age: 47
LOS: 1 days | End: 2022-08-11

## 2022-08-12 ENCOUNTER — OUTPATIENT (OUTPATIENT)
Dept: OUTPATIENT SERVICES | Facility: HOSPITAL | Age: 47
LOS: 1 days | End: 2022-08-12

## 2022-08-13 ENCOUNTER — OUTPATIENT (OUTPATIENT)
Dept: OUTPATIENT SERVICES | Facility: HOSPITAL | Age: 47
LOS: 1 days | End: 2022-08-13

## 2022-08-14 ENCOUNTER — OUTPATIENT (OUTPATIENT)
Dept: OUTPATIENT SERVICES | Facility: HOSPITAL | Age: 47
LOS: 1 days | End: 2022-08-14

## 2022-08-15 ENCOUNTER — OUTPATIENT (OUTPATIENT)
Dept: OUTPATIENT SERVICES | Facility: HOSPITAL | Age: 47
LOS: 1 days | End: 2022-08-15

## 2022-08-20 ENCOUNTER — EMERGENCY (EMERGENCY)
Facility: HOSPITAL | Age: 47
LOS: 1 days | Discharge: HOME | End: 2022-08-20

## 2022-08-20 DIAGNOSIS — E87.6 HYPOKALEMIA: ICD-10-CM

## 2022-08-20 DIAGNOSIS — E87.1 HYPO-OSMOLALITY AND HYPONATREMIA: ICD-10-CM

## 2022-08-20 DIAGNOSIS — I10 ESSENTIAL (PRIMARY) HYPERTENSION: ICD-10-CM

## 2022-08-20 DIAGNOSIS — K72.90 HEPATIC FAILURE, UNSPECIFIED WITHOUT COMA: ICD-10-CM

## 2022-08-20 DIAGNOSIS — R18.8 OTHER ASCITES: ICD-10-CM

## 2022-08-20 DIAGNOSIS — R14.0 ABDOMINAL DISTENSION (GASEOUS): ICD-10-CM

## 2022-08-20 PROCEDURE — 99285 EMERGENCY DEPT VISIT HI MDM: CPT

## 2022-08-20 PROCEDURE — 93010 ELECTROCARDIOGRAM REPORT: CPT

## 2022-08-20 PROCEDURE — 71045 X-RAY EXAM CHEST 1 VIEW: CPT | Mod: 26

## 2022-08-20 NOTE — ED PROVIDER NOTE - ATTENDING CONTRIBUTION TO CARE
The patient seen and examined    End Stage Liver Failure    I, Manuel White, performed the initial face to face bedside interview with this patient regarding history of present illness, review of symptoms and relevant past medical, social and family history.  I completed an independent physical examination.  I was the initial provider who evaluated this patient. I have signed out the follow up of any pending tests (i.e. labs, radiological studies) to the resident.  I have communicated the patient’s plan of care and disposition with the resident

## 2022-08-20 NOTE — H&P ADULT - NSHPLABSRESULTS_GEN_ALL_CORE
LABS:                         10.6   16.16 )-----------( 67       ( 20 Aug 2022 13:50 )             30.0     08-    123<L>  |  96<L>  |  7.3<L>  ----------------------------<  121<H>  3.1<L>   |  17.0<L>  |  <0.20<L>    Ca    7.3<L>      20 Aug 2022 13:50    TPro  6.2<L>  /  Alb  2.1<L>  /  TBili  26.2<H>  /  DBili  x   /  AST  117<H>  /  ALT  107<H>  /  AlkPhos  282<H>  08-20      Urinalysis Basic - ( 20 Aug 2022 14:25 )    Color: Gail / Appearance: Slightly Turbid / S.010 / pH: x  Gluc: x / Ketone: Negative  / Bili: Large / Urobili: 8   Blood: x / Protein: 30 mg/dL / Nitrite: Positive   Leuk Esterase: Trace / RBC: 3-5 /HPF / WBC 3-5 /HPF   Sq Epi: x / Non Sq Epi: Occasional / Bacteria: Few      CARDIAC MARKERS ( 20 Aug 2022 13:50 )  x     / <0.01 ng/mL / x     / x     / x                RADIOLOGY, EKG & ADDITIONAL TESTS:    CXR Neg 23-Mar-2019 06:59

## 2022-08-20 NOTE — H&P ADULT - NSHPPHYSICALEXAM_GEN_ALL_CORE
PHYSICAL EXAM:    General: severe jaundice, in NAD  Eyes: PERRLA, EOMI; scleral icterus  Head: Normocephalic; atraumatic  ENMT: No nasal discharge; airway clear  Neck: Supple; non tender; no masses  Respiratory: No wheezes, rales or rhonchi  Cardiovascular: Regular rate and rhythm. S1 and S2 Normal; No murmurs, gallops or rubs  Gastrointestinal: distended, icteric, spider nevis and telangectasias noted, non tender, fluid wave present  Genitourinary: No costovertebral angle tenderness  Extremities: Normal range of motion, No clubbing or joint   Vascular: Peripheral pulses palpable 2+ bilaterally  Neurological: Alert and oriented x4  Skin: Warm and dry, jaundice  Lymph Nodes: No acute cervical adenopathy  Musculoskeletal: Normal gait, tone, without deformities  Psychiatric: Cooperative and appropriate

## 2022-08-20 NOTE — ED PROVIDER NOTE - OBJECTIVE STATEMENT
46 y/o M pt w/ PMHx of cirrhosis transferred from [place] for abd swelling x3 days. Pt reports he has not had alcohol in months. Pt was transferred from [place] for gastroenterology evaluation. He denies fever, chills, N/V/D, pain, or any other complaints.

## 2022-08-20 NOTE — ED ADULT NURSE NOTE - CHIEF COMPLAINT QUOTE
Patient presents to ER transferred from [facility], abdominal pain and distention, patient admits to drinking heavily, last drink 8/9, IV potassium infusing, jaundice noted, patient awake, alert and oriented X4, resp even/unlabored.

## 2022-08-20 NOTE — H&P ADULT - HISTORY OF PRESENT ILLNESS
46 y/o M w/ a hx of ?Deep Vein Thrombosis (not on AC as per pt), Hypertension, Alcohol use disorder, liver failure transferred to [facility] from [facility] for liver cirrhosis. Pt has an extensive hx of etoh abuse, last drink 3 weeks prior. Recently discharged from [facility] on 8/15 for treatment of alcoholic liver disease. States that over the past two days he has noticed bloating in the abdomen, denying any CP, SOB, Abd pain, paresthesias, fevers, fatigue. Transferred for further GI workup. Seen at bedside, alert and cooperative. Endorses compliance w/ medications and alcohol cessation. ROS Neg other than mentioned.  FHx: Father - DM; Mother non contributory  PSHx: None  SHx: Never smoker; last ETOH 3 weeks ago; No recreational drugs

## 2022-08-20 NOTE — H&P ADULT - ASSESSMENT
46 y/o M w/ a hx of ?Deep Vein Thrombosis (not on AC as per pt), ?Hypertension (Not on meds), Alcohol use disorder presenting for mgmt of alcoholic liver cirrhosis    Alcoholic liver cirrhosis w/ ascites  hx of alcoholic liver disease 2/2 etoh abuse  last drink 3 weeks ago  transferred to [facility] for GI workup  WBC 16  Anemia likely 2/2   plan:  Admit to medicine  ceftriaxone for SBP ppx  lactulose 15mL Q12  hepatitis panel  trend CMP  Abdominal US  trend coags  IR for paracentesis  GI consult      Malnutrition 2/2 alcohol abuse hx w/ Macrocytic Anemia, Electrolyte Abnormalities  Ascites noted  macrocytic anemia   on FA at home  Hypokalemia, on KCl and Magnesium at home  Hyponatremia    plan:  Anemia workup w/ Iron, Folate, B12  trend cbc  c/w FA 1mg Q24  Start KCl 20mEQ Q24  Maintain Mg >2, Phos >4, K >4    ?HTN  Not on meds  stable BP    plan:  trend vitals Q6  start BP meds as needed    ?DVT Hx  unknown source    plan:  Obtain US Duplex of bilat upper and lower extremities  Start AC based on findings    Healthcare Maintenance  DVT PPx: HSQ until US finalized  Diet: DASH  Dispo: Acutely Sick

## 2022-08-20 NOTE — ED ADULT TRIAGE NOTE - CHIEF COMPLAINT QUOTE
Patient presents to ER transferred from [place], abdominal pain and distention, patient admits to drinking heavily, last drink 8/9, IV potassium infusing, patient awake, alert and oriented X4, resp even/unlabored. Patient presents to ER transferred from [facility], abdominal pain and distention, patient admits to drinking heavily, last drink 8/9, IV potassium infusing, jaundice noted, patient awake, alert and oriented X4, resp even/unlabored.

## 2022-08-20 NOTE — H&P ADULT - NS ATTEST RISK PROBLEM GEN_ALL_CORE FT
End Stage Liver Disease requiring Invasive procedures, monitoring and low threshold for MICU consult

## 2022-08-20 NOTE — ED ADULT NURSE NOTE - OBJECTIVE STATEMENT
"Patient presents to ER transferred from [facility], abdominal pain and distention, patient admits to drinking heavily, last drink 8/9, IV potassium infusing, jaundice noted, patient awake, alert and oriented X4, resp even/unlabored." Pt placed on telemetry, NSR

## 2022-08-21 NOTE — PATIENT PROFILE ADULT - FALL HARM RISK - HARM RISK INTERVENTIONS

## 2022-08-21 NOTE — CONSULT NOTE ADULT - SUBJECTIVE AND OBJECTIVE BOX
HISTORY OF PRESENT ILLNESS: 46 y/o M with PMH of hypertension, Alcohol use disorder, cirrhosis transferred from [facility] for further evaluation of alcohol induced cirrhosis. Patient was recently admitted at [facility]) after presenting with worsening generalized weakness, jaundice and "abdominal bloating, admitted for alcohol withdrawal and cirrhosis. He was treated with Lactulose, Rifaximin and Prednisolone during the hospitalization. Prednisolone was discontinued prior to discharge as no response on reevaluation with Lillle score. This time he was presented back to [facility] with worsening "abdominal bloating" and jaundice.  Patient endorsed compliance with lactulose and abstinence from ETOH for the past 3 weeks. . Denies fever, chills, nausea, vomiting, chest pain, diarrhea or constipation. In ED his labs reports significant for hyponatremia, sodium 128, thrombocytopenia, platelet count 69K, Total bili 24.7, albumin 2.1, INR 3.16, elevated LFTs AST//104, . US abdomen consistent with liver cirrhosis, mod ascites.  line  for [place] ID # [id number] was used for interview.     Review of Systems:  . Constitutional: No fever, chills,   · ENMT: no vertigo, no throat pain  . Neck: No pain or stiffness  · Respiratory and Thorax: No shortness of breath, no cough, no wheezing  · Cardiovascular: No chest pain, palpitation, no dizziness, no orthopnea,   · Gastrointestinal: see above.  · Genitourinary: No hematuria, no dysuria  · Musculoskeletal: Negative  · Neurological: no headache, no vision changes, no slurred speech  · Psychiatric: no agitation, no anxiety  · Hematology/Lymphatics: negative  · Endocrine: negative      PAST MEDICAL/SURGICAL HISTORY:  Cirrhosis      SOCIAL HISTORY:  - TOBACCO: Denies  - ALCOHOL: 10 beers /day. Last drink 3 weeks ago.  - ILLICIT DRUG USE: Denies    FAMILY HISTORY:  No known history of gastrointestinal or liver disease;      HOME MEDICATIONS:  folic acid 1 mg oral tablet: 1 tab(s) orally once a day (20 Aug 2022 16:44)  lactulose 10 g/15 mL oral syrup: 15 milliliter(s) orally 2 times a day (20 Aug 2022 16:44)  Potassium Chloride (Eqv-Klor-Con 10) 10 mEq oral tablet, extended release: 1 tab(s) orally 2 times a day (20 Aug 2022 16:45)  Slow Magnesium Chloride with Calcium 71 mg-118 mg oral delayed release tablet: 2 tab(s) orally once a day (20 Aug 2022 16:45)    INPATIENT MEDICATIONS:  MEDICATIONS  (STANDING):  cefTRIAXone   IVPB 1000 milliGRAM(s) IV Intermittent every 24 hours  folic acid 1 milliGRAM(s) Oral daily  heparin   Injectable 5000 Unit(s) SubCutaneous every 8 hours  lactulose Syrup 10 Gram(s) Oral two times a day  potassium chloride    Tablet ER 20 milliEquivalent(s) Oral daily    MEDICATIONS  (PRN):  aluminum hydroxide/magnesium hydroxide/simethicone Suspension 30 milliLiter(s) Oral every 4 hours PRN Dyspepsia  melatonin 3 milliGRAM(s) Oral at bedtime PRN Insomnia  ondansetron Injectable 4 milliGRAM(s) IV Push every 8 hours PRN Nausea and/or Vomiting    ALLERGIES:  No Known Allergies    T(C): 36.6 (22 @ 03:12), Max: 36.9 (22 @ 19:54)  HR: 72 (22 @ 03:12) (67 - 82)  BP: 121/78 (22 @ 03:12) (114/73 - 132/86)  RR: 18 (22 @ 03:12) (18 - 20)  SpO2: 98% (22 @ 03:12) (98% - 100%)      PHYSICAL EXAM:  Constitutional: No acute distress  Neuro: Awake alert, oriented to self and place only.  HEENT: PERRL, icteric sclerae,   Neck: supple, no JVD  CV: regular rate, regular rhythm, +S1S2,   Pulm/chest: lung sounds clear, diminished at the bases bilaterally, no accessory muscle use noted  Abd: +Ascites, nontender, +BS. No rigidity, rebound tenderness, guarding   Ext:  no Cyanosis, clubbing, +2 BLE edema  Skin: warm,  + jaundice   Psych: calm, coooperative      LABS:             10.6   16.45 )-----------( 69       (  @ 04:34 )             29.6                10.6   16.16 )-----------( 67       (  @ 13:50 )             30.0       PT/INR - ( 21 Aug 2022 04:34 )   PT: 36.9 sec;   INR: 3.14 ratio         PTT - ( 21 Aug 2022 00:36 )  PTT:59.3 sec      128<L>  |  101  |  9.1  ----------------------------<  130<H>  3.6   |  17.0<L>  |  <0.20<L>    Ca    7.4<L>      21 Aug 2022 04:34  Phos  2.5       Mg     2.1         TPro  5.8<L>  /  Alb  2.1<L>  /  TBili  24.7<H>  /  DBili  x   /  AST  119<H>  /  ALT  104<H>  /  AlkPhos  261<H>      LIVER FUNCTIONS - ( 21 Aug 2022 04:34 )  Alb: 2.1 g/dL / Pro: 5.8 g/dL / ALK PHOS: 261 U/L / ALT: 104 U/L / AST: 119 U/L / GGT: x             Lipase, Serum: 52 U/L (22 @ 13:50)      Urinalysis Basic - ( 20 Aug 2022 14:25 )    Color: Gail / Appearance: Slightly Turbid / S.010 / pH: x  Gluc: x / Ketone: Negative  / Bili: Large / Urobili: 8   Blood: x / Protein: 30 mg/dL / Nitrite: Positive   Leuk Esterase: Trace / RBC: 3-5 /HPF / WBC 3-5 /HPF   Sq Epi: x / Non Sq Epi: Occasional / Bacteria: Few    < from: US Abdomen Upper Quadrant Right (22 @ 19:07) >  FINDINGS:  Liver: Increased echogenicity with coarsened echotexture and nodular   contour, compatible cirrhosis. No focal liver mass identified   sonographically.  Bile ducts: CBD is obscured and not visualized. No evidence of   intrahepatic biliary ductal dilatation.  Gallbladder: Contracted and poorly evaluated.  Pancreas: Obscured.  Right kidney: 12.7 cm in length. No hydronephrosis.  Ascites: Moderate to large volume ascites.    IMPRESSION:  Cirrhosis with moderate to large volume ascites. No focal liver mass   identified sonographically.    < end of copied text >      < from: Xray Chest 1 View- PORTABLE-Urgent (22 @ 15:46) >  Shallow inspiration crowds the chest. Heart magnified by technique.    Lungs grossly clear.    Old fracture deformity left clavicle.    IMPRESSION: As above.    < end of copied text >     HISTORY OF PRESENT ILLNESS: 46 y/o M with PMH of hypertension, Alcohol use disorder, cirrhosis transferred from [facility] for further evaluation of alcohol induced cirrhosis. Patient was recently admitted at [facility] (-08/15) after presenting with worsening generalized weakness, jaundice and "abdominal bloating, admitted for alcohol withdrawal and cirrhosis. He was treated with Lactulose, Rifaximin and Prednisolone during the hospitalization. Prednisolone was discontinued prior to discharge as no response on reevaluation with Lillle score. This time he was presented back to [facility] with worsening "abdominal bloating" and jaundice.  Patient endorsed compliance with lactulose and abstinence from ETOH for the past 3 weeks. . Denies fever, chills, nausea, vomiting, chest pain, diarrhea or constipation. In ED his labs reports significant for hyponatremia, sodium 128, thrombocytopenia, platelet count 69K, Total bili 24.7, albumin 2.1, INR 3.16, elevated LFTs AST//104, . US abdomen consistent with liver cirrhosis, mod ascites.  line  for [place] ID # [id number] was used for interview.     Review of Systems:  . Constitutional: No fever, chills,   · ENMT: no vertigo, no throat pain  . Neck: No pain or stiffness  · Respiratory and Thorax: No shortness of breath, no cough, no wheezing  · Cardiovascular: No chest pain, palpitation, no dizziness, no orthopnea,   · Gastrointestinal: see above.  · Genitourinary: No hematuria, no dysuria  · Musculoskeletal: Negative  · Neurological: no headache, no vision changes, no slurred speech  · Psychiatric: no agitation, no anxiety  · Hematology/Lymphatics: negative  · Endocrine: negative      PAST MEDICAL/SURGICAL HISTORY:  Cirrhosis  hypertension      SOCIAL HISTORY:  - TOBACCO: Denies  - ALCOHOL: 10 beers /day. Last drink 3 weeks ago.  - ILLICIT DRUG USE: Denies    FAMILY HISTORY:  No known history of gastrointestinal or liver disease;      HOME MEDICATIONS:  folic acid 1 mg oral tablet: 1 tab(s) orally once a day (20 Aug 2022 16:44)  lactulose 10 g/15 mL oral syrup: 15 milliliter(s) orally 2 times a day (20 Aug 2022 16:44)  Potassium Chloride (Eqv-Klor-Con 10) 10 mEq oral tablet, extended release: 1 tab(s) orally 2 times a day (20 Aug 2022 16:45)  Slow Magnesium Chloride with Calcium 71 mg-118 mg oral delayed release tablet: 2 tab(s) orally once a day (20 Aug 2022 16:45)    INPATIENT MEDICATIONS:  MEDICATIONS  (STANDING):  cefTRIAXone   IVPB 1000 milliGRAM(s) IV Intermittent every 24 hours  folic acid 1 milliGRAM(s) Oral daily  heparin   Injectable 5000 Unit(s) SubCutaneous every 8 hours  lactulose Syrup 10 Gram(s) Oral two times a day  potassium chloride    Tablet ER 20 milliEquivalent(s) Oral daily    MEDICATIONS  (PRN):  aluminum hydroxide/magnesium hydroxide/simethicone Suspension 30 milliLiter(s) Oral every 4 hours PRN Dyspepsia  melatonin 3 milliGRAM(s) Oral at bedtime PRN Insomnia  ondansetron Injectable 4 milliGRAM(s) IV Push every 8 hours PRN Nausea and/or Vomiting    ALLERGIES:  No Known Allergies    T(C): 36.6 (22 @ 03:12), Max: 36.9 (22 @ 19:54)  HR: 72 (22 @ 03:12) (67 - 82)  BP: 121/78 (22 @ 03:12) (114/73 - 132/86)  RR: 18 (22 @ 03:12) (18 - 20)  SpO2: 98% (22 @ 03:12) (98% - 100%)      PHYSICAL EXAM:  Constitutional: No acute distress, jaundiced  Neuro: Awake alert, oriented to self and place only.  HEENT: PERRL, icteric sclerae,   Neck: supple, no JVD  CV: regular rate, regular rhythm, +S1S2,   Pulm/chest: lung sounds clear, diminished at the bases bilaterally, no accessory muscle use noted  Abd: +moderate to marked Ascites, nontender, +BS. No rigidity, rebound tenderness, guarding   Ext:  no Cyanosis, clubbing, +2 BLE edema  Skin: warm,  + jaundice   Psych: calm, coooperative      LABS:             10.6   16.45 )-----------( 69       (  @ 04:34 )             29.6                10.6   16.16 )-----------( 67       (  @ 13:50 )             30.0       PT/INR - ( 21 Aug 2022 04:34 )   PT: 36.9 sec;   INR: 3.14 ratio         PTT - ( 21 Aug 2022 00:36 )  PTT:59.3 sec      128<L>  |  101  |  9.1  ----------------------------<  130<H>  3.6   |  17.0<L>  |  <0.20<L>    Ca    7.4<L>      21 Aug 2022 04:34  Phos  2.5       Mg     2.1         TPro  5.8<L>  /  Alb  2.1<L>  /  TBili  24.7<H>  /  DBili  x   /  AST  119<H>  /  ALT  104<H>  /  AlkPhos  261<H>      LIVER FUNCTIONS - ( 21 Aug 2022 04:34 )  Alb: 2.1 g/dL / Pro: 5.8 g/dL / ALK PHOS: 261 U/L / ALT: 104 U/L / AST: 119 U/L / GGT: x             Lipase, Serum: 52 U/L (22 @ 13:50)      Urinalysis Basic - ( 20 Aug 2022 14:25 )    Color: Gail / Appearance: Slightly Turbid / S.010 / pH: x  Gluc: x / Ketone: Negative  / Bili: Large / Urobili: 8   Blood: x / Protein: 30 mg/dL / Nitrite: Positive   Leuk Esterase: Trace / RBC: 3-5 /HPF / WBC 3-5 /HPF   Sq Epi: x / Non Sq Epi: Occasional / Bacteria: Few    < from: US Abdomen Upper Quadrant Right (22 @ 19:07) >  FINDINGS:  Liver: Increased echogenicity with coarsened echotexture and nodular   contour, compatible cirrhosis. No focal liver mass identified   sonographically.  Bile ducts: CBD is obscured and not visualized. No evidence of   intrahepatic biliary ductal dilatation.  Gallbladder: Contracted and poorly evaluated.  Pancreas: Obscured.  Right kidney: 12.7 cm in length. No hydronephrosis.  Ascites: Moderate to large volume ascites.    IMPRESSION:  Cirrhosis with moderate to large volume ascites. No focal liver mass   identified sonographically.    < end of copied text >      < from: Xray Chest 1 View- PORTABLE-Urgent (08.20.22 @ 15:46) >  Shallow inspiration crowds the chest. Heart magnified by technique.    Lungs grossly clear.    Old fracture deformity left clavicle.    IMPRESSION: As above.    < end of copied text >

## 2022-08-21 NOTE — CONSULT NOTE ADULT - NS ATTEND AMEND GEN_ALL_CORE FT
Patient with very advanced alcoholic cirrhosis who presents with weakness and increasing abdominal distention associated with some abdominal discomfort as a result.  Although his renal function is normal for some reason he is not on any diuretics.  We will therefore start Lasix 40 mg and Aldactone 100 mg.  We will also obtain an alpha-fetoprotein.  Will undergo a CAT scan of the abdomen pelvis with IV contrast for further evaluation of the cirrhosis including whether or not there is any underlying portal hypertension or evidence for hepatocellular carcinoma.  He will require diagnostic and therapeutic paracentesis.  He has already failed prednisolone.  His prognosis is extremely poor.  We will continue antibiotics until the results of paracentesis.  However I highly doubt underlying spontaneous bacterial peritonitis.  The patient has slow mentation probably has mild hepatic encephalopathy.  We will obtain a serum ammonia level and increase lactulose to 20 g twice daily and treat with rifaximin as well.

## 2022-08-21 NOTE — CONSULT NOTE ADULT - ASSESSMENT
46 y/o M with PMH of hypertension, Alcohol use disorder, cirrhosis transferred from [facility] for further evaluation of alcohol induced cirrhosis. Patient was recently admitted at [facility] (08/11-08/11) after presenting with worsening generalized weakness, jaundice and "abdominal bloating, admitted for alcohol withdrawal and cirrhosis. He was treated with Lactulose, Rifaximin and Prednisolone during the hospitalization. Prednisolone was discontinued prior to discharge as no response on reevaluation with Lillle score and was sent home with Lactulose 10 mg BID. Now presented back with worsening "abdominal bloating". Admitted with decompensated alcohol induced cirrhosis with ascites encephalopathy.

## 2022-08-21 NOTE — PROGRESS NOTE ADULT - ASSESSMENT
48 y/o M w/ a hx of Alcohol use disorder presenting for mgmt of alcoholic liver cirrhosis    Alcoholic liver cirrhosis w/ ascites  hx of alcoholic liver disease 2/2 etoh abuse  last drink 3 weeks ago  transferred to [facility] for GI workup  WBC 16  INR elevated  Anemia noted  Hep panel negative    plan:  ceftriaxone for SBP ppx  lactulose 15mL Q12  Rifaximin 550mg Q12  lasix 40mg Q24  Spironolactone 100mg Q24  IV Vitamin K  trend CMP  Abdominal US  trend coags  IR for paracentesis  GI consult      Malnutrition 2/2 alcohol abuse hx w/ Macrocytic Anemia, Electrolyte Abnormalities  Ascites noted  macrocytic anemia   on FA at home  Hypokalemia, on KCl and Magnesium at home  Hyponatremia    plan:  Anemia workup w/ Iron, Folate, B12  trend cbc  c/w FA 1mg Q24  c/w KCl 20mEQ Q24  Maintain Mg >2, Phos >4, K >4    ?HTN  resolved  BP Stable, likely no HTN    plan:  trend vitals Q6  start BP meds as needed    ?DVT Hx  resolved  US neg b/l upper and lower extremities neg  no signs of DVT    plan:  continue DVT PPx and monitor    Healthcare Maintenance  DVT PPx: HSQ  Diet: DASH  Dispo: Acutely Sick

## 2022-08-21 NOTE — PROGRESS NOTE ADULT - SUBJECTIVE AND OBJECTIVE BOX
[facility] Division of Hospital Medicine    Chief Complaint:  ETOH Cirrhosis    SUBJECTIVE / OVERNIGHT EVENTS:  No issues overnight, HD stable. Pt seen at bedside, complaining of abdominal pressure. Denies HA, CP, SOB, Pruritis, NVD, Fever, Lethargy. ROS neg other than mentioned.    MEDICATIONS  (STANDING):  cefTRIAXone   IVPB 1000 milliGRAM(s) IV Intermittent every 24 hours  folic acid 1 milliGRAM(s) Oral daily  furosemide    Tablet 40 milliGRAM(s) Oral daily  heparin   Injectable 5000 Unit(s) SubCutaneous every 8 hours  lactulose Syrup 10 Gram(s) Oral two times a day  phytonadione  IVPB 10 milliGRAM(s) IV Intermittent daily  potassium chloride    Tablet ER 20 milliEquivalent(s) Oral daily  rifAXIMin 550 milliGRAM(s) Oral two times a day  spironolactone 100 milliGRAM(s) Oral daily    MEDICATIONS  (PRN):  aluminum hydroxide/magnesium hydroxide/simethicone Suspension 30 milliLiter(s) Oral every 4 hours PRN Dyspepsia  melatonin 3 milliGRAM(s) Oral at bedtime PRN Insomnia  ondansetron Injectable 4 milliGRAM(s) IV Push every 8 hours PRN Nausea and/or Vomiting        I&O's Summary      PHYSICAL EXAM:  Vital Signs Last 24 Hrs  T(C): 36.4 (21 Aug 2022 11:04), Max: 36.9 (20 Aug 2022 19:54)  T(F): 97.5 (21 Aug 2022 11:04), Max: 98.5 (20 Aug 2022 19:54)  HR: 76 (21 Aug 2022 11:04) (67 - 82)  BP: 128/82 (21 Aug 2022 11:04) (114/73 - 132/86)  BP(mean): --  RR: 18 (21 Aug 2022 07:30) (18 - 20)  SpO2: 99% (21 Aug 2022 07:30) (98% - 100%)    Parameters below as of 21 Aug 2022 07:30  Patient On (Oxygen Delivery Method): room air        General: severe jaundice, in NAD  Eyes: PERRLA, EOMI; scleral icterus  Head: Normocephalic; atraumatic  ENMT: No nasal discharge; airway clear  Neck: Supple; non tender; no masses  Respiratory: No wheezes, rales or rhonchi  Cardiovascular: Regular rate and rhythm. S1 and S2 Normal; No murmurs, gallops or rubs  Gastrointestinal: distended, icteric, spider nevis and telangectasias noted, non tender, fluid wave present  Genitourinary: No costovertebral angle tenderness  Extremities: Normal range of motion, No clubbing or joint   Vascular: Peripheral pulses palpable 2+ bilaterally  Neurological: Alert and oriented x4  Skin: Warm and dry, jaundice  Lymph Nodes: No acute cervical adenopathy  Musculoskeletal: Normal gait, tone, without deformities  Psychiatric: Cooperative and appropriate    LABS:                        10.6   16.45 )-----------( 69       ( 21 Aug 2022 04:34 )             29.6     08-    128<L>  |  101  |  9.1  ----------------------------<  130<H>  3.6   |  17.0<L>  |  <0.20<L>    Ca    7.4<L>      21 Aug 2022 04:34  Phos  2.5       Mg     2.1         TPro  5.8<L>  /  Alb  2.1<L>  /  TBili  24.7<H>  /  DBili  x   /  AST  119<H>  /  ALT  104<H>  /  AlkPhos  261<H>      PT/INR - ( 21 Aug 2022 04:34 )   PT: 36.9 sec;   INR: 3.14 ratio         PTT - ( 21 Aug 2022 00:36 )  PTT:59.3 sec  CARDIAC MARKERS ( 20 Aug 2022 13:50 )  x     / <0.01 ng/mL / x     / x     / x          Urinalysis Basic - ( 20 Aug 2022 14:25 )    Color: Gail / Appearance: Slightly Turbid / S.010 / pH: x  Gluc: x / Ketone: Negative  / Bili: Large / Urobili: 8   Blood: x / Protein: 30 mg/dL / Nitrite: Positive   Leuk Esterase: Trace / RBC: 3-5 /HPF / WBC 3-5 /HPF   Sq Epi: x / Non Sq Epi: Occasional / Bacteria: Few        CAPILLARY BLOOD GLUCOSE            RADIOLOGY & ADDITIONAL TESTS:  Results Reviewed: Y  Imaging Personally Reviewed: Y   Electrocardiogram Personally Reviewed: N    CT A/P non con:  Cirrhotic liver with large volume ascites. No gross liver mass.    Colonic thickening could represent colitis or portal colopathy.

## 2022-08-21 NOTE — CONSULT NOTE ADULT - PROBLEM SELECTOR RECOMMENDATION 9
Decompensated alcohol induced cirrhosis, (+ascites, +encephalopathy). MELD score 43. Maddrey discriminant factor 143.8.   Lactulose 10 mg BID  Will add Rifaximin  Furosemide 40 mg daily, Spirolactone 100 mg daily  CT abdomen/ pelvis  Trend LFTs, coagulopathy  Vitamin K x 3 doses ordered  Needs diagnostic and therapeutic paracentesis. Please sent fluid for Albumin, cell count, protein and culture and sensitivity.   MDF >32. Prednisolone therapy was initiated at [facility] with prior admission, was d/c after no response in L[place] score. Now with UTI. Needed to r/o SBP and sepsis. CXR clear with no evidence of pneumonia  Monitor renal function, electrolytes, replete electrolytes as needed

## 2022-08-22 ENCOUNTER — RESULT REVIEW (OUTPATIENT)
Age: 47
End: 2022-08-22

## 2022-08-22 NOTE — PROGRESS NOTE ADULT - ASSESSMENT
Decompensated cirrhosis with ascites. For LVP later today. Repeat labs ordered for the AM. Diet as tolerated. Decompensated cirrhosis with ascites. For LVP later today. Repeat labs ordered for the AM. Diet as tolerated. continue current Furosemide 40 mg./d.  and Spironolactone 100 mg./d. therapies.

## 2022-08-22 NOTE — PROGRESS NOTE ADULT - SUBJECTIVE AND OBJECTIVE BOX
Pt seen and examined. In NAD. For LVP today, No real GI complaints offered.     REVIEW OF SYSTEMS:  Constitutional: No fever, weight loss or fatigue  Cardiovascular: No chest pain, palpitations, dizziness or leg swelling  Gastrointestinal: As noted above. No abdominal or epigastric pain. No nausea, vomiting or hematemesis; No diarrhea or constipation. No melena or hematochezia.  Skin: No itching, burning, rashes or lesions       MEDICATIONS:  MEDICATIONS  (STANDING):  cefTRIAXone   IVPB 1000 milliGRAM(s) IV Intermittent every 24 hours  folic acid 1 milliGRAM(s) Oral daily  furosemide    Tablet 40 milliGRAM(s) Oral daily  heparin   Injectable 5000 Unit(s) SubCutaneous every 8 hours  lactulose Syrup 10 Gram(s) Oral two times a day  phytonadione  IVPB 10 milliGRAM(s) IV Intermittent daily  potassium chloride    Tablet ER 20 milliEquivalent(s) Oral daily  rifAXIMin 550 milliGRAM(s) Oral two times a day  spironolactone 100 milliGRAM(s) Oral daily    MEDICATIONS  (PRN):  aluminum hydroxide/magnesium hydroxide/simethicone Suspension 30 milliLiter(s) Oral every 4 hours PRN Dyspepsia  melatonin 3 milliGRAM(s) Oral at bedtime PRN Insomnia  ondansetron Injectable 4 milliGRAM(s) IV Push every 8 hours PRN Nausea and/or Vomiting      Allergies    No Known Allergies    Intolerances        Vital Signs Last 24 Hrs  T(C): 36.5 (22 Aug 2022 04:14), Max: 36.7 (21 Aug 2022 15:21)  T(F): 97.7 (22 Aug 2022 04:14), Max: 98.1 (21 Aug 2022 15:21)  HR: 71 (22 Aug 2022 04:14) (71 - 83)  BP: 119/74 (22 Aug 2022 04:14) (109/71 - 128/82)  BP(mean): --  RR: 19 (22 Aug 2022 04:14) (19 - 20)  SpO2: 99% (22 Aug 2022 04:14) (99% - 100%)    Parameters below as of 22 Aug 2022 04:14  Patient On (Oxygen Delivery Method): room air          PHYSICAL EXAM:    General: Well developed; icteric, in no acute distress  HEENT: MMM, conjunctiva pink and sclera icteric.  Lungs: clear to auscultation bilaterally.  Cor: RRR S1, S2 only.  Gastrointestinal: Abdomen: Softly distended with ascites. Normal bowel sounds; No hepatosplenomegaly  no significant tenderness,  Extremities: no cyanosis, clubbing or edema.  Skin: Warm and dry. No obvious rash  Neuro: Pt. a + o x 3, no tremulousness or asterixsis    LABS:  CBC Full  -  ( 22 Aug 2022 04:11 )  WBC Count : 14.86 K/uL  RBC Count : 2.88 M/uL  Hemoglobin : 10.5 g/dL  Hematocrit : 30.6 %  Platelet Count - Automated : 73 K/uL  Mean Cell Volume : 106.3 fl  Mean Cell Hemoglobin : 36.5 pg  Mean Cell Hemoglobin Concentration : 34.3 gm/dL  Auto Neutrophil # : x  Auto Lymphocyte # : x  Auto Monocyte # : x  Auto Eosinophil # : x  Auto Basophil # : x  Auto Neutrophil % : x  Auto Lymphocyte % : x  Auto Monocyte % : x  Auto Eosinophil % : x  Auto Basophil % : x        126<L>  |  96<L>  |  12.2  ----------------------------<  118<H>  3.5   |  19.0<L>  |  <0.20<L>    Ca    7.8<L>      22 Aug 2022 04:11  Phos  2.5       Mg     2.1         TPro  5.8<L>  /  Alb  2.1<L>  /  TBili  25.9<H>  /  DBili  x   /  AST  110<H>  /  ALT  101<H>  /  AlkPhos  281<H>      PT/INR - ( 22 Aug 2022 04:11 )   PT: 30.2 sec;   INR: 2.58 ratio         PTT - ( 21 Aug 2022 00:36 )  PTT:59.3 sec      Urinalysis Basic - ( 20 Aug 2022 14:25 )    Color: Gail / Appearance: Slightly Turbid / S.010 / pH: x  Gluc: x / Ketone: Negative  / Bili: Large / Urobili: 8   Blood: x / Protein: 30 mg/dL / Nitrite: Positive   Leuk Esterase: Trace / RBC: 3-5 /HPF / WBC 3-5 /HPF   Sq Epi: x / Non Sq Epi: Occasional / Bacteria: Few                RADIOLOGY & ADDITIONAL STUDIES (The following images were personally reviewed):

## 2022-08-22 NOTE — PROGRESS NOTE ADULT - ASSESSMENT
48 y/o M w/ a hx of Alcohol use disorder presenting for mgmt of alcoholic liver cirrhosis    Alcoholic liver cirrhosis w/ ascites  hx of alcoholic liver disease 2/2 etoh abuse  WBC 16 -> 14.9  INR improving  Anemia noted  Hep panel negative    plan:  ceftriaxone for SBP ppx  lactulose 15mL Q12  Rifaximin 550mg Q12  lasix 40mg Q24  Spironolactone 100mg Q24  IV Vitamin K  trend CMP  Abdominal US  trend coags  IR for paracentesis  GI recs appreciated      Malnutrition 2/2 alcohol abuse hx w/ Macrocytic Anemia, Electrolyte Abnormalities  Ascites noted  macrocytic anemia   on FA at home  Hypokalemia Improved  Hyponatremia     plan:  Anemia workup w/ Iron, Folate, B12  trend cbc  c/w FA 1mg Q24  c/w KCl 20mEQ Q24  Maintain Mg >2, Phos >4, K >4      Healthcare Maintenance  DVT PPx: HSQ  Diet: DASH  Dispo: Acutely Sick

## 2022-08-22 NOTE — PROGRESS NOTE ADULT - SUBJECTIVE AND OBJECTIVE BOX
[facility] Division of Hospital Medicine    Chief Complaint:  ETOH Cirrhosis    SUBJECTIVE / OVERNIGHT EVENTS:  No issues overnight, HD stable. Pt seen at bedside, noted minor improvement in abdominal pressure. Denies HA, CP,SOB, Pruritis, NVD, Fever, Lethargy. ROS neg other than mentioned.    MEDICATIONS  (STANDING):  cefTRIAXone   IVPB 1000 milliGRAM(s) IV Intermittent every 24 hours  folic acid 1 milliGRAM(s) Oral daily  furosemide    Tablet 40 milliGRAM(s) Oral daily  heparin   Injectable 5000 Unit(s) SubCutaneous every 8 hours  lactulose Syrup 10 Gram(s) Oral two times a day  phytonadione  IVPB 10 milliGRAM(s) IV Intermittent daily  potassium chloride    Tablet ER 20 milliEquivalent(s) Oral daily  rifAXIMin 550 milliGRAM(s) Oral two times a day  spironolactone 100 milliGRAM(s) Oral daily    MEDICATIONS  (PRN):  aluminum hydroxide/magnesium hydroxide/simethicone Suspension 30 milliLiter(s) Oral every 4 hours PRN Dyspepsia  melatonin 3 milliGRAM(s) Oral at bedtime PRN Insomnia  ondansetron Injectable 4 milliGRAM(s) IV Push every 8 hours PRN Nausea and/or Vomiting        I&O's Summary      PHYSICAL EXAM:  Vital Signs Last 24 Hrs  T(C): 36.6 (22 Aug 2022 08:43), Max: 36.7 (21 Aug 2022 15:21)  T(F): 97.8 (22 Aug 2022 08:43), Max: 98.1 (21 Aug 2022 15:21)  HR: 77 (22 Aug 2022 08:43) (71 - 83)  BP: 120/73 (22 Aug 2022 08:43) (109/71 - 128/72)  BP(mean): --  RR: 19 (22 Aug 2022 08:43) (19 - 20)  SpO2: 100% (22 Aug 2022 08:43) (99% - 100%)    Parameters below as of 22 Aug 2022 08:43  Patient On (Oxygen Delivery Method): room air          General: severe jaundice, in NAD  Eyes: PERRLA, EOMI; scleral icterus  Head: Normocephalic; atraumatic  ENMT: No nasal discharge; airway clear  Neck: Supple; non tender; no masses  Respiratory: No wheezes, rales or rhonchi  Cardiovascular: Regular rate and rhythm. S1 and S2 Normal; No murmurs, gallops or rubs  Gastrointestinal: distended, icteric, spider nevis and telangectasias noted, non tender, fluid wave present  Genitourinary: No costovertebral angle tenderness  Extremities: Normal range of motion, No clubbing or joint   Vascular: Peripheral pulses palpable 2+ bilaterally  Neurological: Alert and oriented x4  Skin: Warm and dry, jaundice  Lymph Nodes: No acute cervical adenopathy  Musculoskeletal: Normal gait, tone, without deformities  Psychiatric: Cooperative and appropriate    LABS:                        10.5   14.86 )-----------( 73       ( 22 Aug 2022 04:11 )             30.6     08-    126<L>  |  96<L>  |  12.2  ----------------------------<  118<H>  3.5   |  19.0<L>  |  <0.20<L>    Ca    7.8<L>      22 Aug 2022 04:11  Phos  2.5       Mg     2.1         TPro  5.8<L>  /  Alb  2.1<L>  /  TBili  25.9<H>  /  DBili  x   /  AST  110<H>  /  ALT  101<H>  /  AlkPhos  281<H>      PT/INR - ( 22 Aug 2022 04:11 )   PT: 30.2 sec;   INR: 2.58 ratio         PTT - ( 21 Aug 2022 00:36 )  PTT:59.3 sec  CARDIAC MARKERS ( 20 Aug 2022 13:50 )  x     / <0.01 ng/mL / x     / x     / x          Urinalysis Basic - ( 20 Aug 2022 14:25 )    Color: Gail / Appearance: Slightly Turbid / S.010 / pH: x  Gluc: x / Ketone: Negative  / Bili: Large / Urobili: 8   Blood: x / Protein: 30 mg/dL / Nitrite: Positive   Leuk Esterase: Trace / RBC: 3-5 /HPF / WBC 3-5 /HPF   Sq Epi: x / Non Sq Epi: Occasional / Bacteria: Few        CAPILLARY BLOOD GLUCOSE            RADIOLOGY & ADDITIONAL TESTS:  Results Reviewed: Y  Imaging Personally Reviewed: N  Electrocardiogram Personally Reviewed: ARNOLDO

## 2022-08-23 NOTE — DISCHARGE NOTE NURSING/CASE MANAGEMENT/SOCIAL WORK - NSDCPEFALRISK_GEN_ALL_CORE
For information on Fall & Injury Prevention, visit: [email or web address] OR  [email or web address] OR  https://www.cdc.gov/steadi/patient.html

## 2022-08-23 NOTE — PROGRESS NOTE ADULT - SUBJECTIVE AND OBJECTIVE BOX
Pt seen and examined. He feels much better, s/p LVP yesterday with removal of 3800 cc. of ascitic fluid. Fluid analysis negative for SBP. No c/o abdominal pain presently.    REVIEW OF SYSTEMS:  Constitutional: No fever, weight loss or fatigue  Cardiovascular: No chest pain, palpitations, dizziness or leg swelling  Gastrointestinal: As noted above. No abdominal or epigastric pain. No nausea, vomiting or hematemesis; No diarrhea or constipation. No melena or hematochezia.  Skin: No itching, burning, rashes or lesions       MEDICATIONS:  MEDICATIONS  (STANDING):  cefTRIAXone   IVPB 1000 milliGRAM(s) IV Intermittent every 24 hours  folic acid 1 milliGRAM(s) Oral daily  furosemide    Tablet 40 milliGRAM(s) Oral daily  heparin   Injectable 5000 Unit(s) SubCutaneous every 8 hours  lactulose Syrup 10 Gram(s) Oral two times a day  phytonadione  IVPB 10 milliGRAM(s) IV Intermittent daily  potassium chloride    Tablet ER 20 milliEquivalent(s) Oral daily  rifAXIMin 550 milliGRAM(s) Oral two times a day  spironolactone 100 milliGRAM(s) Oral daily    MEDICATIONS  (PRN):  aluminum hydroxide/magnesium hydroxide/simethicone Suspension 30 milliLiter(s) Oral every 4 hours PRN Dyspepsia  melatonin 3 milliGRAM(s) Oral at bedtime PRN Insomnia  ondansetron Injectable 4 milliGRAM(s) IV Push every 8 hours PRN Nausea and/or Vomiting      Allergies    No Known Allergies    Intolerances        Vital Signs Last 24 Hrs  T(C): 37 (23 Aug 2022 05:10), Max: 37 (23 Aug 2022 05:10)  T(F): 98.6 (23 Aug 2022 05:10), Max: 98.6 (23 Aug 2022 05:10)  HR: 78 (23 Aug 2022 05:10) (77 - 78)  BP: 108/66 (23 Aug 2022 05:10) (108/66 - 122/74)  BP(mean): --  RR: 18 (23 Aug 2022 05:10) (18 - 20)  SpO2: 99% (23 Aug 2022 05:10) (99% - 100%)    Parameters below as of 23 Aug 2022 05:10  Patient On (Oxygen Delivery Method): room air          PHYSICAL EXAM:    General: Well developed; icteric appearing, in no acute distress  HEENT: MMM, conjunctiva pink and sclera icteric.  Lungs: clear to auscultation bilaterally.  Cor: RRR S1, S2 only.  Gastrointestinal: Abdomen: Less distended w/o abdominal tenderness, Normal bowel sounds; No palpable hepatosplenomegaly  Extremities: no cyanosis, clubbing or edema.  Skin: Warm and dry. Icteric.  Neuro: Pt. a + o x 3, no tremulousness or asterixsis    LABS:      CBC Full  -  ( 23 Aug 2022 02:44 )  WBC Count : 13.20 K/uL  RBC Count : 2.78 M/uL  Hemoglobin : 10.0 g/dL  Hematocrit : 28.8 %  Platelet Count - Automated : 50 K/uL  Mean Cell Volume : 103.6 fl  Mean Cell Hemoglobin : 36.0 pg  Mean Cell Hemoglobin Concentration : 34.7 gm/dL  Auto Neutrophil # : 11.02 K/uL  Auto Lymphocyte # : 0.91 K/uL  Auto Monocyte # : 0.81 K/uL  Auto Eosinophil # : 0.12 K/uL  Auto Basophil # : 0.00 K/uL  Auto Neutrophil % : 78.3 %  Auto Lymphocyte % : 6.9 %  Auto Monocyte % : 6.1 %  Auto Eosinophil % : 0.9 %  Auto Basophil % : 0.0 %    08-23    127<L>  |  97<L>  |  12.7  ----------------------------<  122<H>  3.4<L>   |  20.0<L>  |  0.30<L>    Ca    7.5<L>      23 Aug 2022 02:44    TPro  5.4<L>  /  Alb  1.8<L>  /  TBili  24.2<H>  /  DBili  x   /  AST  104<H>  /  ALT  96<H>  /  AlkPhos  265<H>  08-23    PT/INR - ( 23 Aug 2022 02:44 )   PT: 29.6 sec;   INR: 2.53 ratio                           RADIOLOGY & ADDITIONAL STUDIES (The following images were personally reviewed):  · Procedure Name	Interventional Radiology  · Procedure Findings and Details	3800 cc clear yellow fluid  dermabond  labs and cyto sent  formal report to follow      Electronic Signatures:  Kamran Gandara)  (Signed 22-Aug-2022 18:33)  	Authored: PROCEDURE      Last Updated: 22-Aug-2022 18:33 by Kamran Gandara) Pt seen and examined. He feels much better, s/p LVP yesterday with removal of 3800 cc. of ascitic fluid. Fluid analysis negative for SBP. No c/o abdominal pain presently. Pt. with decompensated alcoholic cirrhosis with ascites.    REVIEW OF SYSTEMS:  Constitutional: No fever, weight loss or fatigue  Cardiovascular: No chest pain, palpitations, dizziness or leg swelling  Gastrointestinal: As noted above. No abdominal or epigastric pain. No nausea, vomiting or hematemesis; No diarrhea or constipation. No melena or hematochezia.  Skin: No itching, burning, rashes or lesions       MEDICATIONS:  MEDICATIONS  (STANDING):  cefTRIAXone   IVPB 1000 milliGRAM(s) IV Intermittent every 24 hours  folic acid 1 milliGRAM(s) Oral daily  furosemide    Tablet 40 milliGRAM(s) Oral daily  heparin   Injectable 5000 Unit(s) SubCutaneous every 8 hours  lactulose Syrup 10 Gram(s) Oral two times a day  phytonadione  IVPB 10 milliGRAM(s) IV Intermittent daily  potassium chloride    Tablet ER 20 milliEquivalent(s) Oral daily  rifAXIMin 550 milliGRAM(s) Oral two times a day  spironolactone 100 milliGRAM(s) Oral daily    MEDICATIONS  (PRN):  aluminum hydroxide/magnesium hydroxide/simethicone Suspension 30 milliLiter(s) Oral every 4 hours PRN Dyspepsia  melatonin 3 milliGRAM(s) Oral at bedtime PRN Insomnia  ondansetron Injectable 4 milliGRAM(s) IV Push every 8 hours PRN Nausea and/or Vomiting      Allergies    No Known Allergies    Intolerances        Vital Signs Last 24 Hrs  T(C): 37 (23 Aug 2022 05:10), Max: 37 (23 Aug 2022 05:10)  T(F): 98.6 (23 Aug 2022 05:10), Max: 98.6 (23 Aug 2022 05:10)  HR: 78 (23 Aug 2022 05:10) (77 - 78)  BP: 108/66 (23 Aug 2022 05:10) (108/66 - 122/74)  BP(mean): --  RR: 18 (23 Aug 2022 05:10) (18 - 20)  SpO2: 99% (23 Aug 2022 05:10) (99% - 100%)    Parameters below as of 23 Aug 2022 05:10  Patient On (Oxygen Delivery Method): room air          PHYSICAL EXAM:    General: Well developed; icteric appearing, in no acute distress  HEENT: MMM, conjunctiva pink and sclera icteric.  Lungs: clear to auscultation bilaterally.  Cor: RRR S1, S2 only.  Gastrointestinal: Abdomen: Less distended w/o abdominal tenderness, Normal bowel sounds; No palpable hepatosplenomegaly  Extremities: no cyanosis, clubbing or edema.  Skin: Warm and dry. Icteric.  Neuro: Pt. a + o x 3, no tremulousness or asterixsis    LABS:      CBC Full  -  ( 23 Aug 2022 02:44 )  WBC Count : 13.20 K/uL  RBC Count : 2.78 M/uL  Hemoglobin : 10.0 g/dL  Hematocrit : 28.8 %  Platelet Count - Automated : 50 K/uL  Mean Cell Volume : 103.6 fl  Mean Cell Hemoglobin : 36.0 pg  Mean Cell Hemoglobin Concentration : 34.7 gm/dL  Auto Neutrophil # : 11.02 K/uL  Auto Lymphocyte # : 0.91 K/uL  Auto Monocyte # : 0.81 K/uL  Auto Eosinophil # : 0.12 K/uL  Auto Basophil # : 0.00 K/uL  Auto Neutrophil % : 78.3 %  Auto Lymphocyte % : 6.9 %  Auto Monocyte % : 6.1 %  Auto Eosinophil % : 0.9 %  Auto Basophil % : 0.0 %    08-23    127<L>  |  97<L>  |  12.7  ----------------------------<  122<H>  3.4<L>   |  20.0<L>  |  0.30<L>    Ca    7.5<L>      23 Aug 2022 02:44    TPro  5.4<L>  /  Alb  1.8<L>  /  TBili  24.2<H>  /  DBili  x   /  AST  104<H>  /  ALT  96<H>  /  AlkPhos  265<H>  08-23    PT/INR - ( 23 Aug 2022 02:44 )   PT: 29.6 sec;   INR: 2.53 ratio                           RADIOLOGY & ADDITIONAL STUDIES (The following images were personally reviewed):  · Procedure Name	Interventional Radiology  · Procedure Findings and Details	3800 cc clear yellow fluid  dermabond  labs and cyto sent  formal report to follow      Electronic Signatures:  Kamran Gandara)  (Signed 22-Aug-2022 18:33)  	Authored: PROCEDURE      Last Updated: 22-Aug-2022 18:33 by Kamran Gandara)

## 2022-08-23 NOTE — PROGRESS NOTE ADULT - SUBJECTIVE AND OBJECTIVE BOX
TDOD SOC    [id number]    47y      Male    CC: etoh cirrhosis     INTERVAL HPI/OVERNIGHT EVENTS: pt seen and examined. s/p paracentesis 3800cc removed yesterday. reports improved distention, less abd pain. denies cp, ha, sob     REVIEW OF SYSTEMS:    CONSTITUTIONAL: No fever  RESPIRATORY: No cough, wheezing, hemoptysis; No shortness of breath  CARDIOVASCULAR: No chest pain, palpitations  GASTROINTESTINAL:  No nausea, vomiting  NEUROLOGICAL: No headaches    Vital Signs Last 24 Hrs  T(C): 36.4 (23 Aug 2022 15:23), Max: 37 (23 Aug 2022 05:10)  T(F): 97.5 (23 Aug 2022 15:23), Max: 98.6 (23 Aug 2022 05:10)  HR: 76 (23 Aug 2022 15:23) (69 - 78)  BP: 118/78 (23 Aug 2022 15:23) (100/62 - 122/74)  BP(mean): --  RR: 19 (23 Aug 2022 15:23) (18 - 20)  SpO2: 100% (23 Aug 2022 15:23) (99% - 100%)    Parameters below as of 23 Aug 2022 15:23  Patient On (Oxygen Delivery Method): room air        PHYSICAL EXAM:    GENERAL: NAD  HEENT: +EOMI; +scleral icterus   NECK: soft, supple  CHEST/LUNG: Clear to auscultation bilaterally  HEART: S1S2+, Regular rate and rhythm  ABDOMEN: Soft; Bowel sounds present; +distention, mildly tender with deep palpation   SKIN: warm, dry; +jaundice  NEURO: AAOX3, grossly non-focal  PSYCH: normal affect     LABS:                        10.0   13.20 )-----------( 50       ( 23 Aug 2022 02:44 )             28.8     08-23    127<L>  |  97<L>  |  12.7  ----------------------------<  122<H>  3.4<L>   |  20.0<L>  |  0.30<L>    Ca    7.5<L>      23 Aug 2022 02:44    TPro  5.4<L>  /  Alb  1.8<L>  /  TBili  24.2<H>  /  DBili  x   /  AST  104<H>  /  ALT  96<H>  /  AlkPhos  265<H>  08-23    PT/INR - ( 23 Aug 2022 02:44 )   PT: 29.6 sec;   INR: 2.53 ratio                 MEDICATIONS  (STANDING):  cefTRIAXone   IVPB 1000 milliGRAM(s) IV Intermittent every 24 hours  folic acid 1 milliGRAM(s) Oral daily  furosemide    Tablet 40 milliGRAM(s) Oral daily  heparin   Injectable 5000 Unit(s) SubCutaneous every 8 hours  lactulose Syrup 10 Gram(s) Oral two times a day  potassium chloride    Tablet ER 20 milliEquivalent(s) Oral daily  prednisoLONE    3 mG/mL Solution (ORAPRED) 40 milliGRAM(s) Oral daily  rifAXIMin 550 milliGRAM(s) Oral two times a day  spironolactone 100 milliGRAM(s) Oral daily    MEDICATIONS  (PRN):  aluminum hydroxide/magnesium hydroxide/simethicone Suspension 30 milliLiter(s) Oral every 4 hours PRN Dyspepsia  melatonin 3 milliGRAM(s) Oral at bedtime PRN Insomnia  ondansetron Injectable 4 milliGRAM(s) IV Push every 8 hours PRN Nausea and/or Vomiting      RADIOLOGY & ADDITIONAL TESTS:

## 2022-08-23 NOTE — PROGRESS NOTE ADULT - ASSESSMENT
Decompensated alcoholic cirrhosis with ascites s/p LVP yesterday with removal of 3800 cc. of ascitic fluid negative for SBP. Decompensated alcoholic cirrhosis with ascites s/p LVP yesterday with removal of 3800 cc. of ascitic fluid negative for SBP. MELD score > 40. [place]jerrell DF: > 32. No SBP on ascitic fluid analysis from yesterday. Will start Prednisolone 40 mg./d. today. Continue current Furosemide and Spironolactone therapies. Same DASH diet. Repeat labs ordered for the AM. ETOH abstinence urged.

## 2022-08-23 NOTE — DISCHARGE NOTE NURSING/CASE MANAGEMENT/SOCIAL WORK - NSDCFUADDAPPT_GEN_ALL_CORE_FT
Patient has been given a prescheduled appt at the [facility] for :  Friday 8/26/22 at 10 AM   Address: [address] [place] [address]   Phone # [phone]

## 2022-08-23 NOTE — DISCHARGE NOTE NURSING/CASE MANAGEMENT/SOCIAL WORK - PATIENT PORTAL LINK FT
You can access the FollowMyHealth Patient Portal offered by [facility] by registering at the following website: [email or web address]. By joining [employer]’s FollowMyHealth portal, you will also be able to view your health information using other applications (apps) compatible with our system.

## 2022-08-23 NOTE — PROGRESS NOTE ADULT - ASSESSMENT
47y/oM PMH EtOH abuse, cirrhosis admitted for decompensated alcoholic cirrhosis    Decompensated alcoholic cirrhosis w/ascites   EtOH abuse   -cont ceftriaxone for SBP ppx   -cont lactulose, rifaximin   -cont lasix   -cont spironolactone   -s/p vitamin k   -GI recs appreciated   -start prednisolone 40mg qd today 8/23  - 47y/oM PMH EtOH abuse, cirrhosis admitted for decompensated alcoholic cirrhosis    Decompensated alcoholic cirrhosis w/ascites   EtOH abuse   -cont ceftriaxone for SBP ppx   -cont lactulose, rifaximin   -cont lasix   -cont spironolactone   -s/p vitamin k   -GI recs appreciated   -start prednisolone 40mg qd today 8/23    Hypokalemia  -replete  -cont to monitor     Hyponatremia   -suspect chronic in setting of EtOH abuse  -cont to monitor     Macrocytic anemia   -labs reviewed   -no s/sx active bleeding     vte ppx: heparin sq

## 2022-08-24 DIAGNOSIS — E87.1 HYPO-OSMOLALITY AND HYPONATREMIA: ICD-10-CM

## 2022-08-24 DIAGNOSIS — R32 UNSPECIFIED URINARY INCONTINENCE: ICD-10-CM

## 2022-08-24 DIAGNOSIS — K70.31 ALCOHOLIC CIRRHOSIS OF LIVER WITH ASCITES: ICD-10-CM

## 2022-08-24 DIAGNOSIS — D53.9 NUTRITIONAL ANEMIA, UNSPECIFIED: ICD-10-CM

## 2022-08-24 DIAGNOSIS — K72.90 HEPATIC FAILURE, UNSPECIFIED WITHOUT COMA: ICD-10-CM

## 2022-08-24 DIAGNOSIS — F10.139 ALCOHOL ABUSE WITH WITHDRAWAL, UNSPECIFIED: ICD-10-CM

## 2022-08-24 DIAGNOSIS — K70.9 ALCOHOLIC LIVER DISEASE, UNSPECIFIED: ICD-10-CM

## 2022-08-24 DIAGNOSIS — I10 ESSENTIAL (PRIMARY) HYPERTENSION: ICD-10-CM

## 2022-08-24 DIAGNOSIS — Z20.822 CONTACT WITH AND (SUSPECTED) EXPOSURE TO COVID-19: ICD-10-CM

## 2022-08-24 DIAGNOSIS — E87.6 HYPOKALEMIA: ICD-10-CM

## 2022-08-24 DIAGNOSIS — R15.9 FULL INCONTINENCE OF FECES: ICD-10-CM

## 2022-08-24 DIAGNOSIS — D69.6 THROMBOCYTOPENIA, UNSPECIFIED: ICD-10-CM

## 2022-08-24 NOTE — PROGRESS NOTE ADULT - PROBLEM SELECTOR PLAN 1
Decompensated alcoholic cirrhosis with ascites, s/p paracentesis, 3800cc of ascitic fluid removed, - SBP  Continue Prednisolone 40mg daily  Continue Spirolactone, and Lasix, lactulose and Rifaximin.  Continue DASH diet.   Thiamine, MVI, Folic Acid Decompensated alcoholic cirrhosis with ascites, s/p paracentesis, 3800cc of ascitic fluid removed 8/22,  neg  SBP  Continue Prednisolone 40mg daily for ETOH hepatitis   Continue Spirolactone, and Lasix, lactulose and Rifaximin.  Continue DASH diet.   Thiamine, MVI, Folic Acid

## 2022-08-24 NOTE — PROGRESS NOTE ADULT - SUBJECTIVE AND OBJECTIVE BOX
Patient is a 47y old  Male who presents with a chief complaint of Cirrhosis 2/2 ETOH (23 Aug 2022 16:46)      INTERVAL HPI/OVERNIGHT EVENTS: Patient seen and evaluated at bedside, reporting abdominal distention stating "I am not passing much gas", tolerating oral intake, no SBP, MELD score >40, Maddrey DF >32, started on Prednisolone. Albumin 1.8. Denies nausea, vomiting, abdominal pain, chest pain, shortness of breath, hematemesis, hematochezia, melena.                                                      .      MEDICATIONS  (STANDING):  cefTRIAXone   IVPB 1000 milliGRAM(s) IV Intermittent every 24 hours  folic acid 1 milliGRAM(s) Oral daily  furosemide    Tablet 40 milliGRAM(s) Oral daily  heparin   Injectable 5000 Unit(s) SubCutaneous every 8 hours  lactulose Syrup 10 Gram(s) Oral two times a day  pantoprazole    Tablet 40 milliGRAM(s) Oral before breakfast  potassium chloride    Tablet ER 20 milliEquivalent(s) Oral daily  prednisoLONE    3 mG/mL Solution (ORAPRED) 40 milliGRAM(s) Oral daily  rifAXIMin 550 milliGRAM(s) Oral two times a day  spironolactone 100 milliGRAM(s) Oral daily    MEDICATIONS  (PRN):  aluminum hydroxide/magnesium hydroxide/simethicone Suspension 30 milliLiter(s) Oral every 4 hours PRN Dyspepsia  melatonin 3 milliGRAM(s) Oral at bedtime PRN Insomnia  ondansetron Injectable 4 milliGRAM(s) IV Push every 8 hours PRN Nausea and/or Vomiting      Allergies    No Known Allergies    Intolerances    Review of Systems:  · ENMT: negative  · Respiratory and Thorax: negative  · Cardiovascular: negative  · Gastrointestinal: see above.  · Genitourinary:	negative  · Musculoskeletal: negative  · Neurological: negative  · Psychiatric: negative  · Hematology/Lymphatics: negative  · Endocrine: negative        Vital Signs Last 24 Hrs  T(C): 36.6 (24 Aug 2022 09:58), Max: 36.8 (23 Aug 2022 11:39)  T(F): 97.9 (24 Aug 2022 09:58), Max: 98.3 (23 Aug 2022 11:39)  HR: 67 (24 Aug 2022 09:58) (66 - 76)  BP: 105/65 (24 Aug 2022 09:58) (100/62 - 123/77)  BP(mean): --  RR: 18 (24 Aug 2022 09:58) (18 - 19)  SpO2: 100% (24 Aug 2022 09:58) (98% - 100%)    Parameters below as of 24 Aug 2022 09:58  Patient On (Oxygen Delivery Method): room air    PHYSICAL EXAM:  · Constitutional:  man, sitting comfortably, in no acute distress.   · Eyes: EOMI; PERRL; no drainage or redness  · ENMT: No oral lesions; no gross abnormalities  · Neck:	No bruits; no thyromegaly or nodules  · Back:	No deformity or limitation of movement  · Respiratory: Breath Sounds equal & clear to percussion & auscultation, no accessory muscle use  · Cardiovascular: Regular rate & rhythm, normal S1, S2; no murmurs, gallops or rubs; no S3, S4  · Gastrointestinal: Soft, non-tender, distended no hepatosplenomegaly, normal bowel sounds      LABS:                        11.4   15.62 )-----------( 48       ( 24 Aug 2022 06:31 )             32.1     08-24    123<L>  |  93<L>  |  16.9  ----------------------------<  142<H>  4.2   |  19.0<L>  |  <0.20<L>    Ca    7.7<L>      24 Aug 2022 06:31  Mg     1.8     08-24    TPro  5.9<L>  /  Alb  2.0<L>  /  TBili  26.5<H>  /  DBili  x   /  AST  97<H>  /  ALT  97<H>  /  AlkPhos  301<H>  08-24    PT/INR - ( 24 Aug 2022 06:31 )   PT: 30.2 sec;   INR: 2.58 ratio             LIVER FUNCTIONS - ( 24 Aug 2022 06:31 )  Alb: 2.0 g/dL / Pro: 5.9 g/dL / ALK PHOS: 301 U/L / ALT: 97 U/L / AST: 97 U/L / GGT: x             RADIOLOGY & ADDITIONAL TESTS:  < from: IR US Guided Needle Placement (08.22.22 @ 18:27) >  ACC: [id number] EXAM:  SP PARACENTESIS ABD [facility]                        ACC: [id number] EXAM:  SP US GUID PLC NDL SI                          PROCEDURE DATE:  08/22/2022          INTERPRETATION:  History: Ascites. Abdominal distention. Jaundice.    : Dr. Gandara.    Sedation: None    Contrast: None    Anesthesia: 1% subcutaneous lidocaine.    Complication: None..    Procedure: The procedure, risks, benefits, and alternatives were   discussed with the patient in lamen's terms utilizing a Mexican    in the presence of the interventional radiology nurse, and   informed consent was placed in the chart.    The patient was placed in the supine  position on the stretcher  bed  and   a time out was performed.    The patient's right lowerquadrant was prepped and draped in a normal   sterile fashion. Under ultrasound guidance, a 6 Argentine multisidehole   catheter was advanced into the ascites. 3860 cc of straw  colored fluid   was aspirated.   Samples were sent for testing as requested.    The catheter was removed and Dermabond  was applied.    The patient  tolerated the procedure without complication and left the   suite in stable condition.    Findings:    Ultrasound demonstrates ascites in the right lower quadrant. Incidental   note is made of bowel wall thickening in the left lower quadrant. This   was noted on the prior CT scan.    IMPRESSION:    SUCCESSFUL DIAGNOSTIC AND LARGE VOLUME PARACENTESIS AS DESCRIBED.    --- End of Report ---    < end of copied text >

## 2022-08-24 NOTE — PROGRESS NOTE ADULT - ASSESSMENT
47y/oM PMH EtOH abuse, cirrhosis admitted for decompensated alcoholic cirrhosis    Decompensated alcoholic cirrhosis w/ascites   EtOH abuse   -cont ceftriaxone for SBP ppx   -cont lactulose, rifaximin   -cont lasix   -cont spironolactone   -s/p vitamin k   -GI recs appreciated   -start prednisolone 40mg qd today 8/23    Hypokalemia  Resolved  -cont to monitor     Hyponatremia   -suspect chronic in setting of EtOH abuse  -cont to monitor     Macrocytic anemia   -labs reviewed   -no s/sx active bleeding     Healthcare Maintenance  vte ppx: heparin sq   diet: regular  dispo: Acutely ill

## 2022-08-24 NOTE — PROGRESS NOTE ADULT - NS ATTEND AMEND GEN_ALL_CORE FT
Patient seen and examined with NP  No fevers  some discomfort at previous  paracentesis site  Check lytes , BUN creatine   Prednisalone for ETOH hepatitis

## 2022-08-24 NOTE — PROGRESS NOTE ADULT - SUBJECTIVE AND OBJECTIVE BOX
[facility] Division of Hospital Medicine    Chief Complaint:  etoh cirrhosis    SUBJECTIVE / OVERNIGHT EVENTS:  no overnight events. Pt complaining of   Patient denies chest pain, SOB, abd pain, N/V, fever, chills, dysuria or any other complaints. All remainder ROS negative.     MEDICATIONS  (STANDING):  cefTRIAXone   IVPB 1000 milliGRAM(s) IV Intermittent every 24 hours  folic acid 1 milliGRAM(s) Oral daily  furosemide    Tablet 40 milliGRAM(s) Oral daily  heparin   Injectable 5000 Unit(s) SubCutaneous every 8 hours  lactulose Syrup 10 Gram(s) Oral two times a day  lidocaine   4% Patch 1 Patch Transdermal every 24 hours  pantoprazole    Tablet 40 milliGRAM(s) Oral before breakfast  potassium chloride    Tablet ER 20 milliEquivalent(s) Oral daily  prednisoLONE    3 mG/mL Solution (ORAPRED) 40 milliGRAM(s) Oral daily  rifAXIMin 550 milliGRAM(s) Oral two times a day  spironolactone 100 milliGRAM(s) Oral daily    MEDICATIONS  (PRN):  aluminum hydroxide/magnesium hydroxide/simethicone Suspension 30 milliLiter(s) Oral every 4 hours PRN Dyspepsia  melatonin 3 milliGRAM(s) Oral at bedtime PRN Insomnia  ondansetron Injectable 4 milliGRAM(s) IV Push every 8 hours PRN Nausea and/or Vomiting        I&O's Summary      PHYSICAL EXAM:  Vital Signs Last 24 Hrs  T(C): 36.6 (24 Aug 2022 09:58), Max: 36.6 (24 Aug 2022 05:07)  T(F): 97.9 (24 Aug 2022 09:58), Max: 97.9 (24 Aug 2022 09:58)  HR: 67 (24 Aug 2022 09:58) (66 - 76)  BP: 105/65 (24 Aug 2022 09:58) (105/65 - 123/77)  BP(mean): --  RR: 18 (24 Aug 2022 09:58) (18 - 19)  SpO2: 100% (24 Aug 2022 09:58) (98% - 100%)    Parameters below as of 24 Aug 2022 09:58  Patient On (Oxygen Delivery Method): room air            CONSTITUTIONAL: NAD, well-developed, well-groomed  ENMT: Moist oral mucosa, no pharyngeal injection or exudates; normal dentition  RESPIRATORY: Normal respiratory effort; lungs are clear to auscultation bilaterally  CARDIOVASCULAR: Regular rate and rhythm, normal S1 and S2, no murmur/rub/gallop; No lower extremity edema; Peripheral pulses are 2+ bilaterally  ABDOMEN: Nontender to palpation, normoactive bowel sounds, no rebound/guarding; No hepatosplenomegaly  MUSCLOSKELETAL:  Normal gait; no clubbing or cyanosis of digits; no joint swelling or tenderness to palpation  PSYCH: A+O to person, place, and time; affect appropriate  NEUROLOGY: CN 2-12 are intact and symmetric; no gross sensory deficits;   SKIN: No rashes; no palpable lesions    LABS:                        11.4   15.62 )-----------( 48       ( 24 Aug 2022 06:31 )             32.1     08-24    123<L>  |  93<L>  |  16.9  ----------------------------<  142<H>  4.2   |  19.0<L>  |  <0.20<L>    Ca    7.7<L>      24 Aug 2022 06:31  Mg     1.8     08-24    TPro  5.9<L>  /  Alb  2.0<L>  /  TBili  26.5<H>  /  DBili  x   /  AST  97<H>  /  ALT  97<H>  /  AlkPhos  301<H>  08-24    PT/INR - ( 24 Aug 2022 06:31 )   PT: 30.2 sec;   INR: 2.58 ratio                   Culture - Fungal, Body Fluid (collected 22 Aug 2022 18:39)  Source: Peritoneal Peritoneal Fluid  Preliminary Report (24 Aug 2022 06:35):    Testing in progress    Culture - Body Fluid with Gram Stain (collected 22 Aug 2022 18:39)  Source: Peritoneal Peritoneal Fluid  Gram Stain (23 Aug 2022 03:35):    No polymorphonuclear leukocytes seen    No organisms seen    by cytocentrifuge  Preliminary Report (24 Aug 2022 08:06):    No growth      CAPILLARY BLOOD GLUCOSE            RADIOLOGY & ADDITIONAL TESTS:  Results Reviewed:   Imaging Personally Reviewed:  Electrocardiogram Personally Reviewed:                                           [facility] Division of Hospital Medicine    Chief Complaint:  etoh cirrhosis    SUBJECTIVE / OVERNIGHT EVENTS:  no overnight events. Pt complaining of   Patient denies chest pain, SOB, abd pain, N/V, fever, chills, dysuria or any other complaints. All remainder ROS negative.     MEDICATIONS  (STANDING):  cefTRIAXone   IVPB 1000 milliGRAM(s) IV Intermittent every 24 hours  folic acid 1 milliGRAM(s) Oral daily  furosemide    Tablet 40 milliGRAM(s) Oral daily  heparin   Injectable 5000 Unit(s) SubCutaneous every 8 hours  lactulose Syrup 10 Gram(s) Oral two times a day  lidocaine   4% Patch 1 Patch Transdermal every 24 hours  pantoprazole    Tablet 40 milliGRAM(s) Oral before breakfast  potassium chloride    Tablet ER 20 milliEquivalent(s) Oral daily  prednisoLONE    3 mG/mL Solution (ORAPRED) 40 milliGRAM(s) Oral daily  rifAXIMin 550 milliGRAM(s) Oral two times a day  spironolactone 100 milliGRAM(s) Oral daily    MEDICATIONS  (PRN):  aluminum hydroxide/magnesium hydroxide/simethicone Suspension 30 milliLiter(s) Oral every 4 hours PRN Dyspepsia  melatonin 3 milliGRAM(s) Oral at bedtime PRN Insomnia  ondansetron Injectable 4 milliGRAM(s) IV Push every 8 hours PRN Nausea and/or Vomiting        I&O's Summary      PHYSICAL EXAM:  Vital Signs Last 24 Hrs  T(C): 36.6 (24 Aug 2022 09:58), Max: 36.6 (24 Aug 2022 05:07)  T(F): 97.9 (24 Aug 2022 09:58), Max: 97.9 (24 Aug 2022 09:58)  HR: 67 (24 Aug 2022 09:58) (66 - 76)  BP: 105/65 (24 Aug 2022 09:58) (105/65 - 123/77)  BP(mean): --  RR: 18 (24 Aug 2022 09:58) (18 - 19)  SpO2: 100% (24 Aug 2022 09:58) (98% - 100%)    Parameters below as of 24 Aug 2022 09:58  Patient On (Oxygen Delivery Method): room air          GENERAL: NAD  HEENT: +EOMI; +scleral icterus   NECK: soft, supple  CHEST/LUNG: Clear to auscultation bilaterally  HEART: S1S2+, Regular rate and rhythm  ABDOMEN: Soft; Bowel sounds present; +distention, mildly tender with deep palpation   SKIN: warm, dry; +jaundice  NEURO: AAOX3, grossly non-focal  PSYCH: normal affect     LABS:                        11.4   15.62 )-----------( 48       ( 24 Aug 2022 06:31 )             32.1     08-24    123<L>  |  93<L>  |  16.9  ----------------------------<  142<H>  4.2   |  19.0<L>  |  <0.20<L>    Ca    7.7<L>      24 Aug 2022 06:31  Mg     1.8     08-24    TPro  5.9<L>  /  Alb  2.0<L>  /  TBili  26.5<H>  /  DBili  x   /  AST  97<H>  /  ALT  97<H>  /  AlkPhos  301<H>  08-24    PT/INR - ( 24 Aug 2022 06:31 )   PT: 30.2 sec;   INR: 2.58 ratio                   Culture - Fungal, Body Fluid (collected 22 Aug 2022 18:39)  Source: Peritoneal Peritoneal Fluid  Preliminary Report (24 Aug 2022 06:35):    Testing in progress    Culture - Body Fluid with Gram Stain (collected 22 Aug 2022 18:39)  Source: Peritoneal Peritoneal Fluid  Gram Stain (23 Aug 2022 03:35):    No polymorphonuclear leukocytes seen    No organisms seen    by cytocentrifuge  Preliminary Report (24 Aug 2022 08:06):    No growth      CAPILLARY BLOOD GLUCOSE            RADIOLOGY & ADDITIONAL TESTS:  Results Reviewed: Y  Imaging Personally Reviewed: N  Electrocardiogram Personally Reviewed: N

## 2022-08-25 PROBLEM — Z00.00 ENCOUNTER FOR PREVENTIVE HEALTH EXAMINATION: Status: ACTIVE | Noted: 2022-08-25

## 2022-08-25 NOTE — PROGRESS NOTE ADULT - PROBLEM SELECTOR PLAN 1
Decompensated alcoholic cirrhosis with ascites, with persistent lateral abdominal discomfort.  Worsening leukocytosis, and bili uptrend, on Day #3 Prednisolone 40mg daily.  Continue Spirolactone, and Lasix, lactulose and Rifaximin.  Continue DASH diet.   Thiamine, MVI, Folic Acid. Decompensated alcoholic cirrhosis with ascites, with persistent lateral abdominal discomfort.  Worsening leukocytosis, and bili uptrend, on Day #3 Prednisolone 40mg daily.  Continue Spirolactone, and Lasix, lactulose and Rifaximin.  Continue DASH diet.   Thiamine, MVI, Folic Acid.  If BIlirubin no better after 7 day steroids, will D/C at that time

## 2022-08-25 NOTE — PROGRESS NOTE ADULT - ASSESSMENT
47y/oM PMH EtOH abuse, cirrhosis admitted for decompensated alcoholic cirrhosis    Decompensated alcoholic cirrhosis w/ascites   EtOH abuse   WBC increasing likely 2/2 steroids vs infectious process, Afebrile  -cont ceftriaxone for SBP ppx  -cont lactulose, rifaximin   -DC lasix and spironolactone in the setting of severe hyponatremia  -s/p vitamin k   -GI recs appreciated   -Steroids for one more day  -Obtain blood cultures today, UA and Ucx tomorrow      Hyponatremia   worsening  -suspect chronic in setting of EtOH abuse and diuretics use  -DC diuretics, monitor chemistry Q8  -PO fluid restriction 1L daily    Macrocytic anemia   -labs reviewed   -no s/sx active bleeding     Healthcare Maintenance  vte ppx: heparin sq   diet: regular  dispo: Acutely ill

## 2022-08-25 NOTE — PROGRESS NOTE ADULT - SUBJECTIVE AND OBJECTIVE BOX
[facility] Division of Hospital Medicine    Chief Complaint:  etoh cirrhosis    SUBJECTIVE / OVERNIGHT EVENTS:  Worsening hyponatremia noted. Pt notes improvement in gas like bloating pain.  Patient denies chest pain, SOB, N/V, fever, chills, dysuria or any other complaints. All remainder ROS negative.     MEDICATIONS  (STANDING):  cefTRIAXone   IVPB 1000 milliGRAM(s) IV Intermittent every 24 hours  folic acid 1 milliGRAM(s) Oral daily  furosemide    Tablet 40 milliGRAM(s) Oral daily  heparin   Injectable 5000 Unit(s) SubCutaneous every 8 hours  lactulose Syrup 10 Gram(s) Oral four times a day  lidocaine   4% Patch 1 Patch Transdermal every 24 hours  pantoprazole    Tablet 40 milliGRAM(s) Oral before breakfast  potassium chloride    Tablet ER 20 milliEquivalent(s) Oral daily  prednisoLONE    3 mG/mL Solution (ORAPRED) 40 milliGRAM(s) Oral daily  rifAXIMin 550 milliGRAM(s) Oral two times a day  simethicone 80 milliGRAM(s) Chew daily  thiamine 100 milliGRAM(s) Oral daily    MEDICATIONS  (PRN):  aluminum hydroxide/magnesium hydroxide/simethicone Suspension 30 milliLiter(s) Oral every 4 hours PRN Dyspepsia  melatonin 3 milliGRAM(s) Oral at bedtime PRN Insomnia  ondansetron Injectable 4 milliGRAM(s) IV Push every 8 hours PRN Nausea and/or Vomiting        I&O's Summary    24 Aug 2022 07:01  -  25 Aug 2022 07:00  --------------------------------------------------------  IN: 720 mL / OUT: 800 mL / NET: -80 mL        PHYSICAL EXAM:  Vital Signs Last 24 Hrs  T(C): 36.3 (25 Aug 2022 11:07), Max: 36.9 (24 Aug 2022 15:56)  T(F): 97.4 (25 Aug 2022 11:07), Max: 98.4 (24 Aug 2022 15:56)  HR: 68 (25 Aug 2022 11:07) (68 - 75)  BP: 122/74 (25 Aug 2022 11:07) (115/69 - 122/74)  BP(mean): --  RR: 18 (25 Aug 2022 11:07) (18 - 19)  SpO2: 100% (25 Aug 2022 11:07) (100% - 100%)    Parameters below as of 25 Aug 2022 11:07  Patient On (Oxygen Delivery Method): room air          GENERAL: NAD  HEENT: +EOMI; +scleral icterus   NECK: soft, supple  CHEST/LUNG: Clear to auscultation bilaterally  HEART: S1S2+, Regular rate and rhythm  ABDOMEN: Soft; Bowel sounds present; +distention, mildly tender with deep palpation   SKIN: warm, dry; +jaundice  NEURO: AAOX3, grossly non-focal  PSYCH: normal affect     LABS:                        11.3   17.44 )-----------( 58       ( 25 Aug 2022 06:45 )             30.7     08-25    122<L>  |  92<L>  |  18.3  ----------------------------<  124<H>  3.9   |  21.0<L>  |  <0.20<L>    Ca    7.4<L>      25 Aug 2022 06:45  Mg     1.8     08-24    TPro  5.9<L>  /  Alb  2.0<L>  /  TBili  26.5<H>  /  DBili  x   /  AST  97<H>  /  ALT  97<H>  /  AlkPhos  301<H>  08-24    PT/INR - ( 24 Aug 2022 06:31 )   PT: 30.2 sec;   INR: 2.58 ratio                   Culture - Fungal, Body Fluid (collected 22 Aug 2022 18:39)  Source: Peritoneal Peritoneal Fluid  Preliminary Report (24 Aug 2022 06:35):    Testing in progress    Culture - Body Fluid with Gram Stain (collected 22 Aug 2022 18:39)  Source: Peritoneal Peritoneal Fluid  Gram Stain (23 Aug 2022 03:35):    No polymorphonuclear leukocytes seen    No organisms seen    by cytocentrifuge  Preliminary Report (24 Aug 2022 08:06):    No growth      CAPILLARY BLOOD GLUCOSE            RADIOLOGY & ADDITIONAL TESTS:  Results Reviewed: Y  Imaging Personally Reviewed: N  Electrocardiogram Personally Reviewed: N

## 2022-08-25 NOTE — PROGRESS NOTE ADULT - NS ATTEND AMEND GEN_ALL_CORE FT
I reviewed the labs, notes Ct images  Patient with continued generalized abdominal discomfort  ETOH hepatitis  on prednisalone

## 2022-08-25 NOTE — PROGRESS NOTE ADULT - SUBJECTIVE AND OBJECTIVE BOX
Patient is a 47y old  Male who presents with a chief complaint of Cirrhosis 2/2 ETOH (24 Aug 2022 13:18)      INTERVAL HPI/OVERNIGHT EVENTS: Patient seen and evaluated at bedside, Jaundice, reporting persistent lateral abdominal discomfort, worsening leukocytosis, bili uptrend now 26.5, Day #3 of Prednisolone. Denies nausea, vomiting,  chest pain, shortness of breath, hematemesis, hematochezia, melena.      MEDICATIONS  (STANDING):  cefTRIAXone   IVPB 1000 milliGRAM(s) IV Intermittent every 24 hours  folic acid 1 milliGRAM(s) Oral daily  furosemide    Tablet 40 milliGRAM(s) Oral daily  heparin   Injectable 5000 Unit(s) SubCutaneous every 8 hours  lactulose Syrup 10 Gram(s) Oral four times a day  lidocaine   4% Patch 1 Patch Transdermal every 24 hours  pantoprazole    Tablet 40 milliGRAM(s) Oral before breakfast  potassium chloride    Tablet ER 20 milliEquivalent(s) Oral daily  prednisoLONE    3 mG/mL Solution (ORAPRED) 40 milliGRAM(s) Oral daily  rifAXIMin 550 milliGRAM(s) Oral two times a day  simethicone 80 milliGRAM(s) Chew daily  spironolactone 100 milliGRAM(s) Oral daily    MEDICATIONS  (PRN):  aluminum hydroxide/magnesium hydroxide/simethicone Suspension 30 milliLiter(s) Oral every 4 hours PRN Dyspepsia  melatonin 3 milliGRAM(s) Oral at bedtime PRN Insomnia  ondansetron Injectable 4 milliGRAM(s) IV Push every 8 hours PRN Nausea and/or Vomiting      Allergies    No Known Allergies    Intolerances    Review of Systems:  · ENMT: negative  · Respiratory and Thorax: negative  · Cardiovascular: negative  · Gastrointestinal: see above.  · Genitourinary:	negative  · Musculoskeletal: negative  · Neurological: negative  · Psychiatric: negative  · Hematology/Lymphatics: negative  · Endocrine: negative      Vital Signs Last 24 Hrs  T(C): 36.3 (25 Aug 2022 11:07), Max: 36.9 (24 Aug 2022 15:56)  T(F): 97.4 (25 Aug 2022 11:07), Max: 98.4 (24 Aug 2022 15:56)  HR: 68 (25 Aug 2022 11:07) (68 - 75)  BP: 122/74 (25 Aug 2022 11:07) (115/69 - 122/74)  BP(mean): --  RR: 18 (25 Aug 2022 11:07) (18 - 19)  SpO2: 100% (25 Aug 2022 11:07) (100% - 100%)    Parameters below as of 25 Aug 2022 11:07  Patient On (Oxygen Delivery Method): room air        PHYSICAL EXAM:      LABS:                        11.3   17.44 )-----------( 58       ( 25 Aug 2022 06:45 )             30.7     08-25    122<L>  |  92<L>  |  18.3  ----------------------------<  124<H>  3.9   |  21.0<L>  |  <0.20<L>    Ca    7.4<L>      25 Aug 2022 06:45  Mg     1.8     08-24    TPro  5.9<L>  /  Alb  2.0<L>  /  TBili  26.5<H>  /  DBili  x   /  AST  97<H>  /  ALT  97<H>  /  AlkPhos  301<H>  08-24    PT/INR - ( 24 Aug 2022 06:31 )   PT: 30.2 sec;   INR: 2.58 ratio             LIVER FUNCTIONS - ( 24 Aug 2022 06:31 )  Alb: 2.0 g/dL / Pro: 5.9 g/dL / ALK PHOS: 301 U/L / ALT: 97 U/L / AST: 97 U/L / GGT: x

## 2022-08-26 NOTE — DIETITIAN INITIAL EVALUATION ADULT - PERTINENT MEDS FT
MEDICATIONS  (STANDING):  cefTRIAXone   IVPB 1000 milliGRAM(s) IV Intermittent every 24 hours  folic acid 1 milliGRAM(s) Oral daily  heparin   Injectable 5000 Unit(s) SubCutaneous every 8 hours  lactulose Syrup 10 Gram(s) Oral four times a day  lidocaine   4% Patch 1 Patch Transdermal every 24 hours  pantoprazole    Tablet 40 milliGRAM(s) Oral before breakfast  phytonadione   Solution 5 milliGRAM(s) Oral daily  potassium chloride    Tablet ER 20 milliEquivalent(s) Oral daily  prednisoLONE    3 mG/mL Solution (ORAPRED) 40 milliGRAM(s) Oral daily  rifAXIMin 550 milliGRAM(s) Oral two times a day  simethicone 80 milliGRAM(s) Chew daily  sodium chloride 1 Gram(s) Oral two times a day  thiamine 100 milliGRAM(s) Oral daily    MEDICATIONS  (PRN):  aluminum hydroxide/magnesium hydroxide/simethicone Suspension 30 milliLiter(s) Oral every 4 hours PRN Dyspepsia  melatonin 3 milliGRAM(s) Oral at bedtime PRN Insomnia  ondansetron Injectable 4 milliGRAM(s) IV Push every 8 hours PRN Nausea and/or Vomiting

## 2022-08-26 NOTE — DIETITIAN INITIAL EVALUATION ADULT - ADD RECOMMEND
Continue MVI, thiamine, and folic acid supplementation. Monitor electrolytes and replete as needed. Encourage po intake, monitor diet tolerance, and provide assistance at meals as needed. Obtain daily weights to monitor trends.

## 2022-08-26 NOTE — PROGRESS NOTE ADULT - ASSESSMENT
47y/oM PMH EtOH abuse, cirrhosis admitted for decompensated alcoholic cirrhosis    Decompensated alcoholic cirrhosis w/ascites   EtOH abuse   WBC increasing likely 2/2 steroids vs infectious process, Afebrile  -cont ceftriaxone for SBP ppx  -cont lactulose, rifaximin   -s/p vitamin k   -GI recs appreciated   -DC Steroids tomorrow  -Follow up cultures for possible infectious causes      Hyponatremia   worsening  -suspect chronic in setting of EtOH abuse and diuretics use  -Stat albumin 100mg once  -PO fluid restriction 1L daily    Macrocytic anemia   -labs reviewed   -no s/sx active bleeding     Healthcare Maintenance  vte ppx: heparin sq   diet: regular  dispo: Acutely ill

## 2022-08-26 NOTE — PROGRESS NOTE ADULT - SUBJECTIVE AND OBJECTIVE BOX
[facility] Division of Hospital Medicine    Chief Complaint:  etoh cirrhosis    SUBJECTIVE / OVERNIGHT EVENTS:  Worsening hyponatremia noted. Seen at bedside, HD Stable, in NAD, mild bloating and burping but no pain noted in abd. Patient denies chest pain, SOB, N/V, fever, chills, dysuria or any other complaints. All remainder ROS negative.       MEDICATIONS  (STANDING):  albumin human 25% IVPB 100 milliLiter(s) IV Intermittent once  cefTRIAXone   IVPB 1000 milliGRAM(s) IV Intermittent every 24 hours  folic acid 1 milliGRAM(s) Oral daily  heparin   Injectable 5000 Unit(s) SubCutaneous every 8 hours  lactulose Syrup 10 Gram(s) Oral four times a day  lidocaine   4% Patch 1 Patch Transdermal every 24 hours  pantoprazole    Tablet 40 milliGRAM(s) Oral before breakfast  potassium chloride    Tablet ER 20 milliEquivalent(s) Oral daily  prednisoLONE    3 mG/mL Solution (ORAPRED) 40 milliGRAM(s) Oral daily  rifAXIMin 550 milliGRAM(s) Oral two times a day  simethicone 80 milliGRAM(s) Chew daily  thiamine 100 milliGRAM(s) Oral daily    MEDICATIONS  (PRN):  aluminum hydroxide/magnesium hydroxide/simethicone Suspension 30 milliLiter(s) Oral every 4 hours PRN Dyspepsia  melatonin 3 milliGRAM(s) Oral at bedtime PRN Insomnia  ondansetron Injectable 4 milliGRAM(s) IV Push every 8 hours PRN Nausea and/or Vomiting        I&O's Summary      PHYSICAL EXAM:  Vital Signs Last 24 Hrs  T(C): 36.8 (26 Aug 2022 09:28), Max: 36.9 (26 Aug 2022 00:05)  T(F): 98.2 (26 Aug 2022 09:28), Max: 98.4 (26 Aug 2022 00:05)  HR: 74 (26 Aug 2022 09:28) (68 - 77)  BP: 100/64 (26 Aug 2022 09:28) (98/55 - 122/74)  BP(mean): --  RR: 18 (26 Aug 2022 09:28) (18 - 18)  SpO2: 96% (26 Aug 2022 09:28) (96% - 100%)    Parameters below as of 26 Aug 2022 09:28  Patient On (Oxygen Delivery Method): room air          GENERAL: NAD  HEENT: +EOMI; +scleral icterus   NECK: soft, supple  CHEST/LUNG: Clear to auscultation bilaterally  HEART: S1S2+, Regular rate and rhythm  ABDOMEN: Soft; Bowel sounds present; +distention, mildly tender with deep palpation   SKIN: warm, dry; +jaundice  NEURO: AAOX3, grossly non-focal  PSYCH: normal affect       LABS:                        11.8   16.47 )-----------( 57       ( 26 Aug 2022 07:35 )             32.2     08-26    123<L>  |  90<L>  |  24.3<H>  ----------------------------<  161<H>  4.2   |  22.0  |  <0.20<L>    Ca    7.4<L>      26 Aug 2022 07:35      PT/INR - ( 26 Aug 2022 07:35 )   PT: 28.0 sec;   INR: 2.39 ratio             CAPILLARY BLOOD GLUCOSE        RADIOLOGY & ADDITIONAL TESTS:  Results Reviewed: Y  Imaging Personally Reviewed: N  Electrocardiogram Personally Reviewed: N

## 2022-08-26 NOTE — DIETITIAN INITIAL EVALUATION ADULT - PERTINENT LABORATORY DATA
08-26    123<L>  |  90<L>  |  24.3<H>  ----------------------------<  161<H>  4.2   |  22.0  |  <0.20<L>    Ca    7.4<L>      26 Aug 2022 07:35

## 2022-08-26 NOTE — PROGRESS NOTE ADULT - PROBLEM SELECTOR PLAN 1
ETOH hepatitis   hyponatremia, elevated  Bili and alk phos  order MRCP   elevated INR- will give vitamin K   If no improvement of LFT after 7 days prednisalone, then will D/C steroids. Now on day 4  continue xifaxan and lactulose  prognosis poor

## 2022-08-26 NOTE — PROGRESS NOTE ADULT - SUBJECTIVE AND OBJECTIVE BOX
Patient is a 47y old  Male who presents with a chief complaint of Chronic liver failure without hepatic coma     (26 Aug 2022 14:04)      HPI:  46 y/o M w/ a hx of ?Deep Vein Thrombosis (not on AC as per pt), Hypertension, Alcohol use disorder, liver failure transferred to [facility] from [facility] for liver cirrhosis. Pt has an extensive hx of etoh abuse, last drink 3 weeks prior.  Patient has nausea , no abdominal pain. NO fevers.  Bilirubin not done today . WBC same. INR elevated           FHx: Father - DM; Mother non contributory  PSHx: None  SHx: Never smoker; last ETOH 3 weeks ago; No recreational drugs      REVIEW OF SYSTEMS:  Constitutional: No fever, weight loss or fatigue  ENMT:  No difficulty hearing, tinnitus, vertigo; No sinus or throat pain  Respiratory: No cough, wheezing, chills or hemoptysis  Cardiovascular: No chest pain, palpitations, dizziness or leg swelling  Gastrointestinal: No abdominal or epigastric pain. No nausea, vomiting or hematemesis; No diarrhea or constipation. No melena or hematochezia.  Skin: No itching, burning, rashes or lesions   Musculoskeletal: No joint pain or swelling; No muscle, back or extremity pain    PAST MEDICAL & SURGICAL HISTORY:  Cirrhosis          FAMILY HISTORY:      SOCIAL HISTORY:  Smoking Status: [ ] Current, [ ] Former, [ ] Never  Pack Years:  [ X ] EtOH  [  ] IVDA    MEDICATIONS:  MEDICATIONS  (STANDING):  cefTRIAXone   IVPB 1000 milliGRAM(s) IV Intermittent every 24 hours  folic acid 1 milliGRAM(s) Oral daily  heparin   Injectable 5000 Unit(s) SubCutaneous every 8 hours  lactulose Syrup 10 Gram(s) Oral four times a day  lidocaine   4% Patch 1 Patch Transdermal every 24 hours  pantoprazole    Tablet 40 milliGRAM(s) Oral before breakfast  phytonadione   Solution 5 milliGRAM(s) Oral daily  potassium chloride    Tablet ER 20 milliEquivalent(s) Oral daily  prednisoLONE    3 mG/mL Solution (ORAPRED) 40 milliGRAM(s) Oral daily  rifAXIMin 550 milliGRAM(s) Oral two times a day  simethicone 80 milliGRAM(s) Chew daily  sodium chloride 1 Gram(s) Oral two times a day  thiamine 100 milliGRAM(s) Oral daily    MEDICATIONS  (PRN):  aluminum hydroxide/magnesium hydroxide/simethicone Suspension 30 milliLiter(s) Oral every 4 hours PRN Dyspepsia  melatonin 3 milliGRAM(s) Oral at bedtime PRN Insomnia  ondansetron Injectable 4 milliGRAM(s) IV Push every 8 hours PRN Nausea and/or Vomiting      Allergies    No Known Allergies    Intolerances        Vital Signs Last 24 Hrs  T(C): 36.8 (26 Aug 2022 09:28), Max: 36.9 (26 Aug 2022 00:05)  T(F): 98.2 (26 Aug 2022 09:28), Max: 98.4 (26 Aug 2022 00:05)  HR: 74 (26 Aug 2022 09:28) (69 - 77)  BP: 100/64 (26 Aug 2022 09:28) (98/55 - 121/72)  BP(mean): --  RR: 18 (26 Aug 2022 09:28) (18 - 18)  SpO2: 96% (26 Aug 2022 09:28) (96% - 99%)    Parameters below as of 26 Aug 2022 09:28  Patient On (Oxygen Delivery Method): room air            PHYSICAL EXAM:    General:  juandice   HEENT: MMM, conjunctiva and scleral ictuerus   H-RRR  L-CTA  Gastrointestinal: Soft, non-tender +-distended; Normal bowel sounds; No rebound or guarding  Extremities: Normal range of motion, No clubbing, cyanosis or edema  Neurological: Alert and oriented x3  Skin: Warm and dry. No obvious rash      LABS:                        11.8   16.47 )-----------( 57       ( 26 Aug 2022 07:35 )             32.2     26 Aug 2022 07:35    123    |  90     |  24.3   ----------------------------<  161    4.2     |  22.0   |  <0.20    Ca    7.4        26 Aug 2022 07:35                RADIOLOGY & ADDITIONAL STUDIES:     < from: US Abdomen Limited (08.25.22 @ 13:04) >  IMPRESSION: Ascites as discussed      < end of copied text >

## 2022-08-26 NOTE — DIETITIAN INITIAL EVALUATION ADULT - DIET TYPE
Consider liberalize diet and discontinue protein restriction; fluid restriction per MD discretion. Consider discontinue protein restriction; fluid restriction per MD discretion./DASH/TLC (sodium and cholesterol restricted diet)

## 2022-08-26 NOTE — PROGRESS NOTE ADULT - TIME BILLING
I reviewed the labs,  notes,
I reviewed labs, notes, meds
I reviewed the labs, notes and plan discussed with NP

## 2022-08-26 NOTE — DIETITIAN NUTRITION RISK NOTIFICATION - TREATMENT: THE FOLLOWING DIET HAS BEEN RECOMMENDED
Diet, Regular:   60 gm Protein (PRO60G)  1000mL Fluid Restriction ([id number]) (08-25-22 @ 14:14) [Active]

## 2022-08-26 NOTE — DIETITIAN NUTRITION RISK NOTIFICATION - ADDITIONAL COMMENTS/DIETITIAN RECOMMENDATIONS
Consider liberalize diet and discontinue protein restriction; fluid restriction per MD discretion.  Add Ensure Enlive BID to maximize nutrition status and provide an additional 350 kcal, 20g protein per serving.  Continue MVI, thiamine, and folic acid supplementation. Monitor electrolytes and replete as needed. Encourage po intake, monitor diet tolerance, and provide assistance at meals as needed. Obtain daily weights to monitor trends.   Change diet to DASH/TLC; Consider discontinue protein restriction; fluid restriction per MD discretion.  Add Ensure Enlive BID to maximize nutrition status and provide an additional 350 kcal, 20g protein per serving.  Continue MVI, thiamine, and folic acid supplementation. Monitor electrolytes and replete as needed. Encourage po intake, monitor diet tolerance, and provide assistance at meals as needed. Obtain daily weights to monitor trends.

## 2022-08-26 NOTE — DIETITIAN INITIAL EVALUATION ADULT - ORAL NUTRITION SUPPLEMENTS
Add Ensure Enlive BID to maximize nutrition status and provide an additional 350 kcal, 20g protein per serving.

## 2022-08-26 NOTE — DIETITIAN INITIAL EVALUATION ADULT - ETIOLOGY
related to inability to meet sufficient protein-energy in setting of decompensated alcoholic cirrhosis w/ascites

## 2022-08-26 NOTE — DIETITIAN INITIAL EVALUATION ADULT - OTHER INFO
Santana ID #[id number] used to assist in interview. Pt reports overall good appetite/po intake PTA and currently. States he typically consumes three meals/day which usually consists of Mongolian foods, pancakes, chicken, soups, banana. Pt confirms his UBW ~2 years ago was 175 lbs and confirms significant weight loss since becoming sick. Pt aware of his current diet order for 60g protein restriction and 1000 ml fluid restriction- educated briefly. RD to follow up.

## 2022-08-27 NOTE — PROGRESS NOTE ADULT - SUBJECTIVE AND OBJECTIVE BOX
Patient is a 47y old  Male who presents with a chief complaint of Cirrhosis 2/2 ETOH (27 Aug 2022 09:37)      HPI:  48 y/o M w/ a hx of ?Deep Vein Thrombosis (not on AC as per pt), Hypertension, Alcohol use disorder, liver failure transferred to [facility] from [facility] for liver cirrhosis. Pt has an extensive hx of etoh abuse, last drink 3 weeks prior. Recently discharged from [facility] on 8/15 for treatment of alcoholic liver disease. States that over the past two days he has noticed bloating in the abdomen, denying any CP, SOB, Abd pain, paresthesias, fevers, fatigue. Transferred for further GI workup. Seen at bedside, alert and cooperative. Endorses compliance w/ medications and alcohol cessation. GI is following for alcoholic hepatitis and decompensated cirrhosis. He is being treated with PO steroid for alcoholic hepatitis. Leukocytosis reactive due to steroid therapy. He is otherwise afebrile. On eval believes need to be fasting for some blood test?. I encourage PO diet. Denies symptoms of abdominal pain, nausea, vomiting or GI bleeding. He is on day 5/7 PO steroid therapy.             REVIEW OF SYSTEMS:  Constitutional: No fever, weight loss or fatigue  ENMT:  No difficulty hearing, tinnitus, vertigo; No sinus or throat pain  Respiratory: No cough, wheezing, chills or hemoptysis  Cardiovascular: No chest pain, palpitations, dizziness or leg swelling  Gastrointestinal: No abdominal or epigastric pain. No nausea, vomiting or hematemesis; No diarrhea or constipation. No melena or hematochezia.  Skin: No itching, burning, rashes or lesions   Musculoskeletal: No joint pain or swelling; No muscle, back or extremity pain    PAST MEDICAL & SURGICAL HISTORY:  Cirrhosis          FAMILY HISTORY:      SOCIAL HISTORY:  Smoking Status: [ ] Current, [ ] Former, [ ] Never  Pack Years:  [  ] EtOH  [  ] IVDA    MEDICATIONS:  MEDICATIONS  (STANDING):  folic acid 1 milliGRAM(s) Oral daily  heparin   Injectable 5000 Unit(s) SubCutaneous every 8 hours  lactulose Syrup 10 Gram(s) Oral four times a day  lidocaine   4% Patch 1 Patch Transdermal every 24 hours  pantoprazole    Tablet 40 milliGRAM(s) Oral before breakfast  phytonadione   Solution 5 milliGRAM(s) Oral daily  potassium chloride    Tablet ER 20 milliEquivalent(s) Oral daily  prednisoLONE    3 mG/mL Solution (ORAPRED) 40 milliGRAM(s) Oral daily  rifAXIMin 550 milliGRAM(s) Oral two times a day  simethicone 80 milliGRAM(s) Chew daily  sodium chloride 1 Gram(s) Oral two times a day  thiamine 100 milliGRAM(s) Oral daily    MEDICATIONS  (PRN):  aluminum hydroxide/magnesium hydroxide/simethicone Suspension 30 milliLiter(s) Oral every 4 hours PRN Dyspepsia  melatonin 3 milliGRAM(s) Oral at bedtime PRN Insomnia  ondansetron Injectable 4 milliGRAM(s) IV Push every 8 hours PRN Nausea and/or Vomiting      Allergies    No Known Allergies    Intolerances        Vital Signs Last 24 Hrs  T(C): 36.7 (27 Aug 2022 10:19), Max: 36.8 (26 Aug 2022 20:56)  T(F): 98.1 (27 Aug 2022 10:19), Max: 98.3 (26 Aug 2022 20:56)  HR: 62 (27 Aug 2022 10:19) (62 - 76)  BP: 108/64 (27 Aug 2022 10:19) (108/64 - 133/76)  BP(mean): --  RR: 14 (27 Aug 2022 10:19) (14 - 18)  SpO2: 100% (27 Aug 2022 10:19) (99% - 100%)    Parameters below as of 27 Aug 2022 10:19  Patient On (Oxygen Delivery Method): room air            PHYSICAL EXAM:    General: ; in no acute distress  HEENT: Icterus+ve   H-RRR  L-CTA  Gastrointestinal: Soft, non-tender non-distended; Normal bowel sounds; No rebound or guarding  Extremities: Normal range of motion, No clubbing, cyanosis or edema  Neurological: Alert and oriented x3  Skin: Warm and dry. No obvious rash      LABS:                        11.2   16.71 )-----------( 51       ( 27 Aug 2022 05:00 )             31.2     27 Aug 2022 05:00    127    |  95     |  24.0   ----------------------------<  107    4.0     |  22.0   |  <0.20    Ca    8.1        27 Aug 2022 05:00    TPro  5.6    /  Alb  2.2    /  TBili  29.6   /  DBili  x      /  AST  98     /  ALT  90     /  AlkPhos  259    / Amylase x      /Lipase x      27 Aug 2022 05:00              RADIOLOGY & ADDITIONAL STUDIES:

## 2022-08-27 NOTE — PROGRESS NOTE ADULT - SUBJECTIVE AND OBJECTIVE BOX
HPI  Pt is a 48yo M admitted to [facility] for decompensated cirhosis and ETOH withdrawl   Pt was seen and examined at bedside. No overnight complaints. Pt is on day 5 of steroid. Denies of any abd pain. Pt has diarrhea.     Vital Signs Last 24 Hrs  T(C): 36.8 (27 Aug 2022 04:46), Max: 36.8 (26 Aug 2022 20:56)  T(F): 98.2 (27 Aug 2022 04:46), Max: 98.3 (26 Aug 2022 20:56)  HR: 71 (27 Aug 2022 04:46) (68 - 73)  BP: 133/76 (27 Aug 2022 04:46) (122/69 - 133/76)  BP(mean): --  RR: 18 (27 Aug 2022 04:46) (18 - 18)  SpO2: 100% (27 Aug 2022 04:46) (99% - 100%)    Parameters below as of 27 Aug 2022 04:46  Patient On (Oxygen Delivery Method): room air        I&O's Summary      CAPILLARY BLOOD GLUCOSE          PHYSICAL EXAM:    Constitutional: NAD,   HEENT: bilateral icterus   Neck: Soft and supple   Respiratory: Breath sounds are clear bilaterally,   Cardiovascular: S1 and S2,    Gastrointestinal: Bowel Sounds present, soft, nontender,  distended, no guarding, no rebound  Extremities: No peripheral edema  Vascular: 2+ peripheral pulses  Neurological: A/O x 3, no focal deficits  Musculoskeletal: 5/5 strength b/l upper and lower extremities  Skin: No rashes    MEDICATIONS:  MEDICATIONS  (STANDING):  folic acid 1 milliGRAM(s) Oral daily  heparin   Injectable 5000 Unit(s) SubCutaneous every 8 hours  lactulose Syrup 10 Gram(s) Oral four times a day  lidocaine   4% Patch 1 Patch Transdermal every 24 hours  pantoprazole    Tablet 40 milliGRAM(s) Oral before breakfast  phytonadione   Solution 5 milliGRAM(s) Oral daily  potassium chloride    Tablet ER 20 milliEquivalent(s) Oral daily  prednisoLONE    3 mG/mL Solution (ORAPRED) 40 milliGRAM(s) Oral daily  rifAXIMin 550 milliGRAM(s) Oral two times a day  simethicone 80 milliGRAM(s) Chew daily  sodium chloride 1 Gram(s) Oral two times a day  thiamine 100 milliGRAM(s) Oral daily      LABS: All Labs Reviewed:                        11.2   16.71 )-----------( 51       ( 27 Aug 2022 05:00 )             31.2     08-27    127<L>  |  95<L>  |  24.0<H>  ----------------------------<  107<H>  4.0   |  22.0  |  <0.20<L>    Ca    8.1<L>      27 Aug 2022 05:00    TPro  5.6<L>  /  Alb  2.2<L>  /  TBili  29.6<H>  /  DBili  x   /  AST  98<H>  /  ALT  90<H>  /  AlkPhos  259<H>  08-27    PT/INR - ( 26 Aug 2022 07:35 )   PT: 28.0 sec;   INR: 2.39 ratio               Blood Culture: 08-26 @ 00:59  Organism --  Gram Stain Blood -- Gram Stain --  Specimen Source .Blood Blood-Peripheral  Culture-Blood --    08-26 @ 00:55  Organism --  Gram Stain Blood -- Gram Stain --  Specimen Source .Blood Blood-Peripheral  Culture-Blood --    08-22 @ 18:39  Organism --  Gram Stain Blood -- Gram Stain   No polymorphonuclear leukocytes seen  No organisms seen  by cytocentrifuge  Specimen Source Peritoneal Peritoneal Fluid  Culture-Blood --        RADIOLOGY/EKG:    DVT PPX:    ADVANCED DIRECTIVE:    DISPOSITION:

## 2022-08-27 NOTE — PROGRESS NOTE ADULT - ASSESSMENT
47y/oM PMH EtOH abuse, cirrhosis admitted for decompensated alcoholic cirrhosis    *Decompensated alcoholic cirrhosis w/ascites with EtOH abuse   WBC increasing likely 2/2 steroids vs infectious process, Afebrile  cont ceftriaxone for SBP ppx  cont lactulose, rifaximin   s/p vitamin k   GI recs appreciated   steroid day 5/7 per GI   Follow up cultures for possible infectious causes  MRCP done yesterday, pending result     *Hyponatremia   improving   suspect chronic in setting of EtOH abuse and diuretics use  s/p albumin 100mg once  PO fluid restriction 1L daily    *Macrocytic anemia   labs reviewed   no s/sx active bleeding     Healthcare Maintenance  vte ppx: heparin sq   diet: regular  dispo: Acutely ill

## 2022-08-28 NOTE — PROGRESS NOTE ADULT - SUBJECTIVE AND OBJECTIVE BOX
Patient is a 47y old  Male who presents with a chief complaint of Cirrhosis 2/2 ETOH (27 Aug 2022 14:08)      HPI:  48 y/o M with hx of ?Deep Vein Thrombosis (not on AC as per pt), Hypertension, Alcohol use disorder, liver failure transferred to [facility] from [facility] for liver cirrhosis. Pt has an extensive hx of etoh abuse, last drink 3 weeks prior. Recently discharged [place] [facility] [place]15 for treatment of alcoholic liver disease. States that over the past two days he has noticed bloating in the abdomen, denying any CP, SOB, Abd pain, paresthesias, fevers, fatigue. Transferred for further GI workup. GI is following for alcoholic hepatitis and decompensated cirrhosis. He is being treated with PO steroid for alcoholic hepatitis. Leukocytosis reactive due to steroid therapy. He is otherwise afebrile.  On questioning he know his name/. Does not know location/date/year. Denies symptoms of abdominal pain, nausea, vomiting or GI bleeding. He is on day 6/7 PO steroid therapy.       REVIEW OF SYSTEMS:  Constitutional: No fever, weight loss or fatigue  ENMT:  No difficulty hearing, tinnitus, vertigo; No sinus or throat pain  Respiratory: No cough, wheezing, chills or hemoptysis  Cardiovascular: No chest pain, palpitations, dizziness or leg swelling  Gastrointestinal: No abdominal or epigastric pain. No nausea, vomiting or hematemesis; No diarrhea or constipation. No melena or hematochezia.  Skin: No itching, burning, rashes or lesions   Musculoskeletal: No joint pain or swelling; No muscle, back or extremity pain    PAST MEDICAL & SURGICAL HISTORY:  Cirrhosis          FAMILY HISTORY:      SOCIAL HISTORY:  Smoking Status: [ ] Current, [ ] Former, [ ] Never  Pack Years:  [  ] EtOH  [  ] IVDA    MEDICATIONS:  MEDICATIONS  (STANDING):  ciprofloxacin     Tablet 500 milliGRAM(s) Oral daily  folic acid 1 milliGRAM(s) Oral daily  heparin   Injectable 5000 Unit(s) SubCutaneous every 8 hours  lactulose Syrup 10 Gram(s) Oral four times a day  lidocaine   4% Patch 1 Patch Transdermal every 24 hours  pantoprazole    Tablet 40 milliGRAM(s) Oral before breakfast  phytonadione   Solution 5 milliGRAM(s) Oral daily  potassium chloride    Tablet ER 20 milliEquivalent(s) Oral daily  prednisoLONE    3 mG/mL Solution (ORAPRED) 40 milliGRAM(s) Oral daily  rifAXIMin 550 milliGRAM(s) Oral two times a day  simethicone 80 milliGRAM(s) Chew daily  sodium chloride 1 Gram(s) Oral two times a day  thiamine 100 milliGRAM(s) Oral daily    MEDICATIONS  (PRN):  aluminum hydroxide/magnesium hydroxide/simethicone Suspension 30 milliLiter(s) Oral every 4 hours PRN Dyspepsia  melatonin 3 milliGRAM(s) Oral at bedtime PRN Insomnia  ondansetron Injectable 4 milliGRAM(s) IV Push every 8 hours PRN Nausea and/or Vomiting      Allergies    No Known Allergies    Intolerances        Vital Signs Last 24 Hrs  T(C): 36.3 (28 Aug 2022 10:14), Max: 36.8 (28 Aug 2022 04:37)  T(F): 97.4 (28 Aug 2022 10:14), Max: 98.3 (28 Aug 2022 04:37)  HR: 77 (28 Aug 2022 10:14) (72 - 79)  BP: 108/72 (28 Aug 2022 10:14) (108/69 - 117/71)  BP(mean): --  RR: 18 (28 Aug 2022 10:14) (17 - 18)  SpO2: 98% (28 Aug 2022 10:14) (97% - 99%)    Parameters below as of 28 Aug 2022 10:14  Patient On (Oxygen Delivery Method): room air            PHYSICAL EXAM:    General: Icterus   HEENT: MMM, conjunctiva and sclera clear  H-RRR  L-CTA  Gastrointestinal: Soft, non-tender, distended; Normal bowel sounds; No rebound or guarding  Extremities: Normal range of motion, No clubbing, cyanosis or edema  Neurological: Alert and oriented x3  Skin: Warm and dry. No obvious rash      LABS:                        11.4   16.90 )-----------( 53       ( 28 Aug 2022 06:18 )             31.7     28 Aug 2022 06:18    129    |  99     |  24.4   ----------------------------<  144    3.9     |  21.0   |  <0.20    Ca    7.9        28 Aug 2022 06:18    TPro  5.5    /  Alb  2.2    /  TBili  31.1   /  DBili  x      /  AST  91     /  ALT  90     /  AlkPhos  272    / Amylase x      /Lipase x      28 Aug 2022 06:18              RADIOLOGY & ADDITIONAL STUDIES:

## 2022-08-28 NOTE — PROGRESS NOTE ADULT - CRITICAL CARE ATTENDING COMMENT
47M with decompensated cirrhosis and possible component of underling hepatitis p/w jaundice and abdominal bloating. s/p paracentesis and no SBP. MRCP showed no evidence of choledocholithiasis.  Leukocytosis is likely related steroid therapy. He is on rifaximin and lactulose. Cirrhotic appearing liver on imaging. He has very low total protein in body fluid which will put him at high risk for SBP. Overall, AOx 2. No focal deficit. tolerating PO diet. Per hospitalist, patient had BMs yesterday. Denies any complaint.       Recommendation:   Therapeutic paracentesis   Follow serum ammonia level  May consider CT head to rule out IC pathology    Continue PO steroid and check Lille score tomorrow, which will be 7th day on the steroid therapy if >0.45 would d/c steroid.   Off diuretics due to hyponatremia   Encourage PO diet  Continue Etoh abstinence   Rifaximin and lactulose to have 2-2 soft BM  HCC surveillance q 6 months  EGD/Colon as OP   Po ciprofloxacin 500mg daily indefinitely for primary prophylaxis against SBP due to low total BF protein.   Prognosis remain poor.   GI will follow.

## 2022-08-28 NOTE — PROGRESS NOTE ADULT - SUBJECTIVE AND OBJECTIVE BOX
HPI  Pt is a 46yo M admitted to [facility] for decompensated cirhosis and ETOH withdrawl   Pt was seen and examined at bedside with pacific  Tra ID [id number]. No overnight complaints. Pt is on day 6 of steroid. Denies of any abd pain. Pt has diarrhea.     Vital Signs Last 24 Hrs  T(C): 36.3 (28 Aug 2022 10:14), Max: 36.8 (28 Aug 2022 04:37)  T(F): 97.4 (28 Aug 2022 10:14), Max: 98.3 (28 Aug 2022 04:37)  HR: 77 (28 Aug 2022 10:14) (72 - 79)  BP: 108/72 (28 Aug 2022 10:14) (108/69 - 117/71)  BP(mean): --  RR: 18 (28 Aug 2022 10:14) (17 - 18)  SpO2: 98% (28 Aug 2022 10:14) (97% - 99%)    Parameters below as of 28 Aug 2022 10:14  Patient On (Oxygen Delivery Method): room air        I&O's Summary      CAPILLARY BLOOD GLUCOSE          PHYSICAL EXAM:    Constitutional: NAD,   HEENT: bilateral icterus   Neck: Soft and supple   Respiratory: Breath sounds are clear bilaterally,   Cardiovascular: S1 and S2,    Gastrointestinal: Bowel Sounds present, soft, nontender,  Marked distension, no guarding, no rebound  Extremities: No peripheral edema  Vascular: 2+ peripheral pulses  Neurological: A/O x 3, no focal deficits  Musculoskeletal: 5/5 strength b/l upper and lower extremities  Skin: No rashes    MEDICATIONS:  MEDICATIONS  (STANDING):  ciprofloxacin     Tablet 500 milliGRAM(s) Oral daily  folic acid 1 milliGRAM(s) Oral daily  heparin   Injectable 5000 Unit(s) SubCutaneous every 8 hours  lactulose Syrup 10 Gram(s) Oral four times a day  lidocaine   4% Patch 1 Patch Transdermal every 24 hours  pantoprazole    Tablet 40 milliGRAM(s) Oral before breakfast  phytonadione   Solution 5 milliGRAM(s) Oral daily  potassium chloride    Tablet ER 20 milliEquivalent(s) Oral daily  prednisoLONE    3 mG/mL Solution (ORAPRED) 40 milliGRAM(s) Oral daily  rifAXIMin 550 milliGRAM(s) Oral two times a day  simethicone 80 milliGRAM(s) Chew daily  sodium chloride 1 Gram(s) Oral two times a day  thiamine 100 milliGRAM(s) Oral daily      LABS: All Labs Reviewed:                        11.4   16.90 )-----------( 53       ( 28 Aug 2022 06:18 )             31.7     08-28    129<L>  |  99  |  24.4<H>  ----------------------------<  144<H>  3.9   |  21.0<L>  |  <0.20<L>    Ca    7.9<L>      28 Aug 2022 06:18    TPro  5.5<L>  /  Alb  2.2<L>  /  TBili  31.1<H>  /  DBili  x   /  AST  91<H>  /  ALT  90<H>  /  AlkPhos  272<H>  08-28    PT/INR - ( 28 Aug 2022 06:18 )   PT: 27.3 sec;   INR: 2.33 ratio         PTT - ( 28 Aug 2022 06:18 )  PTT:98.2 sec      Blood Culture: 08-26 @ 00:59  Organism --  Gram Stain Blood -- Gram Stain --  Specimen Source .Blood Blood-Peripheral  Culture-Blood --    08-26 @ 00:55  Organism --  Gram Stain Blood -- Gram Stain --  Specimen Source .Blood Blood-Peripheral  Culture-Blood --        RADIOLOGY/EKG:    DVT PPX:    ADVANCED DIRECTIVE:    DISPOSITION:

## 2022-08-28 NOTE — PROGRESS NOTE ADULT - ASSESSMENT
47y/oM PMH EtOH abuse, cirrhosis admitted for decompensated alcoholic cirrhosis    *Decompensated alcoholic cirrhosis w/ascites with EtOH abuse   WBC increasing likely 2/2 steroids vs infectious process, Afebrile  cont cipro 500mg qd indefinitely for ppx for SBP due to low total BF protein   cont lactulose, rifaximin   s/p vitamin k   GI recs appreciated   steroid day 6/7 per GI   Will check Lille score tomorrow   Follow up cultures for possible infectious causes  MRCP done showed cirrhosis with portal HTN and large ascites  IR consult for repeat therapeutic paracentesis tomorrow (order is in, please call in the am)    *Hyponatremia   improving   suspect chronic in setting of EtOH abuse and diuretics use  s/p albumin 100mg once  PO fluid restriction 1L daily    *Macrocytic anemia   labs reviewed   no s/sx active bleeding     vte ppx: heparin sq   diet: regular  dispo: Acutely ill, need paracentesis tomorrow  47y/oM PMH EtOH abuse, cirrhosis admitted for decompensated alcoholic cirrhosis    *Decompensated alcoholic cirrhosis w/ascites with EtOH abuse   WBC increasing likely 2/2 steroids vs infectious process, Afebrile  cont cipro 500mg qd indefinitely for ppx for SBP due to low total BF protein   cont lactulose, rifaximin   s/p vitamin k   GI recs appreciated   steroid day 6/7 per GI   Will check Lille score tomorrow   Follow up cultures for possible infectious causes  MRCP done showed cirrhosis with portal HTN and large ascites  Please call IR consult for repeat therapeutic paracentesis tomorrow in the am     *Hyponatremia   improving   suspect chronic in setting of EtOH abuse and diuretics use  s/p albumin 100mg once  PO fluid restriction 1L daily    *Macrocytic anemia   labs reviewed   no s/sx active bleeding     vte ppx: heparin sq   diet: regular  dispo: Acutely ill, need paracentesis tomorrow, please call in the am for IR

## 2022-08-29 NOTE — PROGRESS NOTE ADULT - NS ATTEND AMEND GEN_ALL_CORE FT
I evaluated this pt. with my NP and agree with the above assessment and management plan. Prednisolone D/C'ed because of high [place] score after 7 days of Rx. Will need repeat paracentesis prior to discharge. Overall prognosis is extremely poor. Repeat labs ordered for the AM. Continue to hold diuretics because of hyponatremia.

## 2022-08-29 NOTE — PROGRESS NOTE ADULT - ASSESSMENT
47M with decompensated cirrhosis and possible component of underling hepatitis p/w jaundice and abdominal bloating. s/p paracentesis and no SBP. MRCP showed no evidence of choledocholithiasis.   esau sanchez

## 2022-08-29 NOTE — PROGRESS NOTE ADULT - SUBJECTIVE AND OBJECTIVE BOX
Chief Complaint: This is a 47y old man patient being seen in follow-up consultation for Cirrhosis.    Interval HPI / 24H events:  Patient seen and evaluated at bedside, reporting no complaints, no overnight events. Patient denies abdominal pain, nausea, vomiting,     ROS: A 14-point review of systems was reviewed and was otherwise negative save what was reported in the HPI.    PAST MEDICAL/SURGICAL HISTORY:  Cirrhosis      MEDICATIONS  (STANDING):  ciprofloxacin     Tablet 500 milliGRAM(s) Oral daily  folic acid 1 milliGRAM(s) Oral daily  heparin   Injectable 5000 Unit(s) SubCutaneous every 8 hours  lactulose Syrup 10 Gram(s) Oral four times a day  lidocaine   4% Patch 1 Patch Transdermal every 24 hours  pantoprazole    Tablet 40 milliGRAM(s) Oral before breakfast  potassium chloride    Tablet ER 20 milliEquivalent(s) Oral daily  rifAXIMin 550 milliGRAM(s) Oral two times a day  simethicone 80 milliGRAM(s) Chew daily  sodium chloride 1 Gram(s) Oral two times a day  thiamine 100 milliGRAM(s) Oral daily    MEDICATIONS  (PRN):  aluminum hydroxide/magnesium hydroxide/simethicone Suspension 30 milliLiter(s) Oral every 4 hours PRN Dyspepsia  melatonin 3 milliGRAM(s) Oral at bedtime PRN Insomnia  ondansetron Injectable 4 milliGRAM(s) IV Push every 8 hours PRN Nausea and/or Vomiting    No Known Allergies    T(C): 36.7 (08-29-22 @ 08:32), Max: 36.8 (08-28-22 @ 21:55)  HR: 80 (08-29-22 @ 08:32) (74 - 80)  BP: 112/70 (08-29-22 @ 08:32) (112/64 - 120/74)  RR: 18 (08-29-22 @ 08:32) (16 - 18)  SpO2: 99% (08-29-22 @ 08:32) (98% - 100%)        PHYSICAL EXAM:    Constitutional: No acute distress  Neuro: Awake alert, oriented to person, place and situation, non-focal, speech clear and intact  HEENT: PERRL, icteric sclerae  Neck: supple, no JVD  CV: regular rate, regular rhythm, +S1S2,   Pulm/chest: lung sounds clear bilaterally, no accessory muscle use noted  Abd: +ascites, obese, soft, NT, ND, +BS  Ext: no Cyanosis, clubbing or edema  Skin: warm, well perfused, + jaundice   Psych: calm, appropriate affect        LABS:               11.2   18.80 )-----------( 61       ( 08-29 @ 06:18 )             32.4                11.4   16.90 )-----------( 53       ( 08-28 @ 06:18 )             31.7                11.2   16.71 )-----------( 51       ( 08-27 @ 05:00 )             31.2       08-29    130<L>  |  99  |  31.9<H>  ----------------------------<  133<H>  4.4   |  19.0<L>  |  0.50    Ca    7.8<L>      29 Aug 2022 06:18    TPro  5.6<L>  /  Alb  2.2<L>  /  TBili  30.7<H>  /  DBili  x   /  AST  96<H>  /  ALT  97<H>  /  AlkPhos  291<H>  08-29    LIVER FUNCTIONS - ( 29 Aug 2022 06:18 )  Alb: 2.2 g/dL / Pro: 5.6 g/dL / ALK PHOS: 291 U/L / ALT: 97 U/L / AST: 96 U/L / GGT: x               PT/INR - ( 29 Aug 2022 06:18 )   PT: 26.7 sec;   INR: 2.28 ratio         PTT - ( 29 Aug 2022 06:18 )  PTT:100.6 sec

## 2022-08-29 NOTE — PROGRESS NOTE ADULT - SUBJECTIVE AND OBJECTIVE BOX
cc: liver failure , epistaxis       interval hx: patient seen and eval.  episode of epistaxis this after noon. pressure applied , now better . otherwise comfortable. denies sob , abd pain , n/v     Vital Signs Last 24 Hrs  T(C): 36.6 (29 Aug 2022 15:38), Max: 36.8 (28 Aug 2022 21:55)  T(F): 97.9 (29 Aug 2022 15:38), Max: 98.2 (28 Aug 2022 21:55)  HR: 77 (29 Aug 2022 15:38) (74 - 80)  BP: 117/74 (29 Aug 2022 15:38) (112/64 - 120/74)  BP(mean): --  RR: 18 (29 Aug 2022 15:38) (16 - 18)  SpO2: 100% (29 Aug 2022 15:38) (98% - 100%)    Parameters below as of 29 Aug 2022 15:38  Patient On (Oxygen Delivery Method): room air        PHYSICAL EXAM:  Constitutional: NAD,   HEENT: bilateral deep  icterus , mild nose bleed , dried   Neck: Soft and supple   Respiratory: Breath sounds are clear bilaterally,   Cardiovascular: S1 and S2,    Gastrointestinal: Bowel Sounds present, soft, nontender,  Marked distension, no guarding, no rebound  Extremities: No peripheral edema  Vascular: 2+ peripheral pulses  Neurological: A/O x 3, no focal deficits  Musculoskeletal: 5/5 strength b/l upper and lower extremities  Skin: No rashes      MEDICATIONS  (STANDING):  ciprofloxacin     Tablet 500 milliGRAM(s) Oral daily  folic acid 1 milliGRAM(s) Oral daily  lactulose Syrup 10 Gram(s) Oral four times a day  lidocaine   4% Patch 1 Patch Transdermal every 24 hours  pantoprazole    Tablet 40 milliGRAM(s) Oral before breakfast  phytonadione   Solution 5 milliGRAM(s) Oral every 24 hours  potassium chloride    Tablet ER 20 milliEquivalent(s) Oral daily  rifAXIMin 550 milliGRAM(s) Oral two times a day  simethicone 80 milliGRAM(s) Chew daily  sodium chloride 1 Gram(s) Oral two times a day  thiamine 100 milliGRAM(s) Oral daily    MEDICATIONS  (PRN):  aluminum hydroxide/magnesium hydroxide/simethicone Suspension 30 milliLiter(s) Oral every 4 hours PRN Dyspepsia  melatonin 3 milliGRAM(s) Oral at bedtime PRN Insomnia  ondansetron Injectable 4 milliGRAM(s) IV Push every 8 hours PRN Nausea and/or Vomiting                            11.2   18.80 )-----------( 61       ( 29 Aug 2022 06:18 )             32.4   08-29    130<L>  |  99  |  31.9<H>  ----------------------------<  133<H>  4.4   |  19.0<L>  |  0.50    Ca    7.8<L>      29 Aug 2022 06:18    TPro  5.6<L>  /  Alb  2.2<L>  /  TBili  30.7<H>  /  DBili  x   /  AST  96<H>  /  ALT  97<H>  /  AlkPhos  291<H>  08-29      Activated Partial Thromboplastin Time: 100.6: The recommended therapeutic heparin range (full dose) is 58-99 seconds.  Argatroban range is 1.5 to 3.0 times of the baseline APTT value, not to  exceed 100 seconds.  Routine coagulation results should be interpreted with caution when  taking Factor Xa inhibitors or direct thrombin inhibitors; blood sampling  prior to drug intake is recommended. sec (08.29.22 @ 06:18)    INR: 2.28: Recommended targets/ranges for therapeutic INR:  2.0-3.0 Deep vein thrombosis, pulmonary embolism, atrial fibrillation  2.0-3.0 Mechanical aortic valve, antiphospholipid syndrome with previous  arterial or venous thromboembolism  2.5-3.5 Mechanical mitral valve, double mechanical valve (aortic and  mitral positions, high risk valves)  Note: Chest 2012 Feb;141(2 Suppl):7S-47S  Routine coagulation results should be interpreted with caution when  taking Factor Xa inhibitors or direct thrombin inhibitors; blood sampling  prior to drug intake is recommended. ratio (08.29.22 @ 06:18)

## 2022-08-29 NOTE — PROGRESS NOTE ADULT - PROBLEM SELECTOR PLAN 1
Decompensated alcoholic cirrhosis +ascites, encephalopathy. Today MELD score 52.  LFTs s essentially unchanged. Total bili 30. Ammonia 69.   Prednisolone now d/c after unsuccessful, Lille's score 0.957 today.  Off diuretics due to hyponatremia   IR guided therapeutic paracentesis as needed  Rifaximin and lactulose to have 2-2 soft BM  Continue Ciprofloxacin for SBP prophylaxis.    HCC surveillance q 6 months  EGD/Colon as OP   Continue ETOH abstinence Decompensated alcoholic cirrhosis +ascites, encephalopathy. Today MELD score 52.  LFTs s essentially unchanged. Total bili 30. Ammonia 69.   Prednisolone now d/paola after unsuccessful, Lille's score 0.957 today.  Off diuretics due to hyponatremia   IR guided therapeutic paracentesis as needed  Rifaximin and lactulose to have 2-2 soft BM  Continue Ciprofloxacin for SBP prophylaxis.    HCC surveillance q 6 months  EGD/Colon as OP   Continue ETOH abstinence

## 2022-08-29 NOTE — PROGRESS NOTE ADULT - ASSESSMENT
47y/oM PMH EtOH abuse, cirrhosis admitted for decompensated alcoholic cirrhosis, now with episode of epistaxis     >Decompensated alcoholic cirrhosis w/ascites with EtOH abuse   -WBC increasing likely 2/2 steroids , dcd 8/29   -Afebrile  -cont cipro 500mg qd indefinitely for ppx for SBP due to low total BF protein   -cont lactulose, rifaximin   - bcxs 8/26 neg x 2  - peritoneal cxs 8/22 neg x 1   -MRCP done showed cirrhosis with portal HTN and large ascites  - will consult IR consult for repeat therapeutic paracentesis     > epistaxis / coagulopathy , likely due to liver failure   - dc heparin sq for dvt ppx   - repeat vit k doses   - will give 1 unit ffp   - bleeding improved with pressure application , if recurrent then may need ent / ant nose packing     >Hyponatremia / stable    -suspect chronic in setting of EtOH abuse and diuretics use  -s/p albumin 100mg once  -PO fluid restriction 1L daily    >Macrocytic anemia   - likely due to etoh / liver dz   - monitor h/h     >dvt ppx: scds   dispo: Acutely ill, needs  paracentesis

## 2022-08-30 NOTE — CHART NOTE - NSCHARTNOTEFT_GEN_A_CORE
Source: Patient [ ]  Family [ ]   other [x ]    Current Diet: Diet, Regular:   60 gm Protein (PRO60G)  1000mL Fluid Restriction ([id number]) (08-25-22 @ 14:14)    PO intake:  < 50% [ ]   50-75%  [ ]   %  [x ]  other :    Current Weight:   (8/30) 147.7 lbs  Obtain daily wts, continue to monitor  No edema noted    Pertinent Medications: MEDICATIONS  (STANDING):  ciprofloxacin     Tablet 500 milliGRAM(s) Oral daily  folic acid 1 milliGRAM(s) Oral daily  lactulose Syrup 10 Gram(s) Oral four times a day  lidocaine   4% Patch 1 Patch Transdermal every 24 hours  pantoprazole    Tablet 40 milliGRAM(s) Oral before breakfast  phytonadione   Solution 5 milliGRAM(s) Oral every 24 hours  potassium chloride    Tablet ER 20 milliEquivalent(s) Oral daily  rifAXIMin 550 milliGRAM(s) Oral two times a day  simethicone 80 milliGRAM(s) Chew daily  sodium chloride 1 Gram(s) Oral two times a day  thiamine 100 milliGRAM(s) Oral daily    MEDICATIONS  (PRN):  aluminum hydroxide/magnesium hydroxide/simethicone Suspension 30 milliLiter(s) Oral every 4 hours PRN Dyspepsia  melatonin 3 milliGRAM(s) Oral at bedtime PRN Insomnia  ondansetron Injectable 4 milliGRAM(s) IV Push every 8 hours PRN Nausea and/or Vomiting    Pertinent Labs: CBC Full  -  ( 30 Aug 2022 06:24 )  WBC Count : 16.37 K/uL  RBC Count : 2.70 M/uL  Hemoglobin : 9.6 g/dL  Hematocrit : 29.4 %  Platelet Count - Automated : 45 K/uL  Mean Cell Volume : 108.9 fl  Mean Cell Hemoglobin : 35.6 pg  Mean Cell Hemoglobin Concentration : 32.7 gm/dL  Auto Neutrophil # : 14.80 K/uL  Auto Lymphocyte # : 0.43 K/uL  Auto Monocyte # : 0.72 K/uL  Auto Eosinophil # : 0.00 K/uL  Auto Basophil # : 0.00 K/uL  Auto Neutrophil % : 90.4 %  Auto Lymphocyte % : 2.6 %  Auto Monocyte % : 4.4 %  Auto Eosinophil % : 0.0 %  Auto Basophil % : 0.0 %  08-30 Na128 mmol/L<L> Glu 151 mg/dL<H> K+ 4.2 mmol/L Cr  <0.20 mg/dL<L> BUN 31.5 mg/dL<H> Phos n/a   Alb 2.3 g/dL<L> PAB n/a       Skin: no breakdown noted  Abhishek: 21    Nutrition focused physical exam previously conducted - found signs of malnutrition [ ]absent [ x]present    Subcutaneous fat loss: [x ] Orbital fat pads region, [ x]Buccal fat region, [ ]Triceps region,  [ ]Ribs region    Muscle wasting: [x ]Temples region, [x ]Clavicle region, [x ]Shoulder region, [ ]Scapula region, [ ]Interosseous region,  [ ]thigh region, [ ]Calf region    Estimated Needs:   [ x] no change since previous assessment  [ ] recalculated:     Current Nutrition Diagnosis: Pt remains at high nutrition risk and meets criteria for severe, chronic malnutrition related to inability to meet sufficient protein-energy in setting of decompensated alcoholic cirrhosis w/ascites as evidenced by mod/sev muscle loss, mod fat loss; 20% wt loss x ~2 yrs. Pt with good po intake, consumed 100% of breakfast per tray observation. RD to remain available.     Recommendations:   1) Consider removing protein restriction   2) Change diet to DASH/TLC with Ensure Enlive TID to optimize po intake and provide an additional 350kcal, 20g protein per serving   3) Fluid restriction per MD discretion  4) Continue folic acid and thiamine, RX: MVI daily  5) Monitor wts and labs    Monitoring and Evaluation:   [x ] PO intake [x ] Tolerance to diet prescription [X] Weights  [X] Follow up per protocol [X] Labs:

## 2022-08-30 NOTE — CONSULT NOTE ADULT - SUBJECTIVE AND OBJECTIVE BOX
46 y/o Jaundiced M w/ a hx of ?Deep Vein Thrombosis (not on AC as per pt), Hypertension, Alcohol use disorder, liver failure transferred to [facility] from [facility] for liver cirrhosis. Pt has an extensive hx of etoh abuse, last drink 3 weeks prior. Recently discharged from [facility] on [place]/15 for treatment of alcoholic liver disease.Patient had noticed his abdomen getting bloated and firm, making him uncomfortable with the pressure. It is effecting his ability to eat-getting full much faster, trying to eat frequent smaller portions. He is on Fluid restriction, and complains about his mouth being very dry. He denies any CP, SOB, Abd pain, paresthesias, fevers, fatigue. Today he is awake and alert, but c/o he is very tired and is not sleeping. He C/O liquid feces draining from his rectum.  He denies abdominal pain, N/V constipation.   utilized for entire consult, and GOC conversation. Patient is not aware of how sick he is, he minimizes his Liver Disease-laments about being thirsty and needing to drink water and juice. Ambulating to BR. No fever or chills  HPI:  46 y/o M w/ a hx of ?Deep Vein Thrombosis (not on AC as per pt), Hypertension, Alcohol use disorder, liver failure transferred to [facility] from [facility] for liver cirrhosis. Pt has an extensive hx of etoh abuse, last drink 3 weeks prior. Recently discharged from [facility] on 8/15 for treatment of alcoholic liver disease. States that over the past two days he has noticed bloating in the abdomen, denying Transferred for further GI workup. Seen at bedside, alert and cooperative. Endorses compliance w/ medications and alcohol cessation. ROS Neg other than mentioned.  FHx: Father - DM; Mother non contributory  PSHx: None  SHx: Never smoker; last ETOH 3 weeks ago; No recreational drugs     (20 Aug 2022 16:52)      PERTINENT PMH REVIEWED: Yes     PAST MEDICAL & SURGICAL HISTORY:  Cirrhosis    SOCIAL HISTORY:                      Substance history: ETOH abuse                    Admitted from:  [facility]                    Yarsanism/spirituality: CAT                    Cultural concerns:  needs an                       Surrogate/HCP/Guardian NONE-only mentions he has cousins that are not willing to help him anymore    FAMILY HISTORY:    No Known Allergies    ADVANCE DIRECTIVES/TREATMENT PREFERENCES:  Full code, all aggressive measures desired     Baseline ADLs (prior to admission):  Independent/ unemployed      Karnofsky/Palliative Performance Status Version 30:  %    Present Symptoms:     Dyspnea: none  Nausea/Vomiting:  No  Anxiety:   No  Depression: Yes   Fatigue: Yes not sleeping  Loss of appetite: Yes can only tolerate small portions  Constipation: liquid stool from Lactulose    Pain: Denies            Character-            Duration-            Effect-            Factors-            Frequency-            Location-            Severity-    Review of Systems: Reviewed                       All others negative    MEDICATIONS  (STANDING):  Biotene Dry Mouth Oral Rinse 5 milliLiter(s) Swish and Spit every 4 hours  ciprofloxacin     Tablet 500 milliGRAM(s) Oral daily  folic acid 1 milliGRAM(s) Oral daily  lactulose Syrup 10 Gram(s) Oral four times a day  lidocaine   4% Patch 1 Patch Transdermal every 24 hours  pantoprazole    Tablet 40 milliGRAM(s) Oral before breakfast  phytonadione   Solution 5 milliGRAM(s) Oral every 24 hours  potassium chloride    Tablet ER 20 milliEquivalent(s) Oral daily  rifAXIMin 550 milliGRAM(s) Oral two times a day  simethicone 80 milliGRAM(s) Chew daily  sodium chloride 1 Gram(s) Oral two times a day  thiamine 100 milliGRAM(s) Oral daily    MEDICATIONS  (PRN):  aluminum hydroxide/magnesium hydroxide/simethicone Suspension 30 milliLiter(s) Oral every 4 hours PRN Dyspepsia  melatonin 3 milliGRAM(s) Oral at bedtime PRN Insomnia  ondansetron Injectable 4 milliGRAM(s) IV Push every 8 hours PRN Nausea and/or Vomiting    PHYSICAL EXAM:    Vital Signs Last 24 Hrs  T(C): 36.5 (30 Aug 2022 09:54), Max: 37 (30 Aug 2022 04:48)  T(F): 97.7 (30 Aug 2022 09:54), Max: 98.6 (30 Aug 2022 04:48)  HR: 72 (30 Aug 2022 09:54) (70 - 78)  BP: 115/70 (30 Aug 2022 09:54) (108/64 - 118/67)  BP(mean): --  RR: 18 (30 Aug 2022 09:54) (18 - 18)  SpO2: 99% (30 Aug 2022 09:54) (99% - 100%)    Parameters below as of 30 Aug 2022 09:54  Patient On (Oxygen Delivery Method): room air    General: alert  oriented x __3__fatigued                  cachexia  verbal British speaking  awake and engaging    HEENT:   dry mouth      Lungs: comfortable    CV: normal     GI:   distended  tender  ecchymotic tracks lower abdomen from Heparin injections    Liquid stools     : voiding    MSK:  weakness  edema             ambulatory      Neuro: no focal deficits    Skin:  no rash    LABS:                        9.6    16.37 )-----------( 45       ( 30 Aug 2022 06:24 )             29.4     08-30    128<L>  |  98  |  31.5<H>  ----------------------------<  151<H>  4.2   |  18.0<L>  |  <0.20<L>    Ca    7.8<L>      30 Aug 2022 06:24  Mg     1.9     08-30    TPro  5.3<L>  /  Alb  2.3<L>  /  TBili  29.7<H>  /  DBili  x   /  AST  102<H>  /  ALT  96<H>  /  AlkPhos  252<H>  08-30    PT/INR - ( 30 Aug 2022 06:24 )   PT: 23.6 sec;   INR: 2.02 ratio         PTT - ( 30 Aug 2022 06:24 )  PTT:61.4 sec    I&O's Summary    30 Aug 2022 07:01  -  30 Aug 2022 16:17  --------------------------------------------------------  IN: 480 mL / OUT: 1000 mL / NET: -520 mL    RADIOLOGY & ADDITIONAL STUDIES:  < from: CT Abdomen and Pelvis w/ IV Cont (08.21.22 @ 10:44) >  IMPRESSION:  Cirrhotic liver with large volume ascites. No gross liver mass.    Colonic thickening could represent colitis or portal colopathy.    < end of copied text >       46 y/o Jaundiced M w/ a hx of ?Deep Vein Thrombosis (not on AC as per pt), Hypertension, Alcohol use disorder, liver failure transferred to [facility] from [facility] for liver cirrhosis. Pt has an extensive hx of etoh abuse, last drink 3 weeks prior. Recently discharged from [facility] on 8/15 for treatment of alcoholic liver disease.Patient had noticed his abdomen getting bloated and firm, making him uncomfortable with the pressure. It is effecting his ability to eat-getting full much faster, trying to eat frequent smaller portions. He is on Fluid restriction, and complains about his mouth being very dry. He denies any CP, SOB, Abd pain, paresthesias, fevers, fatigue. Today he is awake and alert, but c/o he is very tired and is not sleeping. He C/O liquid feces draining from his rectum.  He denies abdominal pain, N/V constipation.   utilized for entire consult, and GOC conversation. Patient is not aware of how sick he is, he minimizes his Liver Disease-laments about being thirsty and needing to drink water and juice. Ambulating to BR. No fever or chills  HPI:  46 y/o M w/ a hx of ?Deep Vein Thrombosis (not on AC as per pt), Hypertension, Alcohol use disorder, liver failure transferred to [facility] from [facility] for liver cirrhosis. Pt has an extensive hx of etoh abuse, last drink 3 weeks prior. Recently discharged from [facility] on 8/15 for treatment of alcoholic liver disease. States that over the past two days he has noticed bloating in the abdomen, denying Transferred for further GI workup. Seen at bedside, alert and cooperative. Endorses compliance w/ medications and alcohol cessation. ROS Neg other than mentioned.  FHx: Father - DM; Mother non contributory  PSHx: None  SHx: Never smoker; last ETOH 3 weeks ago; No recreational drugs     (20 Aug 2022 16:52)      PERTINENT PMH REVIEWED: Yes     PAST MEDICAL & SURGICAL HISTORY:  Cirrhosis    SOCIAL HISTORY:                      Substance history: ETOH abuse                    Admitted from:  [facility]                    Congregational/spirituality: CAT                    Cultural concerns:  needs an                       Surrogate/HCP/Guardian NONE-only mentions he has cousins that are not willing to help him anymore    FAMILY HISTORY:    No Known Allergies    ADVANCE DIRECTIVES/TREATMENT PREFERENCES:  Full code, all aggressive measures desired     Baseline ADLs (prior to admission):  Independent/ unemployed      Karnofsky/Palliative Performance Status Version 30:  %    Present Symptoms:     Dyspnea: none  Nausea/Vomiting:  No  Anxiety:   No  Depression: Yes   Fatigue: Yes not sleeping  Loss of appetite: Yes can only tolerate small portions  Constipation: liquid stool from Lactulose    Pain: Denies            Character-            Duration-            Effect-            Factors-            Frequency-            Location-            Severity-    Review of Systems: Reviewed                       All others negative    MEDICATIONS  (STANDING):  Biotene Dry Mouth Oral Rinse 5 milliLiter(s) Swish and Spit every 4 hours  ciprofloxacin     Tablet 500 milliGRAM(s) Oral daily  folic acid 1 milliGRAM(s) Oral daily  lactulose Syrup 10 Gram(s) Oral four times a day  lidocaine   4% Patch 1 Patch Transdermal every 24 hours  pantoprazole    Tablet 40 milliGRAM(s) Oral before breakfast  phytonadione   Solution 5 milliGRAM(s) Oral every 24 hours  potassium chloride    Tablet ER 20 milliEquivalent(s) Oral daily  rifAXIMin 550 milliGRAM(s) Oral two times a day  simethicone 80 milliGRAM(s) Chew daily  sodium chloride 1 Gram(s) Oral two times a day  thiamine 100 milliGRAM(s) Oral daily    MEDICATIONS  (PRN):  aluminum hydroxide/magnesium hydroxide/simethicone Suspension 30 milliLiter(s) Oral every 4 hours PRN Dyspepsia  melatonin 3 milliGRAM(s) Oral at bedtime PRN Insomnia  ondansetron Injectable 4 milliGRAM(s) IV Push every 8 hours PRN Nausea and/or Vomiting    PHYSICAL EXAM:    Vital Signs Last 24 Hrs  T(C): 36.5 (30 Aug 2022 09:54), Max: 37 (30 Aug 2022 04:48)  T(F): 97.7 (30 Aug 2022 09:54), Max: 98.6 (30 Aug 2022 04:48)  HR: 72 (30 Aug 2022 09:54) (70 - 78)  BP: 115/70 (30 Aug 2022 09:54) (108/64 - 118/67)  BP(mean): --  RR: 18 (30 Aug 2022 09:54) (18 - 18)  SpO2: 99% (30 Aug 2022 09:54) (99% - 100%)    Parameters below as of 30 Aug 2022 09:54  Patient On (Oxygen Delivery Method): room air    General: alert  oriented x __3__fatigued                  cachexia  verbal Samoan speaking  awake and engaging    HEENT:   dry mouth      Lungs: comfortable    CV: normal     GI:   distended  tender  ecchymotic tracks lower abdomen from Heparin injections    Liquid stools     : voiding    MSK:  weakness  edema             ambulatory      Neuro: no focal deficits    Skin:  no rash    LABS:                        9.6    16.37 )-----------( 45       ( 30 Aug 2022 06:24 )             29.4     08-30    128<L>  |  98  |  31.5<H>  ----------------------------<  151<H>  4.2   |  18.0<L>  |  <0.20<L>    Ca    7.8<L>      30 Aug 2022 06:24  Mg     1.9     08-30    TPro  5.3<L>  /  Alb  2.3<L>  /  TBili  29.7<H>  /  DBili  x   /  AST  102<H>  /  ALT  96<H>  /  AlkPhos  252<H>  08-30    PT/INR - ( 30 Aug 2022 06:24 )   PT: 23.6 sec;   INR: 2.02 ratio         PTT - ( 30 Aug 2022 06:24 )  PTT:61.4 sec    I&O's Summary    30 Aug 2022 07:01  -  30 Aug 2022 16:17  --------------------------------------------------------  IN: 480 mL / OUT: 1000 mL / NET: -520 mL    RADIOLOGY & ADDITIONAL STUDIES:  < from: CT Abdomen and Pelvis w/ IV Cont (08.21.22 @ 10:44) >  IMPRESSION:  Cirrhotic liver with large volume ascites. No gross liver mass.    Colonic thickening could represent colitis or portal colopathy.    INTERPRETATION:  CLINICAL INFORMATION: Cirrhosis. Liver failure. Jaundice.    COMPARISON: Contrast-enhanced CT of the abdomen and pelvis August 21, 2022.    < from: MR MRCP w/wo IV Cont (08.26.22 @ 16:35) >  ACC: [id number] EXAM:  MR MRCP WAW IC                          PROCEDURE DATE:  08/26/2022      PROCEDURE:  MRI of the abdomen was performed. Study is limited by large ascites and   respiratory motion artifact.  MRCP was performed.    FINDINGS:  LOWER CHEST: Within normal limits.    LIVER: Again is noted a shrunken liver with a nodular contour with mild   heterogeneous signal and enhancement, compatible with cirrhosis.No   discrete hyperenhancing liver lesion with washout is identified.   Recanalization of the paraumbilical veins. The hepatic veins and portal   vein are patent.  BILE DUCTS: There is no intra or extrahepatic biliary duct dilatation.   The common bile duct measures 5 mm. There is no evidence of filling   defect in the common bile duct to suggest choledocholithiasis. There is   no abnormal enhancement of the biliary tree.  GALLBLADDER: Contracted.  SPLEEN: Within normal limits.  PANCREAS: Within normal limits.  ADRENALS: Within normal limits.  KIDNEYS/URETERS: Duplicated left renal collecting system. Otherwise,   within limits.    VISUALIZED PORTIONS:  BOWEL: Diffuse mild edematous wall thickening of the visualized small   bowel colon, compatible with portal hypertension. The mesenteric vessels   opacify unremarkably.  PERITONEUM: No ascites.  VESSELS: Within normal limits.  RETROPERITONEUM/LYMPH NODES: No lymphadenopathy.  ABDOMINAL WALL: Within normal limits.  BONES: Within normal limits.    IMPRESSION: Cirrhotic liver with portal hypertension and large ascites.    < end of copied text >

## 2022-08-30 NOTE — PROGRESS NOTE ADULT - SUBJECTIVE AND OBJECTIVE BOX
cc: liver failure , epistaxis       interval hx: patient seen and eval.  no new episode of epistaxis,  comfortable. denies sob , abd pain , n/v     Vital Signs Last 24 Hrs  T(C): 36.7 (08-30-22 @ 16:19), Max: 37 (08-30-22 @ 04:48)  T(F): 98 (08-30-22 @ 16:19), Max: 98.6 (08-30-22 @ 04:48)  HR: 77 (08-30-22 @ 16:19) (70 - 78)  BP: 103/63 (08-30-22 @ 16:19) (103/63 - 118/67)  BP(mean): --  RR: 18 (08-30-22 @ 16:19) (18 - 18)  SpO2: 98% (08-30-22 @ 16:19) (98% - 100%)        PHYSICAL EXAM:  Constitutional: NAD,   HEENT: bilateral deep  icterus , mild nose bleed , dried   Neck: Soft and supple   Respiratory: Breath sounds are clear bilaterally,   Cardiovascular: S1 and S2,    Gastrointestinal: Bowel Sounds present, soft, nontender,  Marked distension, no guarding, no rebound  Extremities: No peripheral edema  Vascular: 2+ peripheral pulses  Neurological: A/O x 3, no focal deficits  Musculoskeletal: 5/5 strength b/l upper and lower extremities  Skin: No rashes      MEDICATIONS  (STANDING):  ciprofloxacin     Tablet 500 milliGRAM(s) Oral daily  folic acid 1 milliGRAM(s) Oral daily  lactulose Syrup 10 Gram(s) Oral four times a day  lidocaine   4% Patch 1 Patch Transdermal every 24 hours  pantoprazole    Tablet 40 milliGRAM(s) Oral before breakfast  phytonadione   Solution 5 milliGRAM(s) Oral every 24 hours  potassium chloride    Tablet ER 20 milliEquivalent(s) Oral daily  rifAXIMin 550 milliGRAM(s) Oral two times a day  simethicone 80 milliGRAM(s) Chew daily  sodium chloride 1 Gram(s) Oral two times a day  thiamine 100 milliGRAM(s) Oral daily    MEDICATIONS  (PRN):  aluminum hydroxide/magnesium hydroxide/simethicone Suspension 30 milliLiter(s) Oral every 4 hours PRN Dyspepsia  melatonin 3 milliGRAM(s) Oral at bedtime PRN Insomnia  ondansetron Injectable 4 milliGRAM(s) IV Push every 8 hours PRN Nausea and/or Vomiting                                                9.6    16.37 )-----------( 45       ( 30 Aug 2022 06:24 )             29.4 08-30    128<L>  |  98  |  31.5<H>  ----------------------------<  151<H>  4.2   |  18.0<L>  |  <0.20<L>    Ca    7.8<L>      30 Aug 2022 06:24  Mg     1.9     08-30    TPro  5.3<L>  /  Alb  2.3<L>  /  TBili  29.7<H>  /  DBili  x   /  AST  102<H>  /  ALT  96<H>  /  AlkPhos  252<H>  08-30    MEDICATIONS  (STANDING):  Biotene Dry Mouth Oral Rinse 5 milliLiter(s) Swish and Spit every 4 hours  ciprofloxacin     Tablet 500 milliGRAM(s) Oral daily  folic acid 1 milliGRAM(s) Oral daily  lactulose Syrup 10 Gram(s) Oral four times a day  lidocaine   4% Patch 1 Patch Transdermal every 24 hours  pantoprazole    Tablet 40 milliGRAM(s) Oral before breakfast  phytonadione   Solution 5 milliGRAM(s) Oral every 24 hours  potassium chloride    Tablet ER 20 milliEquivalent(s) Oral daily  rifAXIMin 550 milliGRAM(s) Oral two times a day  simethicone 80 milliGRAM(s) Chew daily  sodium chloride 1 Gram(s) Oral two times a day  thiamine 100 milliGRAM(s) Oral daily    MEDICATIONS  (PRN):  aluminum hydroxide/magnesium hydroxide/simethicone Suspension 30 milliLiter(s) Oral every 4 hours PRN Dyspepsia  melatonin 3 milliGRAM(s) Oral at bedtime PRN Insomnia  ondansetron Injectable 4 milliGRAM(s) IV Push every 8 hours PRN Nausea and/or Vomiting

## 2022-08-30 NOTE — CONSULT NOTE ADULT - ASSESSMENT
47 year old male with ESLD, admitted with decompensated cirrhosis/ascites.   Course notable for coagulopathy - INR 2.3  S/p IV vitamin K and now on PO vitamin K.    Elevated INR  Thrombocytopenia  Anemia  alcoholic liver Cirrhosis    Plan:  Check fibrinogen  In event of bleeding, if platelets <50K - can transfuse 1-2 units platelets.  Also can give cryoprecipitate over FFP due to lesser volume.  Anemia - Hgb baseline 11 g/dL, dropped to 9 g/dl today, monitor closely  Please call with questions.  47 year old male with ESLD, admitted with decompensated cirrhosis/ascites.   Course notable for coagulopathy - INR 2.3  S/p IV vitamin K and now on PO vitamin K.    Elevated INR  Thrombocytopenia  Anemia  alcoholic liver Cirrhosis    Plan:  Check fibrinogen  In event of bleeding, if platelets <50K - can transfuse 1-2 units platelets.  Also can give cryoprecipitate over FFP due to lesser volume.   Can also transfuse platelets prior to planned procedures if plts <50K.  Anemia - Hgb baseline 11 g/dL, dropped to 9 g/dl today, monitor closely  Please call with questions.   D/w Dr. Sales

## 2022-08-30 NOTE — PROGRESS NOTE ADULT - PROBLEM SELECTOR PLAN 1
Decompensated alcoholic cirrhosis +ascites, encephalopathy. MELD score 52 yesterday  LFTs s essentially unchanged. Total bili 29.7  Prednisolone d/c after 7 days as no response, high Lille's score   IR guided therapeutic paracentesis as needed  Rifaximin and lactulose to have 2-2 soft BM  Continue Ciprofloxacin for SBP prophylaxis.    HCC surveillance q 6 months  EGD/Colon as OP   Continue ETOH abstinence

## 2022-08-30 NOTE — CONSULT NOTE ADULT - NSCONSULTADDITIONALINFOA_GEN_ALL_CORE
Total Time Spent__60__ minutes  This includes chart review, patient assessment, discussion and collaboration with interdisciplinary team members, ACP planning    COUNSELING:  Face to face meeting to discuss Advanced Care Planning - Time Spent ___35___Minutes.      Thank you for the opportunity to assist with the care of this patient.   [facility] Palliative Medicine Consult Service [phone].

## 2022-08-30 NOTE — CONSULT NOTE ADULT - ASSESSMENT
46 yo  Male diffuse jaundice and icterus, transferred with Alcoholic Liver Cirrhosis, for GI work-up from [facility]. 46 yo  Male with decompensated cirrhosis with possible component of underling hepatitis He has diffuse jaundice and icterus, abdominal bloating. Transferred with Alcoholic Liver Cirrhosis, for GI work-up from [facility]. S/P Paracentesis, R/O SBP (-). MRCP showed no evidence of  choledocholithiasis.     Problem/Plan - 1:   Problem: Alcoholic cirrhosis of liver.    Decompensated alcoholic cirrhosis +ascites   MELD score 52 yesterday  IR guided therapeutic paracentesis as needed  Rifaximin and lactulose to have 2-2 soft BM  HCC surveillance q 6 months    Problem/Plan -2-  Coagulopathy  Heme/Onc consulted  INR 2.3  S/P Vit K and now supplemented with Oral Vit K  Elevated INR  Thrombocytopenia  Anemia  - Hgb baseline 11 g/dL, dropped to 9 g/dl today, should be  monitored closely  If there is a bleeding event, and platelets <50K transfuse 1-2 units platelets  Can give cryoprecipitate over FFP due to lesser volume.  Transfuse platelets prior to planned procedures if plts <50K.    Recurrent Ascitis  Continue Ciprofloxacin for SBP prophylaxis.      Encephalopathy. resolving   Monitor LFTs s essentially unchanged. Total bili 29.7  Prednisolone discontinued after 7 days-with no response  Continue Lactulose    Depression/Isolation  Use  service every time a provider visits this Patient  Cannot name a HCP-Cousins will not help him anymore  Continue to stress ETOH abstinence.  Patient determined to stop drinking alcohol, but in denial as to  how much damage he has already done to his Liver  Agrees to speak with a Uzbek Speaking     [facility]  Return home soon  "I'm still sick"  Cannot contemplate Advance Directives at this time  FULL CODE  Before he is discharged, Palliative team will offer to explain MOLST Directive-Uzbek version will be provided. Help him to choose how much medical intervention he would want  at end of life  F/U with GI in [place]  EGD/Colon as OP               Advanced Liver Cirrhosis  Alcoholic Liver Failure with Ascitis  GI consulted   Large amt of fluid accumulated in abdomen upon admission  Ascitis is recurrent  Fluid Restriction to 1000ml

## 2022-08-30 NOTE — PROGRESS NOTE ADULT - ASSESSMENT
47y/oM PMH EtOH abuse, cirrhosis admitted for decompensated alcoholic cirrhosis, now with episode of epistaxis     >Decompensated alcoholic cirrhosis w/ascites with EtOH abuse   -WBC increasing likely 2/2 steroids , dcd 8/29 after 7 days as per gi   -Afebrile  -cont cipro 500mg qd indefinitely for ppx for SBP due to low total BF protein   -cont lactulose, rifaximin   - bcxs 8/26 neg x 2  - peritoneal cxs 8/22 neg x 1   -MRCP done showed cirrhosis with portal HTN and large ascites  - IR consulted  for repeat therapeutic paracentesis , unable to do repeat paracentesis at thsi time as INR remains high   - follow up op with hepatology  after dc     > epistaxis / coagulopathy , likely due to liver failure   - dc heparin sq for dvt ppx   - c/w  vit k  repeat 3 doses   - sp 1 unit ffp   - monitor for bleeding   - heme consulted , recommend to Check fibrinogen, if bleeding and  if platelets <50K - can transfuse 1-2 units platelets.  Also can give cryoprecipitate over FFP due to lesser volume.  transfuse platelets prior to planned procedures if plts <50K.    >Macrocytic anemia   - likely due to etoh / liver dz   - monitor h/h     >Hyponatremia / stable    -suspect chronic in setting of EtOH abuse and diuretics use  -s/p albumin 100mg once  -PO fluid restriction 1L daily    >dvt ppx: scds   dispo: monitor coags , bleeding , pallaitive consult , poor prognosis

## 2022-08-30 NOTE — PROGRESS NOTE ADULT - ASSESSMENT
47M with decompensated cirrhosis and possible component of underling hepatitis p/w jaundice and abdominal bloating. s/p paracentesis and no SBP. MRCP showed no evidence of choledocholithiasis.

## 2022-08-30 NOTE — PROGRESS NOTE ADULT - SUBJECTIVE AND OBJECTIVE BOX
Chief Complaint: This is a 47y old man patient being seen in follow-up consultation for Cirrhosis.    Interval HPI / 24H events:  Patient seen and evaluated at bedside, reporting no complaints, no overnight events. Patient denies abdominal pain, nausea, vomiting,     ROS: A 14-point review of systems was reviewed and was otherwise negative save what was reported in the HPI.    PAST MEDICAL/SURGICAL HISTORY:  Cirrhosis      MEDICATIONS  (STANDING):  ciprofloxacin     Tablet 500 milliGRAM(s) Oral daily  folic acid 1 milliGRAM(s) Oral daily  lactulose Syrup 10 Gram(s) Oral four times a day  lidocaine   4% Patch 1 Patch Transdermal every 24 hours  pantoprazole    Tablet 40 milliGRAM(s) Oral before breakfast  phytonadione   Solution 5 milliGRAM(s) Oral every 24 hours  potassium chloride    Tablet ER 20 milliEquivalent(s) Oral daily  rifAXIMin 550 milliGRAM(s) Oral two times a day  simethicone 80 milliGRAM(s) Chew daily  sodium chloride 1 Gram(s) Oral two times a day  thiamine 100 milliGRAM(s) Oral daily    MEDICATIONS  (PRN):  aluminum hydroxide/magnesium hydroxide/simethicone Suspension 30 milliLiter(s) Oral every 4 hours PRN Dyspepsia  melatonin 3 milliGRAM(s) Oral at bedtime PRN Insomnia  ondansetron Injectable 4 milliGRAM(s) IV Push every 8 hours PRN Nausea and/or Vomiting    No Known Allergies    T(C): 36.5 (08-30-22 @ 09:54), Max: 37 (08-30-22 @ 04:48)  HR: 72 (08-30-22 @ 09:54) (70 - 78)  BP: 115/70 (08-30-22 @ 09:54) (108/64 - 118/67)  RR: 18 (08-30-22 @ 09:54) (18 - 18)  SpO2: 99% (08-30-22 @ 09:54) (99% - 100%)        PHYSICAL EXAM:    Constitutional: No acute distress  Neuro: confused,   HEENT: PERRL, icteric sclerae  Neck: supple, no JVD  CV: regular rate, regular rhythm, +S1S2,   Pulm/chest: lung sounds clear bilaterally, no accessory muscle use noted  Abd: +ascites, obese, soft, NT, ND, +BS  Ext: no Cyanosis, clubbing or edema  Skin: warm, well perfused, + jaundice   Psych: calm, appropriate affect      LABS:               9.6    16.37 )-----------( 45       ( 08-30 @ 06:24 )             29.4                11.2   18.80 )-----------( 61       ( 08-29 @ 06:18 )             32.4                11.4   16.90 )-----------( 53       ( 08-28 @ 06:18 )             31.7       08-30    128<L>  |  98  |  31.5<H>  ----------------------------<  151<H>  4.2   |  18.0<L>  |  <0.20<L>    Ca    7.8<L>      30 Aug 2022 06:24  Mg     1.9     08-30    TPro  5.3<L>  /  Alb  2.3<L>  /  TBili  29.7<H>  /  DBili  x   /  AST  102<H>  /  ALT  96<H>  /  AlkPhos  252<H>  08-30    LIVER FUNCTIONS - ( 30 Aug 2022 06:24 )  Alb: 2.3 g/dL / Pro: 5.3 g/dL / ALK PHOS: 252 U/L / ALT: 96 U/L / AST: 102 U/L / GGT: x               PT/INR - ( 30 Aug 2022 06:24 )   PT: 23.6 sec;   INR: 2.02 ratio         PTT - ( 30 Aug 2022 06:24 )  PTT:61.4 sec

## 2022-08-30 NOTE — PROGRESS NOTE ADULT - NS ATTEND AMEND GEN_ALL_CORE FT
47 M  EtOH cirrhosis  MELD 52  poor prognosis  asking hepatology for further input  abstinent x 2 mo but not responding to pred, whole family in [facility]

## 2022-08-30 NOTE — CONSULT NOTE ADULT - SUBJECTIVE AND OBJECTIVE BOX
REASON FOR CONSULTATION:     HPI:  48 y/o M w/ a hx of ?Deep Vein Thrombosis (not on AC as per pt), Hypertension, Alcohol use disorder, liver failure transferred to [facility] from [facility] for liver cirrhosis. Pt has an extensive hx of etoh abuse, last drink 3 weeks prior. [place] [facility] [place] of alcoholic liver disease. States that over the past two days he has noticed bloating in the abdomen, denying any CP, SOB, Abd pain, paresthesias, fevers, fatigue. Transferred for further GI workup. Seen at bedside, alert and cooperative. Endorses compliance w/ medications and alcohol cessation. ROS Neg other than mentioned.      FHx: Father - DM; Mother non contributory  PSHx: None  SHx: Never smoker; last ETOH 3 weeks ago; No recreational drugs     (20 Aug 2022 16:52)      REVIEW OF SYSTEMS:  Constitutional, Eyes, ENT, Cardiovascular, Respiratory, Gastrointestinal, Genitourinary, Musculoskeletal, Integumentary, Neurological, Psychiatric, Endocrine, Heme/Lymph, and Allergic/Immunologic review of systems are otherwise negative except as noted in the HPI.      Allergies    No Known Allergies    Intolerances        MEDICATIONS  (STANDING):  ciprofloxacin     Tablet 500 milliGRAM(s) Oral daily  folic acid 1 milliGRAM(s) Oral daily  lactulose Syrup 10 Gram(s) Oral four times a day  lidocaine   4% Patch 1 Patch Transdermal every 24 hours  pantoprazole    Tablet 40 milliGRAM(s) Oral before breakfast  phytonadione   Solution 5 milliGRAM(s) Oral every 24 hours  potassium chloride    Tablet ER 20 milliEquivalent(s) Oral daily  rifAXIMin 550 milliGRAM(s) Oral two times a day  simethicone 80 milliGRAM(s) Chew daily  sodium chloride 1 Gram(s) Oral two times a day  thiamine 100 milliGRAM(s) Oral daily    MEDICATIONS  (PRN):  aluminum hydroxide/magnesium hydroxide/simethicone Suspension 30 milliLiter(s) Oral every 4 hours PRN Dyspepsia  melatonin 3 milliGRAM(s) Oral at bedtime PRN Insomnia  ondansetron Injectable 4 milliGRAM(s) IV Push every 8 hours PRN Nausea and/or Vomiting      Vital Signs Last 24 Hrs  T(C): 36.5 (30 Aug 2022 09:54), Max: 37 (30 Aug 2022 04:48)  T(F): 97.7 (30 Aug 2022 09:54), Max: 98.6 (30 Aug 2022 04:48)  HR: 72 (30 Aug 2022 09:54) (70 - 78)  BP: 115/70 (30 Aug 2022 09:54) (108/64 - 118/67)  BP(mean): --  RR: 18 (30 Aug 2022 09:54) (18 - 18)  SpO2: 99% (30 Aug 2022 09:54) (99% - 100%)    Parameters below as of 30 Aug 2022 09:54  Patient On (Oxygen Delivery Method): room air        PHYSICAL EXAM:    GENERAL: NAD, well-groomed, well-developed  HEAD:  Atraumatic, Normocephalic  EYES: EOMI,   NECK: Supple,   NERVOUS SYSTEM:  Alert & Oriented X3, Good concentration;   CHEST/LUNG: Clear to auscultation bilaterally;   HEART: Regular rate and rhythm;   ABDOMEN: Soft, Nontender,   EXTREMITIES:  no edema  LYMPH: No lymphadenopathy noted  SKIN: No rashes or lesions      LABS:                        9.6    16.37 )-----------( 45       ( 30 Aug 2022 06:24 )             29.4     08-30    128<L>  |  98  |  31.5<H>  ----------------------------<  151<H>  4.2   |  18.0<L>  |  <0.20<L>    Ca    7.8<L>      30 Aug 2022 06:24  Mg     1.9     08-30    TPro  5.3<L>  /  Alb  2.3<L>  /  TBili  29.7<H>  /  DBili  x   /  AST  102<H>  /  ALT  96<H>  /  AlkPhos  252<H>  08-30    PT/INR - ( 30 Aug 2022 06:24 )   PT: 23.6 sec;   INR: 2.02 ratio         PTT - ( 30 Aug 2022 06:24 )  PTT:61.4 sec        RADIOLOGY & ADDITIONAL STUDIES:  < from: MR MRCP w/wo IV Cont (08.26.22 @ 16:35) >  ACC: [id number] EXAM:  MR MRCP WAW IC                          PROCEDURE DATE:  08/26/2022          INTERPRETATION:  CLINICAL INFORMATION: Cirrhosis. Liver failure. Jaundice.    COMPARISON: Contrast-enhanced CT of the abdomen and pelvis August 21, 2022.    CONTRAST/COMPLICATIONS:  IV Contrast: Gadavist  6 cc administered   1.5 cc discarded  Oral Contrast: NONE  Complications: None reported at time of study completion    PROCEDURE:  MRI of the abdomen was performed. Study is limited by large ascites and   respiratory motion artifact.  MRCP was performed.    FINDINGS:  LOWER CHEST: Within normal limits.    LIVER: Again is noted a shrunken liver with a nodular contour with mild   heterogeneous signal and enhancement, compatible with cirrhosis.No   discrete hyperenhancing liver lesion with washout is identified.   Recanalization of the paraumbilical veins. The hepatic veins and portal   vein are patent.  BILE DUCTS: There is no intra or extrahepatic biliary duct dilatation.   The common bile duct measures 5 mm. There is no evidence of filling   defect in the common bile duct to suggest choledocholithiasis. There is   no abnormal enhancement of the biliary tree.  GALLBLADDER: Contracted.  SPLEEN: Within normal limits.  PANCREAS: Within normal limits.  ADRENALS: Within normal limits.  KIDNEYS/URETERS: Duplicated left renal collecting system. Otherwise,   within limits.    VISUALIZED PORTIONS:  BOWEL: Diffuse mild edematous wall thickening of the visualized small   bowel colon, compatible with portal hypertension. The mesenteric vessels   opacify unremarkably.  PERITONEUM: No ascites.  VESSELS: Within normal limits.  RETROPERITONEUM/LYMPH NODES: No lymphadenopathy.  ABDOMINAL WALL: Within normal limits.  BONES: Within normal limits.    IMPRESSION: Cirrhotic liver with portal hypertension and large ascites.    < end of copied text >       REASON FOR CONSULTATION:     HPI:  46 y/o M w/ a hx of ?Deep Vein Thrombosis (not on AC as per pt), Hypertension, Alcohol use disorder, liver failure transferred to [facility] from [facility] for liver cirrhosis. Pt has an extensive hx of etoh abuse, last drink 3 weeks prior. Recently discharged from [facility] on 8/15 for treatment of alcoholic liver disease. States that over the past two days he has noticed bloating in the abdomen, denying any CP, SOB, Abd pain, paresthesias, fevers, fatigue. Transferred for further GI workup. S/p epistaxis yesterday, denies any bleeding today.      FHx: Father - DM; Mother non contributory  PSHx: None  SHx: Never smoker; last ETOH 3 weeks ago; No recreational drugs     (20 Aug 2022 16:52)      REVIEW OF SYSTEMS:  Constitutional, Eyes, ENT, Cardiovascular, Respiratory, Gastrointestinal, Genitourinary, Musculoskeletal, Integumentary, Neurological, Psychiatric, Endocrine, Heme/Lymph, and Allergic/Immunologic review of systems are otherwise negative except as noted in the HPI.      Allergies    No Known Allergies    Intolerances        MEDICATIONS  (STANDING):  ciprofloxacin     Tablet 500 milliGRAM(s) Oral daily  folic acid 1 milliGRAM(s) Oral daily  lactulose Syrup 10 Gram(s) Oral four times a day  lidocaine   4% Patch 1 Patch Transdermal every 24 hours  pantoprazole    Tablet 40 milliGRAM(s) Oral before breakfast  phytonadione   Solution 5 milliGRAM(s) Oral every 24 hours  potassium chloride    Tablet ER 20 milliEquivalent(s) Oral daily  rifAXIMin 550 milliGRAM(s) Oral two times a day  simethicone 80 milliGRAM(s) Chew daily  sodium chloride 1 Gram(s) Oral two times a day  thiamine 100 milliGRAM(s) Oral daily    MEDICATIONS  (PRN):  aluminum hydroxide/magnesium hydroxide/simethicone Suspension 30 milliLiter(s) Oral every 4 hours PRN Dyspepsia  melatonin 3 milliGRAM(s) Oral at bedtime PRN Insomnia  ondansetron Injectable 4 milliGRAM(s) IV Push every 8 hours PRN Nausea and/or Vomiting      Vital Signs Last 24 Hrs  T(C): 36.5 (30 Aug 2022 09:54), Max: 37 (30 Aug 2022 04:48)  T(F): 97.7 (30 Aug 2022 09:54), Max: 98.6 (30 Aug 2022 04:48)  HR: 72 (30 Aug 2022 09:54) (70 - 78)  BP: 115/70 (30 Aug 2022 09:54) (108/64 - 118/67)  BP(mean): --  RR: 18 (30 Aug 2022 09:54) (18 - 18)  SpO2: 99% (30 Aug 2022 09:54) (99% - 100%)    Parameters below as of 30 Aug 2022 09:54  Patient On (Oxygen Delivery Method): room air        PHYSICAL EXAM:    GENERAL: NAD, jaundiced   HEAD:  Atraumatic, Normocephalic  EYES: EOMI,   NECK: Supple,   NERVOUS SYSTEM:  Alert & Oriented X3, Good concentration;   CHEST/LUNG: Clear to auscultation bilaterally;   HEART: Regular rate and rhythm;   ABDOMEN: distended, echymoses on lower abd  EXTREMITIES:  no edema  LYMPH: No lymphadenopathy noted  SKIN: No rashes or lesions      LABS:                        9.6    16.37 )-----------( 45       ( 30 Aug 2022 06:24 )             29.4     08-30    128<L>  |  98  |  31.5<H>  ----------------------------<  151<H>  4.2   |  18.0<L>  |  <0.20<L>    Ca    7.8<L>      30 Aug 2022 06:24  Mg     1.9     08-30    TPro  5.3<L>  /  Alb  2.3<L>  /  TBili  29.7<H>  /  DBili  x   /  AST  102<H>  /  ALT  96<H>  /  AlkPhos  252<H>  08-30    PT/INR - ( 30 Aug 2022 06:24 )   PT: 23.6 sec;   INR: 2.02 ratio         PTT - ( 30 Aug 2022 06:24 )  PTT:61.4 sec        RADIOLOGY & ADDITIONAL STUDIES:  < from: MR MRCP w/wo IV Cont (08.26.22 @ 16:35) >  ACC: [id number] EXAM:  MR MRCP WAW IC                          PROCEDURE DATE:  08/26/2022          INTERPRETATION:  CLINICAL INFORMATION: Cirrhosis. Liver failure. Jaundice.    COMPARISON: Contrast-enhanced CT of the abdomen and pelvis August 21, 2022.    CONTRAST/COMPLICATIONS:  IV Contrast: Gadavist  6 cc administered   1.5 cc discarded  Oral Contrast: NONE  Complications: None reported at time of study completion    PROCEDURE:  MRI of the abdomen was performed. Study is limited by large ascites and   respiratory motion artifact.  MRCP was performed.    FINDINGS:  LOWER CHEST: Within normal limits.    LIVER: Again is noted a shrunken liver with a nodular contour with mild   heterogeneous signal and enhancement, compatible with cirrhosis.No   discrete hyperenhancing liver lesion with washout is identified.   Recanalization of the paraumbilical veins. The hepatic veins and portal   vein are patent.  BILE DUCTS: There is no intra or extrahepatic biliary duct dilatation.   The common bile duct measures 5 mm. There is no evidence of filling   defect in the common bile duct to suggest choledocholithiasis. There is   no abnormal enhancement of the biliary tree.  GALLBLADDER: Contracted.  SPLEEN: Within normal limits.  PANCREAS: Within normal limits.  ADRENALS: Within normal limits.  KIDNEYS/URETERS: Duplicated left renal collecting system. Otherwise,   within limits.    VISUALIZED PORTIONS:  BOWEL: Diffuse mild edematous wall thickening of the visualized small   bowel colon, compatible with portal hypertension. The mesenteric vessels   opacify unremarkably.  PERITONEUM: No ascites.  VESSELS: Within normal limits.  RETROPERITONEUM/LYMPH NODES: No lymphadenopathy.  ABDOMINAL WALL: Within normal limits.  BONES: Within normal limits.    IMPRESSION: Cirrhotic liver with portal hypertension and large ascites.    < end of copied text >

## 2022-08-31 DIAGNOSIS — K70.40 ALCOHOLIC HEPATIC FAILURE WITHOUT COMA: ICD-10-CM

## 2022-08-31 DIAGNOSIS — F10.239 ALCOHOL DEPENDENCE WITH WITHDRAWAL, UNSPECIFIED: ICD-10-CM

## 2022-08-31 DIAGNOSIS — E87.6 HYPOKALEMIA: ICD-10-CM

## 2022-08-31 DIAGNOSIS — E87.1 HYPO-OSMOLALITY AND HYPONATREMIA: ICD-10-CM

## 2022-08-31 NOTE — PROGRESS NOTE ADULT - SUBJECTIVE AND OBJECTIVE BOX
Palliative Care Followup    OVERNIGHT EVENTS:    Present Symptoms:     Dyspnea: Mild Moderate Severe  Nausea/Vomiting: Yes No  Anxiety:  Yes No  Depression: Yes No  Fatigue: Yes No  Loss of appetite: Yes No  Constipation:     Pain:             Character-            Duration-            Effect-            Factors-            Frequency-            Location-            Severity-    Pain AD Score:  http://geriatrictoolkit.[email or web address]/painad.pdf (press ctrl + left click to view)    Review of Systems: Reviewed                     Negative:                     Positive:  Unable to obtain due to poor mentation   All others negative    MEDICATIONS  (STANDING):  Biotene Dry Mouth Oral Rinse 5 milliLiter(s) Swish and Spit every 4 hours  ciprofloxacin     Tablet 500 milliGRAM(s) Oral daily  folic acid 1 milliGRAM(s) Oral daily  lactulose Syrup 10 Gram(s) Oral four times a day  lidocaine   4% Patch 1 Patch Transdermal every 24 hours  pantoprazole    Tablet 40 milliGRAM(s) Oral before breakfast  potassium chloride    Tablet ER 20 milliEquivalent(s) Oral daily  rifAXIMin 550 milliGRAM(s) Oral two times a day  simethicone 80 milliGRAM(s) Chew daily  spironolactone 100 milliGRAM(s) Oral daily  thiamine 100 milliGRAM(s) Oral daily    MEDICATIONS  (PRN):  aluminum hydroxide/magnesium hydroxide/simethicone Suspension 30 milliLiter(s) Oral every 4 hours PRN Dyspepsia  melatonin 3 milliGRAM(s) Oral at bedtime PRN Insomnia  ondansetron Injectable 4 milliGRAM(s) IV Push every 8 hours PRN Nausea and/or Vomiting      PHYSICAL EXAM:    Vital Signs Last 24 Hrs  T(C): 36.4 (31 Aug 2022 10:40), Max: 36.8 (31 Aug 2022 04:38)  T(F): 97.6 (31 Aug 2022 10:40), Max: 98.2 (31 Aug 2022 04:38)  HR: 76 (31 Aug 2022 10:40) (74 - 77)  BP: 116/74 (31 Aug 2022 10:40) (103/63 - 116/74)  BP(mean): --  RR: 18 (31 Aug 2022 10:40) (18 - 18)  SpO2: 100% (31 Aug 2022 10:40) (98% - 100%)    Parameters below as of 31 Aug 2022 10:40  Patient On (Oxygen Delivery Method): room air        General: alert  oriented x ____ lethargic agitated                  cachexia  nonverbal  coma    Karnofsky:  %    HEENT: normal  dry mouth  ET tube/trach    Lungs: comfortable tachypnea/labored breathing  excessive secretions    CV: normal  tachycardia    GI: normal  distended  tender  no BS               PEG/NG/OG tube  constipation  last BM:     : normal  incontinent  oliguria/anuria  fountain    MSK: normal  weakness  edema             ambulatory  bedbound/wheelchair bound    Skin: normal  pressure ulcers- Stage_____  no rash    LABS:                          10.2   19.46 )-----------( 51       ( 31 Aug 2022 06:10 )             29.6     08-31    129<L>  |  100  |  33.6<H>  ----------------------------<  149<H>  3.6   |  17.0<L>  |  0.31<L>    Ca    7.7<L>      31 Aug 2022 06:10  Mg     1.9     08-31    TPro  5.3<L>  /  Alb  2.2<L>  /  TBili  31.4<H>  /  DBili  x   /  AST  122<H>  /  ALT  109<H>  /  AlkPhos  257<H>  08-31    PT/INR - ( 31 Aug 2022 06:10 )   PT: 27.6 sec;   INR: 2.36 ratio         PTT - ( 30 Aug 2022 06:24 )  PTT:61.4 sec    I&O's Summary    30 Aug 2022 07:01  -  31 Aug 2022 07:00  --------------------------------------------------------  IN: 480 mL / OUT: 1000 mL / NET: -520 mL        RADIOLOGY & ADDITIONAL STUDIES:  MRCP 8/26/22  IMPRESSION: Cirrhotic liver with portal hypertension and large ascites.    ADVANCE DIRECTIVES/TREATMENT PREFERENCES:  Full Code Palliative Care Followup    OVERNIGHT EVENTS:    Present Symptoms:     Dyspnea: no  Nausea/Vomiting: No  Anxiety:  No  Depression: No  Fatigue: No  Loss of appetite: No  Constipation: no    Pain: Denies pain            Character-            Duration-            Effect-            Factors-            Frequency-            Location-            Severity-    Pain AD Score:0  [email or web address]/[email or web address] (press ctrl + left click to view)    Review of Systems: Reviewed                     Negative: no pain  All others negative    MEDICATIONS  (STANDING):  Biotene Dry Mouth Oral Rinse 5 milliLiter(s) Swish and Spit every 4 hours  ciprofloxacin     Tablet 500 milliGRAM(s) Oral daily  folic acid 1 milliGRAM(s) Oral daily  lactulose Syrup 10 Gram(s) Oral four times a day  lidocaine   4% Patch 1 Patch Transdermal every 24 hours  pantoprazole    Tablet 40 milliGRAM(s) Oral before breakfast  potassium chloride    Tablet ER 20 milliEquivalent(s) Oral daily  rifAXIMin 550 milliGRAM(s) Oral two times a day  simethicone 80 milliGRAM(s) Chew daily  spironolactone 100 milliGRAM(s) Oral daily  thiamine 100 milliGRAM(s) Oral daily    MEDICATIONS  (PRN):  aluminum hydroxide/magnesium hydroxide/simethicone Suspension 30 milliLiter(s) Oral every 4 hours PRN Dyspepsia  melatonin 3 milliGRAM(s) Oral at bedtime PRN Insomnia  ondansetron Injectable 4 milliGRAM(s) IV Push every 8 hours PRN Nausea and/or Vomiting      PHYSICAL EXAM:    Vital Signs Last 24 Hrs  T(C): 36.4 (31 Aug 2022 10:40), Max: 36.8 (31 Aug 2022 04:38)  T(F): 97.6 (31 Aug 2022 10:40), Max: 98.2 (31 Aug 2022 04:38)  HR: 76 (31 Aug 2022 10:40) (74 - 77)  BP: 116/74 (31 Aug 2022 10:40) (103/63 - 116/74)  BP(mean): --  RR: 18 (31 Aug 2022 10:40) (18 - 18)  SpO2: 100% (31 Aug 2022 10:40) (98% - 100%)    Parameters below as of 31 Aug 2022 10:40  Patient On (Oxygen Delivery Method): room air      General: alert  oriented x __3    Karnofsky:  %60    HEENT: jaundice of eyes    Lungs: comfortable     CV: normal      GI: distended      : normal     MSK: weakness     Skin: jaundice skin     LABS:                          10.2   19.46 )-----------( 51       ( 31 Aug 2022 06:10 )             29.6     08-31    129<L>  |  100  |  33.6<H>  ----------------------------<  149<H>  3.6   |  17.0<L>  |  0.31<L>    Ca    7.7<L>      31 Aug 2022 06:10  Mg     1.9     08-31    TPro  5.3<L>  /  Alb  2.2<L>  /  TBili  31.4<H>  /  DBili  x   /  AST  122<H>  /  ALT  109<H>  /  AlkPhos  257<H>  08-31    PT/INR - ( 31 Aug 2022 06:10 )   PT: 27.6 sec;   INR: 2.36 ratio         PTT - ( 30 Aug 2022 06:24 )  PTT:61.4 sec    I&O's Summary    30 Aug 2022 07:01  -  31 Aug 2022 07:00  --------------------------------------------------------  IN: 480 mL / OUT: 1000 mL / NET: -520 mL        RADIOLOGY & ADDITIONAL STUDIES:  MRCP 8/26/22  IMPRESSION: Cirrhotic liver with portal hypertension and large ascites.    ADVANCE DIRECTIVES/TREATMENT PREFERENCES:  Full Code Palliative Care Followup    OVERNIGHT EVENTS:none    Present Symptoms:     Dyspnea: no  Nausea/Vomiting: No  Anxiety:  No  Depression: No  Fatigue: No  Loss of appetite: No  Constipation: no    Pain: Denies pain            Character-            Duration-            Effect-            Factors-            Frequency-            Location-            Severity-    Pain AD Score:0  [email or web address] (press ctrl + left click to view)    Review of Systems: Reviewed                     Negative: no pain  All others negative    MEDICATIONS  (STANDING):  Biotene Dry Mouth Oral Rinse 5 milliLiter(s) Swish and Spit every 4 hours  ciprofloxacin     Tablet 500 milliGRAM(s) Oral daily  folic acid 1 milliGRAM(s) Oral daily  lactulose Syrup 10 Gram(s) Oral four times a day  lidocaine   4% Patch 1 Patch Transdermal every 24 hours  pantoprazole    Tablet 40 milliGRAM(s) Oral before breakfast  potassium chloride    Tablet ER 20 milliEquivalent(s) Oral daily  rifAXIMin 550 milliGRAM(s) Oral two times a day  simethicone 80 milliGRAM(s) Chew daily  spironolactone 100 milliGRAM(s) Oral daily  thiamine 100 milliGRAM(s) Oral daily    MEDICATIONS  (PRN):  aluminum hydroxide/magnesium hydroxide/simethicone Suspension 30 milliLiter(s) Oral every 4 hours PRN Dyspepsia  melatonin 3 milliGRAM(s) Oral at bedtime PRN Insomnia  ondansetron Injectable 4 milliGRAM(s) IV Push every 8 hours PRN Nausea and/or Vomiting      PHYSICAL EXAM:    Vital Signs Last 24 Hrs  T(C): 36.4 (31 Aug 2022 10:40), Max: 36.8 (31 Aug 2022 04:38)  T(F): 97.6 (31 Aug 2022 10:40), Max: 98.2 (31 Aug 2022 04:38)  HR: 76 (31 Aug 2022 10:40) (74 - 77)  BP: 116/74 (31 Aug 2022 10:40) (103/63 - 116/74)  BP(mean): --  RR: 18 (31 Aug 2022 10:40) (18 - 18)  SpO2: 100% (31 Aug 2022 10:40) (98% - 100%)    Parameters below as of 31 Aug 2022 10:40  Patient On (Oxygen Delivery Method): room air      General: alert  oriented x __3    Karnofsky:  %60    HEENT: jaundice of eyes    Lungs: comfortable     CV: normal      GI: distended      : normal     MSK: weakness     Skin: jaundice skin     LABS:                          10.2   19.46 )-----------( 51       ( 31 Aug 2022 06:10 )             29.6     08-31    129<L>  |  100  |  33.6<H>  ----------------------------<  149<H>  3.6   |  17.0<L>  |  0.31<L>    Ca    7.7<L>      31 Aug 2022 06:10  Mg     1.9     08-31    TPro  5.3<L>  /  Alb  2.2<L>  /  TBili  31.4<H>  /  DBili  x   /  AST  122<H>  /  ALT  109<H>  /  AlkPhos  257<H>  08-31    PT/INR - ( 31 Aug 2022 06:10 )   PT: 27.6 sec;   INR: 2.36 ratio         PTT - ( 30 Aug 2022 06:24 )  PTT:61.4 sec    I&O's Summary    30 Aug 2022 07:01  -  31 Aug 2022 07:00  --------------------------------------------------------  IN: 480 mL / OUT: 1000 mL / NET: -520 mL        RADIOLOGY & ADDITIONAL STUDIES:  MRCP 8/26/22  IMPRESSION: Cirrhotic liver with portal hypertension and large ascites.    ADVANCE DIRECTIVES/TREATMENT PREFERENCES:  Full Code

## 2022-08-31 NOTE — PROGRESS NOTE ADULT - NS ATTEND AMEND GEN_ALL_CORE FT
Continue stable but with very advanced cirrhosis and ascites which we will try Aldactone 100 mg p.o. daily as with Lasix he developed recurrent worsening of his hyponatremia.  Otherwise we will simply continue with current medical regimen.

## 2022-08-31 NOTE — PROGRESS NOTE ADULT - ASSESSMENT
This is a 47 year old male with end stage liver disease    Problem/Recommendation 1:Cirrhosis  MRCP significant for Cirrhotic liver with portal hypertension and large ascites.  Decompensated alcoholic cirrhosis +ascites  MELD score 32 today  GI following , c/w recs    Problem/Recommendation 2:Ascites  s/p paracentesis   to continue if patient is able to tolerate procedure (currently has high INR)    Problem/Recommendation 3: Debility/weakness  Assist in ADLS  Maintain safety, fall, aspiration precautions    Problem/Recommendation 4: Palliative Care Encounter  Met with patient at bedside, as a followup from consult by palliative care NP yesterday  [place]  ID number [id number] used during our encounter  Discussed eligibility for hospice and explained at length the hospice program at home and how it can help with symptom relief of his cirrhosis and ascites but at the end of the conversation patient declined services  Re-addressed code status- Discussed code status at length. Explained the difference between Do Not Resuscitate/Do Not Intubate versus CPR and Intubation. At the end of our conversation decision was made for CPR and Intubation (FULL CODE)  Patient states his overall goal is to return home, he also states that he is going to stop drinking alcohol and change his diet     GOC have been established, no further needs to be addressed from Palliative care perspective.    Will sign off at this time.   Please reconsult if GOC change or condition decompensates requiring further palliative care assistance.    HCP/Surrogate:Declines designation  Code Status:  Full Code                                                              Thank you for the opportunity to assist with the care of this patient.   [facility] Palliative Medicine Consult Service [phone].

## 2022-08-31 NOTE — CHART NOTE - NSCHARTNOTEFT_GEN_A_CORE
RN called to notify pt with chills.    Pt assessed at bedside at 2000.  #[id number].   Pt states he began to feel chills earlier however upon examination pt states he feels better and his chills have resolved. Pt has no other complaints at this time.   Pt denies chest pain, palpitations, SOB, light headedness, dizziness, headache, nausea/vomiting, fevers/chills, abdominal pain, dysuria or increased urinary frequency.      Vital Signs Last 24 Hrs  T(C): 38.1 (31 Aug 2022 21:55), Max: 38.1 (31 Aug 2022 19:50)  T(F): 100.5 (31 Aug 2022 21:55), Max: 100.5 (31 Aug 2022 19:50)  HR: 97 (31 Aug 2022 21:55) (71 - 100)  BP: 104/61 (31 Aug 2022 21:55) (104/61 - 139/76)  RR: 18 (31 Aug 2022 21:55) (18 - 18)  SpO2: 98% (31 Aug 2022 21:55) (98% - 100%)      GENERAL: NAD, lying comfortably in bed, A&Ox4  EYES: EOMI, PERRLA  ENMT: Moist mucous membranes  NERVOUS SYSTEM: Alert and awake, Good concentration  CHEST/LUNG: Clear to auscultation b/l; Unlabored respirations  HEART: S1S2+, Regular rate  ABDOMEN: Soft, Nontender, distended; BS+   EXTREMITIES:  2+ Peripheral Pulses, no tenderness to palpation of b/l LE  SKIN: Warm and dry    unlikely transfusion related reaction    Plan  650mg Tylenol once  continue to monitor VS  VSS at this time  continue to monitor for changes in pt status  RN to notify for acute changes in pt status

## 2022-08-31 NOTE — PROGRESS NOTE ADULT - PROBLEM SELECTOR PLAN 1
Decompensated alcoholic cirrhosis +ascites, encephalopathy. Today his MELD score 32.   LFTs s essentially unchanged. Total bili 31. INR 2.36. Thrombocytopenia, platelet count 51K  Prednisolone d/c after 7 days as no response, high Lille's score   Worsening ascites, therapeutic paracentesis if able, (elevated INR, athrombocytopenic)  Continue Rifaximin and lactulose   Will start Aldactone 100 mg daily;  worsening hyponatremia. Will hold off Lasix at this time  Monitor renal function, electrolytes, LFTs, coags  Continue Ciprofloxacin for SBP prophylaxis.    Continue MVI. thiamine, folic acid  Continue Protonix  Continue ETOH abstinence

## 2022-08-31 NOTE — CONSULT NOTE ADULT - SUBJECTIVE AND OBJECTIVE BOX
HPI: The patient is a 47 year old male with PMH of HTN, ? DVT (not on AC as per pt) and significant ETOH abuse resulting in cirrhosis complicated by ascites initially presented to [facility] for worsening abdominal distension, then transferred to [facility] for decompensated alcoholic cirrhosis. While here, he underwent large volume paracentesis on 08/22/2022 with 3.8L fluid removal. Fluid studies negative for SBP. Also received 7 day course of steroids. Currently awaiting repeat paracentesis pending improvement to elevated INR. Hospital course notable for epistaxis, coagulopathy and hyponatremia. Nephrology was consulted for management of hyponatremia. Communicated with the patient through [employer]  [id number] - he complained of dry mouth, increase thirst and abdominal discomfort from abdominal distension.     PAST MEDICAL & SURGICAL HISTORY:  Cirrhosis    Allergies  No Known Allergies  Intolerances    Home Medications:  folic acid 1 mg oral tablet: 1 tab(s) orally once a day (20 Aug 2022 16:44)  lactulose 10 g/15 mL oral syrup: 15 milliliter(s) orally 2 times a day (20 Aug 2022 16:44)  Potassium Chloride (Eqv-Klor-Con 10) 10 mEq oral tablet, extended release: 1 tab(s) orally 2 times a day (20 Aug 2022 16:45)  Slow Magnesium Chloride with Calcium 71 mg-118 mg oral delayed release tablet: 2 tab(s) orally once a day (20 Aug 2022 16:45)    FAMILY HISTORY: Unclear     Social History: Significant ETOH history (patient unable to provide duration and types of alcoholic drinks)    Vital Signs Last 24 Hrs  T(C): 36.7 (31 Aug 2022 15:31), Max: 36.8 (31 Aug 2022 04:38)  T(F): 98 (31 Aug 2022 15:31), Max: 98.2 (31 Aug 2022 04:38)  HR: 73 (31 Aug 2022 15:31) (71 - 77)  BP: 124/67 (31 Aug 2022 15:31) (103/63 - 124/67)  BP(mean): --  RR: 18 (31 Aug 2022 15:31) (18 - 18)  SpO2: 98% (31 Aug 2022 15:31) (98% - 100%)    Parameters below as of 31 Aug 2022 15:31  Patient On (Oxygen Delivery Method): room air    I&O's Summary  30 Aug 2022 07:01  -  31 Aug 2022 07:00  --------------------------------------------------------  IN: 480 mL / OUT: 1000 mL / NET: -520 mL    Physical Exam  General: Thin male in NAD; lying flat in bed  Cardiac: S1S2 RRR  Respiratory: CTAB posteriorly  Abdomen: Distended, soft, +fluid wave, nontender  Extremities: No edema  Neuro: AAOx3    08-31    129<L>  |  100  |  33.6<H>  ----------------------------<  149<H>  3.6   |  17.0<L>  |  0.31<L>    Ca    7.7<L>      31 Aug 2022 06:10  Mg     1.9     08-31    TPro  5.3<L>  /  Alb  2.2<L>  /  TBili  31.4<H>  /  DBili  x   /  AST  122<H>  /  ALT  109<H>  /  AlkPhos  257<H>  08-31               10.2   19.46 )-----------( 51       ( 31 Aug 2022 06:10 )             29.6     MEDICATIONS  (STANDING):  Biotene Dry Mouth Oral Rinse 5 milliLiter(s) Swish and Spit every 4 hours  ciprofloxacin     Tablet 500 milliGRAM(s) Oral daily  folic acid 1 milliGRAM(s) Oral daily  lactulose Syrup 10 Gram(s) Oral four times a day  lidocaine   4% Patch 1 Patch Transdermal every 24 hours  pantoprazole    Tablet 40 milliGRAM(s) Oral before breakfast  phytonadione  IVPB 10 milliGRAM(s) IV Intermittent daily  potassium chloride    Tablet ER 20 milliEquivalent(s) Oral daily  rifAXIMin 550 milliGRAM(s) Oral two times a day  simethicone 80 milliGRAM(s) Chew daily  spironolactone 100 milliGRAM(s) Oral daily  thiamine 100 milliGRAM(s) Oral daily    MEDICATIONS  (PRN):  aluminum hydroxide/magnesium hydroxide/simethicone Suspension 30 milliLiter(s) Oral every 4 hours PRN Dyspepsia  melatonin 3 milliGRAM(s) Oral at bedtime PRN Insomnia  ondansetron Injectable 4 milliGRAM(s) IV Push every 8 hours PRN Nausea and/or Vomiting

## 2022-08-31 NOTE — PROGRESS NOTE ADULT - SUBJECTIVE AND OBJECTIVE BOX
cc: liver failure ,    interval hx: patient seen and eval.    used. no new bleeding. no fever or chills. denies any n/v. abd more distended. denies  pain     Vital Signs Last 24 Hrs  T(C): 36.4 (31 Aug 2022 10:40), Max: 36.8 (31 Aug 2022 04:38)  T(F): 97.6 (31 Aug 2022 10:40), Max: 98.2 (31 Aug 2022 04:38)  HR: 76 (31 Aug 2022 10:40) (74 - 77)  BP: 116/74 (31 Aug 2022 10:40) (103/63 - 116/74)  BP(mean): --  RR: 18 (31 Aug 2022 10:40) (18 - 18)  SpO2: 100% (31 Aug 2022 10:40) (98% - 100%)    Parameters below as of 31 Aug 2022 10:40  Patient On (Oxygen Delivery Method): room air          PHYSICAL EXAM:  Constitutional: NAD,   HEENT: bilateral deep  icterus , mild nose bleed , dried   Neck: Soft and supple   Respiratory: Breath sounds are clear bilaterally,   Cardiovascular: S1 and S2,    Gastrointestinal: Bowel Sounds present, soft, nontender,  Marked distension, no guarding, no rebound  Extremities: No peripheral edema  Vascular: 2+ peripheral pulses  Neurological: A/O x 3, no focal deficits  Musculoskeletal: 5/5 strength b/l upper and lower extremities  Skin: No rashes    MEDICATIONS  (STANDING):  Biotene Dry Mouth Oral Rinse 5 milliLiter(s) Swish and Spit every 4 hours  ciprofloxacin     Tablet 500 milliGRAM(s) Oral daily  folic acid 1 milliGRAM(s) Oral daily  lactulose Syrup 10 Gram(s) Oral four times a day  lidocaine   4% Patch 1 Patch Transdermal every 24 hours  pantoprazole    Tablet 40 milliGRAM(s) Oral before breakfast  phytonadione  IVPB 10 milliGRAM(s) IV Intermittent daily  potassium chloride    Tablet ER 20 milliEquivalent(s) Oral daily  rifAXIMin 550 milliGRAM(s) Oral two times a day  simethicone 80 milliGRAM(s) Chew daily  spironolactone 100 milliGRAM(s) Oral daily  thiamine 100 milliGRAM(s) Oral daily    MEDICATIONS  (PRN):  aluminum hydroxide/magnesium hydroxide/simethicone Suspension 30 milliLiter(s) Oral every 4 hours PRN Dyspepsia  melatonin 3 milliGRAM(s) Oral at bedtime PRN Insomnia  ondansetron Injectable 4 milliGRAM(s) IV Push every 8 hours PRN Nausea and/or Vomiting                            10.2   19.46 )-----------( 51       ( 31 Aug 2022 06:10 )             29.6   08-31    129<L>  |  100  |  33.6<H>  ----------------------------<  149<H>  3.6   |  17.0<L>  |  0.31<L>    Ca    7.7<L>      31 Aug 2022 06:10  Mg     1.9     08-31    TPro  5.3<L>  /  Alb  2.2<L>  /  TBili  31.4<H>  /  DBili  x   /  AST  122<H>  /  ALT  109<H>  /  AlkPhos  257<H>  08-31  INR: 2.36: Recommended targets/ranges for therapeutic INR:  2.0-3.0 Deep vein thrombosis, pulmonary embolism, atrial fibrillation  2.0-3.0 Mechanical aortic valve, antiphospholipid syndrome with previous  arterial or venous thromboembolism  2.5-3.5 Mechanical mitral valve, double mechanical valve (aortic and  mitral positions, high risk valves)  Note: Chest 2012 Feb;141(2 Suppl):7S-47S  Routine coagulation results should be interpreted with caution when  taking Factor Xa inhibitors or direct thrombin inhibitors; blood sampling  prior to drug intake is recommended. ratio (08.31.22 @ 06:10)

## 2022-08-31 NOTE — PROGRESS NOTE ADULT - SUBJECTIVE AND OBJECTIVE BOX
Chief Complaint: This is a 47y old man patient being seen in follow-up consultation for Cirrhosis.    Interval HPI / 24H events:  Patient seen and evaluated at bedside, reporting no complaints, no overnight events. Patient appears more awake, alert, and oriented today. Total bili 31.3, Hyponatremia, sodium 129. INR 2.36 today. Patient denies abdominal pain, nausea, vomiting,     Review of Systems:  . Constitutional: No fever, chills,  · ENMT: no vertigo, no throat pain  . Neck: No pain or stiffness  · Respiratory and Thorax: No shortness of breath, no cough, no wheezing  · Cardiovascular: No chest pain, palpitation, no dizziness, no orthopnea,   · Gastrointestinal: see above.  · Genitourinary: No hematuria, no dysuria  · Musculoskeletal: Negative  · Neurological: no headache, no vision changes, no slurred speech  · Psychiatric: no agitation, no anxiety  · Hematology/Lymphatics: negative  · Endocrine: negative      PAST MEDICAL/SURGICAL HISTORY:  Cirrhosis      MEDICATIONS  (STANDING):  Biotene Dry Mouth Oral Rinse 5 milliLiter(s) Swish and Spit every 4 hours  ciprofloxacin     Tablet 500 milliGRAM(s) Oral daily  folic acid 1 milliGRAM(s) Oral daily  lactulose Syrup 10 Gram(s) Oral four times a day  lidocaine   4% Patch 1 Patch Transdermal every 24 hours  pantoprazole    Tablet 40 milliGRAM(s) Oral before breakfast  potassium chloride    Tablet ER 20 milliEquivalent(s) Oral daily  rifAXIMin 550 milliGRAM(s) Oral two times a day  simethicone 80 milliGRAM(s) Chew daily  spironolactone 25 milliGRAM(s) Oral daily  thiamine 100 milliGRAM(s) Oral daily    MEDICATIONS  (PRN):  aluminum hydroxide/magnesium hydroxide/simethicone Suspension 30 milliLiter(s) Oral every 4 hours PRN Dyspepsia  melatonin 3 milliGRAM(s) Oral at bedtime PRN Insomnia  ondansetron Injectable 4 milliGRAM(s) IV Push every 8 hours PRN Nausea and/or Vomiting    No Known Allergies    T(C): 36.4 (08-31-22 @ 10:40), Max: 36.8 (08-31-22 @ 04:38)  HR: 76 (08-31-22 @ 10:40) (74 - 77)  BP: 116/74 (08-31-22 @ 10:40) (103/63 - 116/74)  RR: 18 (08-31-22 @ 10:40) (18 - 18)  SpO2: 100% (08-31-22 @ 10:40) (98% - 100%)    I&O's Summary    30 Aug 2022 07:01  -  31 Aug 2022 07:00  --------------------------------------------------------  IN: 480 mL / OUT: 1000 mL / NET: -520 mL      PHYSICAL EXAM:    Constitutional: No acute distress, icteric  Neuro: confused,   HEENT: PERRL, icteric sclerae  Neck: supple, no JVD  CV: regular rate, regular rhythm, +S1S2,   Pulm/chest: lung sounds clear bilaterally, no accessory muscle use noted  Abd: +ascites, obese, soft, NT, ND, +BS. Ecchymotic area on lower abdomen  Ext: no Cyanosis, clubbing or edema  Skin: warm, + jaundice   Psych: calm, appropriate affect      LABS:               10.2   19.46 )-----------( 51       ( 08-31 @ 06:10 )             29.6                9.6    16.37 )-----------( 45       ( 08-30 @ 06:24 )             29.4                11.2   18.80 )-----------( 61       ( 08-29 @ 06:18 )             32.4       08-31    129<L>  |  100  |  33.6<H>  ----------------------------<  149<H>  3.6   |  17.0<L>  |  0.31<L>    Ca    7.7<L>      31 Aug 2022 06:10  Mg     1.9     08-31    TPro  5.3<L>  /  Alb  2.2<L>  /  TBili  31.4<H>  /  DBili  x   /  AST  122<H>  /  ALT  109<H>  /  AlkPhos  257<H>  08-31    LIVER FUNCTIONS - ( 31 Aug 2022 06:10 )  Alb: 2.2 g/dL / Pro: 5.3 g/dL / ALK PHOS: 257 U/L / ALT: 109 U/L / AST: 122 U/L / GGT: x               PT/INR - ( 31 Aug 2022 06:10 )   PT: 27.6 sec;   INR: 2.36 ratio         PTT - ( 30 Aug 2022 06:24 )  PTT:61.4 sec         Chief Complaint: This is a 47y old man patient being seen in follow-up consultation for advanced Cirrhosis and ascites    Interval HPI / 24H events:  Patient seen and evaluated at bedside, reporting no complaints, no overnight events. Patient appears more awake, alert, and oriented today. Total bili 31.3, Hyponatremia, sodium 129. INR 2.36 today. Patient denies abdominal pain, nausea, vomiting,     Review of Systems:  . Constitutional: No fever, chills,  · ENMT: no vertigo, no throat pain  . Neck: No pain or stiffness  · Respiratory and Thorax: No shortness of breath, no cough, no wheezing  · Cardiovascular: No chest pain, palpitation, no dizziness, no orthopnea,   · Gastrointestinal: see above.  · Genitourinary: No hematuria, no dysuria  · Musculoskeletal: Negative  · Neurological: no headache, no vision changes, no slurred speech  · Psychiatric: no agitation, no anxiety  · Hematology/Lymphatics: negative  · Endocrine: negative      PAST MEDICAL/SURGICAL HISTORY:  Cirrhosis      MEDICATIONS  (STANDING):  Biotene Dry Mouth Oral Rinse 5 milliLiter(s) Swish and Spit every 4 hours  ciprofloxacin     Tablet 500 milliGRAM(s) Oral daily  folic acid 1 milliGRAM(s) Oral daily  lactulose Syrup 10 Gram(s) Oral four times a day  lidocaine   4% Patch 1 Patch Transdermal every 24 hours  pantoprazole    Tablet 40 milliGRAM(s) Oral before breakfast  potassium chloride    Tablet ER 20 milliEquivalent(s) Oral daily  rifAXIMin 550 milliGRAM(s) Oral two times a day  simethicone 80 milliGRAM(s) Chew daily  spironolactone 25 milliGRAM(s) Oral daily  thiamine 100 milliGRAM(s) Oral daily    MEDICATIONS  (PRN):  aluminum hydroxide/magnesium hydroxide/simethicone Suspension 30 milliLiter(s) Oral every 4 hours PRN Dyspepsia  melatonin 3 milliGRAM(s) Oral at bedtime PRN Insomnia  ondansetron Injectable 4 milliGRAM(s) IV Push every 8 hours PRN Nausea and/or Vomiting    No Known Allergies    T(C): 36.4 (08-31-22 @ 10:40), Max: 36.8 (08-31-22 @ 04:38)  HR: 76 (08-31-22 @ 10:40) (74 - 77)  BP: 116/74 (08-31-22 @ 10:40) (103/63 - 116/74)  RR: 18 (08-31-22 @ 10:40) (18 - 18)  SpO2: 100% (08-31-22 @ 10:40) (98% - 100%)    I&O's Summary    30 Aug 2022 07:01  -  31 Aug 2022 07:00  --------------------------------------------------------  IN: 480 mL / OUT: 1000 mL / NET: -520 mL      PHYSICAL EXAM:    Constitutional: No acute distress, icteric  Neuro: confused,   HEENT: PERRL, icteric sclerae  Neck: supple, no JVD  CV: regular rate, regular rhythm, +S1S2,   Pulm/chest: lung sounds clear bilaterally, no accessory muscle use noted  Abd: +ascites, obese, soft, NT, ND, +BS. Ecchymotic area on lower abdomen  Ext: no Cyanosis, clubbing or edema  Skin: warm, + jaundice   Psych: calm, appropriate affect      LABS:               10.2   19.46 )-----------( 51       ( 08-31 @ 06:10 )             29.6                9.6    16.37 )-----------( 45       ( 08-30 @ 06:24 )             29.4                11.2   18.80 )-----------( 61       ( 08-29 @ 06:18 )             32.4       08-31    129<L>  |  100  |  33.6<H>  ----------------------------<  149<H>  3.6   |  17.0<L>  |  0.31<L>    Ca    7.7<L>      31 Aug 2022 06:10  Mg     1.9     08-31    TPro  5.3<L>  /  Alb  2.2<L>  /  TBili  31.4<H>  /  DBili  x   /  AST  122<H>  /  ALT  109<H>  /  AlkPhos  257<H>  08-31    LIVER FUNCTIONS - ( 31 Aug 2022 06:10 )  Alb: 2.2 g/dL / Pro: 5.3 g/dL / ALK PHOS: 257 U/L / ALT: 109 U/L / AST: 122 U/L / GGT: x               PT/INR - ( 31 Aug 2022 06:10 )   PT: 27.6 sec;   INR: 2.36 ratio         PTT - ( 30 Aug 2022 06:24 )  PTT:61.4 sec

## 2022-08-31 NOTE — PROGRESS NOTE ADULT - ASSESSMENT
47y/oM PMH EtOH abuse, cirrhosis admitted for decompensated alcoholic cirrhosis, had episode of epistaxis , now resolved. plan for repeat IR guided paracentensis prior to dc once INR < 2     >Decompensated alcoholic cirrhosis w/ascites with EtOH abuse   -WBC increasing likely 2/2 steroids , dcd 8/29 after 7 days as per gi   -Afebrile  -cont cipro 500mg qd indefinitely for ppx for SBP due to low total BF protein   -cont lactulose, rifaximin   - bcxs 8/26 neg x 2  - peritoneal cxs 8/22 neg x 1   -MRCP done showed cirrhosis with portal HTN and large ascites  - IR consulted  for repeat therapeutic paracentesis , unable to do repeat paracentesis at thsi time as INR remains high , need INR <2.0 for procedure   - iv vit k another dose ordered 8/31   - cryoprecipitate 1 unit ordered 8/31   - follow up op with hepatology  after dc     > epistaxis / coagulopathy , likely due to liver failure   - dc heparin sq for dvt ppx , scds only   - sp 1 unit ffp , s/p po vit k,   - 8/31 ordered cryprecipitate and iv vit k   - monitor for bleeding   - heme consulted , recommend to Check fibrinogen, if bleeding and  if platelets <50K - can transfuse 1-2 units platelets.  Also can give cryoprecipitate over FFP due to lesser volume.  transfuse platelets prior to planned procedures if plts <50K.    >Macrocytic anemia   - likely due to etoh / liver dz   - monitor h/h     >Hyponatremia / likely due to liver dz   -suspect chronic in setting of EtOH abuse and diuretics use  -s/p albumin 100mg once  -PO fluid restriction 1L daily  - dcd salt tabs as it could worsen ascites   - nephro consulted     >dvt ppx: scds   dispo: monitor coags , bleeding , pallaitive consulted , poor prognosis

## 2022-08-31 NOTE — CONSULT NOTE ADULT - ASSESSMENT
The patient is a 47 year old male with PMH of HTN, ? DVT (not on AC as per pt) and significant ETOH abuse resulting in cirrhosis complicated by ascites initially presented to [facility] for worsening abdominal distension, then transferred to [facility] for decompensated alcoholic cirrhosis. While here, he underwent large volume paracentesis on 08/22/2022 with 3.8L fluid removal. Fluid studies negative for SBP. Also received 7 day course of steroids. Currently awaiting repeat paracentesis pending improvement to elevated INR. Hospital course notable for epistaxis, coagulopathy and hyponatremia. Nephrology was consulted for management of hyponatremia.    -Clinically suspicious for hypervolemic hyponatremia in setting of liver cirrhosis   -Will check serum osm, urine osm, urine sodium, TSH and a.m. cortisol for completeness  -Would avoid further administration of colloids and isotonic crystalloids   -Agree with stopping salt tabs as salt promotes fluid retention   -Currently on spironolactone 100mg daily (increased from 25mg daily)   -Recommend discontinuation of potassium chloride 20meQ daily given increase in potassium sparing diuretic  -Recommend resuming lasix 40mg daily   -Continue fluid restriction of 1L per day   -Repeat paracentesis pending improvement to elevated INR  -Of note, serum sodium serves as a surrogate marker for liver cirrhosis - it is unlikely that it will go back to the normal range of 135-145meQ/L    Dotty Sloan DO  Nephrology

## 2022-09-01 DIAGNOSIS — F10.239 ALCOHOL DEPENDENCE WITH WITHDRAWAL, UNSPECIFIED: ICD-10-CM

## 2022-09-01 DIAGNOSIS — K70.40 ALCOHOLIC HEPATIC FAILURE WITHOUT COMA: ICD-10-CM

## 2022-09-01 DIAGNOSIS — E87.6 HYPOKALEMIA: ICD-10-CM

## 2022-09-01 DIAGNOSIS — E87.1 HYPO-OSMOLALITY AND HYPONATREMIA: ICD-10-CM

## 2022-09-01 NOTE — PROGRESS NOTE ADULT - ASSESSMENT
47y/oM PMH EtOH abuse, cirrhosis admitted for decompensated alcoholic cirrhosis, had episode of epistaxis , now resolved. plan for repeat IR guided paracentensis prior to dc once INR < 2     >Decompensated alcoholic cirrhosis w/ascites with EtOH abuse   -WBC increasing likely 2/2 steroids , dcd 8/29 after 7 days as per gi   -Afebrile  -cont cipro 500mg qd indefinitely for ppx for SBP due to low total BF protein   -cont lactulose, rifaximin   - bcxs 8/26 neg x 2  - peritoneal cxs 8/22 neg x 1   -MRCP done showed cirrhosis with portal HTN and large ascites  - IR consulted  for repeat therapeutic paracentesis , unable to do repeat paracentesis at thsi time as INR remains high , need INR <2.0 for procedure   - iv vit k x 2 doses   - cryoprecipitate 1 unit ordered 8/31   - follow up op with hepatology  after dc     > epistaxis / coagulopathy , likely due to liver failure   - dc heparin sq for dvt ppx , scds only   - s/p 1unit ffp , cryoprecipitate , s/p po vit k, on iv vit k , repeat ffp   - monitor for bleeding   - heme consulted , recommend to Check fibrinogen, if bleeding and  if platelets <50K - can transfuse 1-2 units platelets.  Also can give cryoprecipitate over FFP due to lesser volume.  transfuse platelets prior to planned procedures if plts <50K.    >Macrocytic anemia   - likely due to etoh / liver dz   - monitor h/h     >Hyponatremia / likely due to liver dz   -suspect chronic in setting of EtOH abuse and diuretics use  -s/p albumin 100mg once  -PO fluid restriction 1L daily  - dcd salt tabs as it could worsen ascites   - nephro consulted     >dvt ppx: scds   dispo: monitor coags , bleeding , pallaitive consulted , poor prognosis ,  trend coags on iv vit k , ffp . plan for possible dc in am , sw/ cm for dc planning

## 2022-09-01 NOTE — PROGRESS NOTE ADULT - SUBJECTIVE AND OBJECTIVE BOX
Chief Complaint:  This is a 47y old man patient being seen in follow-up consultation for advanced Cirrhosis and ascites.      Interval Events / Subjective: Patient seen and evaluated at bedside, reporting no complaints, no overnight events. No apparent changes to mental status. LFTs slightly trending down. Denies nausea, vomiting, abdominal pain, chest pain, shortness of breath, hematemesis, hematochezia, melena.       REVIEW OF SYSTEMS:   General: Negative  HEENT: Negative  CV: Negative  Respiratory: Negative  GI: See HPI  : Negative  MSK: Negative  Hematologic: Negative  Skin: Negative    MEDICATIONS:   MEDICATIONS  (STANDING):  Biotene Dry Mouth Oral Rinse 5 milliLiter(s) Swish and Spit every 4 hours  ciprofloxacin     Tablet 500 milliGRAM(s) Oral daily  folic acid 1 milliGRAM(s) Oral daily  lactulose Syrup 10 Gram(s) Oral four times a day  lidocaine   4% Patch 1 Patch Transdermal every 24 hours  pantoprazole    Tablet 40 milliGRAM(s) Oral before breakfast  phytonadione  IVPB 10 milliGRAM(s) IV Intermittent daily  rifAXIMin 550 milliGRAM(s) Oral two times a day  simethicone 80 milliGRAM(s) Chew daily  thiamine 100 milliGRAM(s) Oral daily    MEDICATIONS  (PRN):  aluminum hydroxide/magnesium hydroxide/simethicone Suspension 30 milliLiter(s) Oral every 4 hours PRN Dyspepsia  melatonin 3 milliGRAM(s) Oral at bedtime PRN Insomnia  ondansetron Injectable 4 milliGRAM(s) IV Push every 8 hours PRN Nausea and/or Vomiting      ALLERGIES:   Allergies    No Known Allergies    Intolerances        VITAL SIGNS:   Vital Signs Last 24 Hrs  T(C): 36.5 (01 Sep 2022 08:57), Max: 38.1 (31 Aug 2022 19:50)  T(F): 97.7 (01 Sep 2022 08:57), Max: 100.5 (31 Aug 2022 19:50)  HR: 65 (01 Sep 2022 08:57) (65 - 100)  BP: 101/52 (01 Sep 2022 08:57) (92/49 - 139/76)  BP(mean): --  RR: 18 (01 Sep 2022 08:57) (17 - 18)  SpO2: 99% (01 Sep 2022 08:57) (98% - 100%)    Parameters below as of 01 Sep 2022 08:57  Patient On (Oxygen Delivery Method): room air      I&O's Summary      PHYSICAL EXAM:   GENERAL:  No acute distress  HEENT:  NC/AT,  conjunctivae clear, sclera -icteric  CHEST:  Full & symmetric excursion, no increased effort, breath sounds clear  HEART:  Regular rhythm, S1, S2, no murmur/rub/S3/S4,  no edema  ABDOMEN:  +ascites, Soft, non-tender, non-distended, normoactive bowel sounds,  no rebound or guarding  EXTREMITIES: No cyanosis, clubbing or edema  SKIN:  Jaundice, No rash/erythema/ecchymoses/petechiae/wounds/abscess/warm/dry  NEURO: calm, cooperative    LABS:  CBC Full  -  ( 01 Sep 2022 06:23 )  WBC Count : 13.80 K/uL  RBC Count : 2.66 M/uL  Hemoglobin : 9.5 g/dL  Hematocrit : 27.9 %  Platelet Count - Automated : 53 K/uL  Mean Cell Volume : 104.9 fl  Mean Cell Hemoglobin : 35.7 pg  Mean Cell Hemoglobin Concentration : 34.1 gm/dL  Auto Neutrophil # : x  Auto Lymphocyte # : x  Auto Monocyte # : x  Auto Eosinophil # : x  Auto Basophil # : x  Auto Neutrophil % : x  Auto Lymphocyte % : x  Auto Monocyte % : x  Auto Eosinophil % : x  Auto Basophil % : x    09-01    128<L>  |  98  |  44.4<H>  ----------------------------<  118<H>  4.6   |  17.0<L>  |  1.00    Ca    7.8<L>      01 Sep 2022 06:23  Mg     1.9     08-31    TPro  5.2<L>  /  Alb  2.3<L>  /  TBili  31.5<H>  /  DBili  x   /  AST  162<H>  /  ALT  113<H>  /  AlkPhos  259<H>  09-01    LIVER FUNCTIONS - ( 01 Sep 2022 06:23 )  Alb: 2.3 g/dL / Pro: 5.2 g/dL / ALK PHOS: 259 U/L / ALT: 113 U/L / AST: 162 U/L / GGT: x           PT/INR - ( 01 Sep 2022 06:23 )   PT: 26.4 sec;   INR: 2.26 ratio                 RADIOLOGY & ADDITIONAL STUDIES (The following images were personally reviewed):         Chief Complaint:  This is a 47y old man patient being seen in follow-up consultation for advanced Cirrhosis and ascites.      Interval Events / Subjective: Patient seen and evaluated at bedside, reporting no complaints, no overnight events. No apparent changes to mental status. LFTs essentially unchanged.  Denies nausea, vomiting, abdominal pain, chest pain, shortness of breath, hematemesis, hematochezia, melena.       REVIEW OF SYSTEMS:   General: Negative  HEENT: Negative  CV: Negative  Respiratory: Negative  GI: See HPI  : Negative  MSK: Negative  Hematologic: Negative  Skin: Negative    MEDICATIONS:   MEDICATIONS  (STANDING):  Biotene Dry Mouth Oral Rinse 5 milliLiter(s) Swish and Spit every 4 hours  ciprofloxacin     Tablet 500 milliGRAM(s) Oral daily  folic acid 1 milliGRAM(s) Oral daily  lactulose Syrup 10 Gram(s) Oral four times a day  lidocaine   4% Patch 1 Patch Transdermal every 24 hours  pantoprazole    Tablet 40 milliGRAM(s) Oral before breakfast  phytonadione  IVPB 10 milliGRAM(s) IV Intermittent daily  rifAXIMin 550 milliGRAM(s) Oral two times a day  simethicone 80 milliGRAM(s) Chew daily  thiamine 100 milliGRAM(s) Oral daily    MEDICATIONS  (PRN):  aluminum hydroxide/magnesium hydroxide/simethicone Suspension 30 milliLiter(s) Oral every 4 hours PRN Dyspepsia  melatonin 3 milliGRAM(s) Oral at bedtime PRN Insomnia  ondansetron Injectable 4 milliGRAM(s) IV Push every 8 hours PRN Nausea and/or Vomiting      ALLERGIES:   Allergies    No Known Allergies    Intolerances        VITAL SIGNS:   Vital Signs Last 24 Hrs  T(C): 36.5 (01 Sep 2022 08:57), Max: 38.1 (31 Aug 2022 19:50)  T(F): 97.7 (01 Sep 2022 08:57), Max: 100.5 (31 Aug 2022 19:50)  HR: 65 (01 Sep 2022 08:57) (65 - 100)  BP: 101/52 (01 Sep 2022 08:57) (92/49 - 139/76)  BP(mean): --  RR: 18 (01 Sep 2022 08:57) (17 - 18)  SpO2: 99% (01 Sep 2022 08:57) (98% - 100%)    Parameters below as of 01 Sep 2022 08:57  Patient On (Oxygen Delivery Method): room air      I&O's Summary      PHYSICAL EXAM:   GENERAL:  No acute distress  HEENT:  NC/AT,  conjunctivae clear, sclera -icteric  CHEST:  Full & symmetric excursion, no increased effort, breath sounds clear  HEART:  Regular rhythm, S1, S2, no murmur/rub/S3/S4,  no edema  ABDOMEN:  +ascites, Soft, non-tender, non-distended, normoactive bowel sounds,  no rebound or guarding  EXTREMITIES: No cyanosis, clubbing or edema  SKIN:  Jaundice, No rash/erythema/ecchymoses/petechiae/wounds/abscess/warm/dry  NEURO: calm, cooperative    LABS:  CBC Full  -  ( 01 Sep 2022 06:23 )  WBC Count : 13.80 K/uL  RBC Count : 2.66 M/uL  Hemoglobin : 9.5 g/dL  Hematocrit : 27.9 %  Platelet Count - Automated : 53 K/uL  Mean Cell Volume : 104.9 fl  Mean Cell Hemoglobin : 35.7 pg  Mean Cell Hemoglobin Concentration : 34.1 gm/dL  Auto Neutrophil # : x  Auto Lymphocyte # : x  Auto Monocyte # : x  Auto Eosinophil # : x  Auto Basophil # : x  Auto Neutrophil % : x  Auto Lymphocyte % : x  Auto Monocyte % : x  Auto Eosinophil % : x  Auto Basophil % : x    09-01    128<L>  |  98  |  44.4<H>  ----------------------------<  118<H>  4.6   |  17.0<L>  |  1.00    Ca    7.8<L>      01 Sep 2022 06:23  Mg     1.9     08-31    TPro  5.2<L>  /  Alb  2.3<L>  /  TBili  31.5<H>  /  DBili  x   /  AST  162<H>  /  ALT  113<H>  /  AlkPhos  259<H>  09-01    LIVER FUNCTIONS - ( 01 Sep 2022 06:23 )  Alb: 2.3 g/dL / Pro: 5.2 g/dL / ALK PHOS: 259 U/L / ALT: 113 U/L / AST: 162 U/L / GGT: x           PT/INR - ( 01 Sep 2022 06:23 )   PT: 26.4 sec;   INR: 2.26 ratio

## 2022-09-01 NOTE — PROGRESS NOTE ADULT - PROBLEM SELECTOR PROBLEM 1
Alcoholic cirrhosis of liver

## 2022-09-01 NOTE — PROGRESS NOTE ADULT - SUBJECTIVE AND OBJECTIVE BOX
cc: liver failure ,    interval hx: patient seen and eval.  [place]man[place]   used. no new bleeding. no fever or chills. denies any n/v. abd distended but soft , no tenderness noted     Vital Signs Last 24 Hrs  T(C): 36.5 (01 Sep 2022 08:57), Max: 38.1 (31 Aug 2022 19:50)  T(F): 97.7 (01 Sep 2022 08:57), Max: 100.5 (31 Aug 2022 19:50)  HR: 65 (01 Sep 2022 08:57) (65 - 100)  BP: 101/52 (01 Sep 2022 08:57) (92/49 - 139/76)  BP(mean): --  RR: 18 (01 Sep 2022 08:57) (17 - 18)  SpO2: 99% (01 Sep 2022 08:57) (98% - 100%)    Parameters below as of 01 Sep 2022 08:57  Patient On (Oxygen Delivery Method): room air        PHYSICAL EXAM:  Constitutional: NAD,   HEENT: bilateral deep  icterus , mild nose bleed , dried   Neck: Soft and supple   Respiratory: Breath sounds are clear bilaterally,   Cardiovascular: S1 and S2,    Gastrointestinal: Bowel Sounds present, soft, nontender,  Marked distension, no guarding, no rebound  Extremities: No peripheral edema  Vascular: 2+ peripheral pulses  Neurological: A/O x 3, no focal deficits  Musculoskeletal: 5/5 strength b/l upper and lower extremities  Skin: No rashes      MEDICATIONS  (STANDING):  Biotene Dry Mouth Oral Rinse 5 milliLiter(s) Swish and Spit every 4 hours  ciprofloxacin     Tablet 500 milliGRAM(s) Oral daily  folic acid 1 milliGRAM(s) Oral daily  lactulose Syrup 10 Gram(s) Oral four times a day  lidocaine   4% Patch 1 Patch Transdermal every 24 hours  pantoprazole    Tablet 40 milliGRAM(s) Oral before breakfast  phytonadione  IVPB 10 milliGRAM(s) IV Intermittent daily  rifAXIMin 550 milliGRAM(s) Oral two times a day  simethicone 80 milliGRAM(s) Chew daily  thiamine 100 milliGRAM(s) Oral daily    MEDICATIONS  (PRN):  aluminum hydroxide/magnesium hydroxide/simethicone Suspension 30 milliLiter(s) Oral every 4 hours PRN Dyspepsia  melatonin 3 milliGRAM(s) Oral at bedtime PRN Insomnia  ondansetron Injectable 4 milliGRAM(s) IV Push every 8 hours PRN Nausea and/or Vomiting                            9.5    13.80 )-----------( 53       ( 01 Sep 2022 06:23 )             27.9   09-01    128<L>  |  98  |  44.4<H>  ----------------------------<  118<H>  4.6   |  17.0<L>  |  1.00    Ca    7.8<L>      01 Sep 2022 06:23  Mg     1.9     08-31    TPro  5.2<L>  /  Alb  2.3<L>  /  TBili  31.5<H>  /  DBili  x   /  AST  162<H>  /  ALT  113<H>  /  AlkPhos  259<H>  09-01      PT/INR - ( 01 Sep 2022 06:23 )   PT: 26.4 sec;   INR: 2.26 ratio

## 2022-09-01 NOTE — PROGRESS NOTE ADULT - ASSESSMENT
The patient is a 47 year old male with PMH of HTN, ? DVT (not on AC as per pt) and significant ETOH abuse resulting in cirrhosis complicated by ascites initially presented to [facility] for worsening abdominal distension; transferred to [facility] for decompensated alcoholic cirrhosis. While here, he underwent large volume paracentesis on 08/22/2022 with 3.8L fluid removal. Fluid studies negative for SBP. Repeat paracentesis on 09/01/2022 with 2.9L fluid removal. Also received 7 day course of steroids. Hospital course notable for epistaxis, coagulopathy and hyponatremia. Now with LASHONDA.    Hyponatremia   -Clinically suspicious for hypervolemic hyponatremia in setting of liver cirrhosis   -Awaiting serum osm, urine osm, urine sodium, TSH and a.m. cortisol   -While the patient is hypervolemic, he is also intravascularly depleted - BP quite soft - will discontinue spironolactone and oral lasix at this time (see below)  -Continue 1L fluid restriction    -Of note, serum sodium serves as a surrogate marker for liver cirrhosis - it is unlikely that it will go back to the normal range of 135-145meQ/L    Acute Kidney Injury   -Suspecting from decreased effective circulating volume; hepatorenal syndrome while possible, it is a diagnosis of exclusion   -Baseline creatinine is < 0.2mg/dL  -Creatinine today is 1.0 (which is greater than 1.5x increase from baseline creatinine) - consistent with LASHONDA  -UA negative blood, +protein  -UPCR 0.9g/g; UACR pending   -Urine sodium < 30  -Renal ultrasound is pending at this time   -Will give 1u of salt poor albumin today  -No indication for renal replacement therapy at this time but at risk of needing it if no improvement to renal function     Soft Blood Pressure   Low Grade Fever   -BP soft; Tmax 100.9F  -Recommend sepsis work up (would defer to primary team)     Dotty Sloan,   Nephrology    The patient is a 47 year old male with PMH of HTN, ? DVT (not on AC as per pt) and significant ETOH abuse resulting in cirrhosis complicated by ascites initially presented to [facility] for worsening abdominal distension; transferred to [facility] for decompensated alcoholic cirrhosis. While here, he underwent large volume paracentesis on 08/22/2022 with 3.8L fluid removal. Fluid studies negative for SBP. Repeat paracentesis on 09/01/2022 with 2.9L fluid removal. Also received 7 day course of steroids. Hospital course notable for epistaxis, coagulopathy and hyponatremia. Now with LASHONDA.    Hyponatremia   -Clinically suspicious for hypervolemic hyponatremia in setting of liver cirrhosis   -Awaiting serum osm, urine osm, urine sodium, TSH and a.m. cortisol   -While the patient is hypervolemic, he is also intravascularly depleted - BP quite soft - will discontinue spironolactone and oral lasix at this time (see below)  -Continue 1L fluid restriction    -Of note, serum sodium serves as a surrogate marker for liver cirrhosis - it is unlikely that it will go back to the normal range of 135-145meQ/L    Acute Kidney Injury   -Suspecting from decreased effective circulating volume; hepatorenal syndrome while possible, it is a diagnosis of exclusion   -Baseline creatinine is < 0.2mg/dL  -Creatinine today is 1.0 (which is greater than 1.5x increase from baseline creatinine) - consistent with LASHONDA  -UA negative blood, +protein  -UPCR 0.9g/g; UACR pending   -Urine sodium < 30  -Renal ultrasound is pending at this time   -Will give 1u of salt poor albumin today  -No indication for renal replacement therapy at this time but at risk of needing it if no improvement to renal function     Soft Blood Pressure   Low Grade Fever   -BP soft; Tmax 100.5F  -Recommend sepsis work up (would defer to primary team)     Dotty Sloan,   Nephrology

## 2022-09-01 NOTE — PROGRESS NOTE ADULT - PROBLEM SELECTOR PLAN 1
Decompensated alcoholic cirrhosis +ascites, encephalopathy. LFTs slightly improving. Last paracentesis 8/22 with removal of 3860cc of fluid, negative for SBP, negative cultures.   Worsening ascites, therapeutic paracentesis when able, (elevated INR at 2.26, athrombocytopenic)  Continue IV Vitamin K 10mg as ordered   Continue Rifaximin and lactulose   Continue Aldactone 100 mg daily;  Hyponatremia- Na at 128. Continue to hold off Lasix at this time.   Monitor renal function, electrolytes, coags  Continue Ciprofloxacin for SBP prophylaxis.    Continue MVI. thiamine, folic acid  Continue Protonix  Continue ETOH abstinence. Decompensated alcoholic cirrhosis +ascites, encephalopathy. LFTs slightly improving. Last paracentesis 8/22 with removal of 3860cc of fluid, negative for SBP, negative cultures.   Worsening ascites, IR consulted for therapeutic paracentesis when able, (elevated INR at 2.26) need INR <2.0 for procedure. Continue IV Vitamin K 10mg as ordered.   Continue Rifaximin and lactulose   Continue Aldactone 100 mg daily;  Hyponatremia- Na at 128. Continue to hold off Lasix at this time.   Monitor renal function, electrolytes, coags  Continue Ciprofloxacin for SBP prophylaxis.    Continue MVI. thiamine, folic acid  Continue Protonix  Continue ETOH abstinence.

## 2022-09-01 NOTE — CHART NOTE - NSCHARTNOTEFT_GEN_A_CORE
RN notified pt with BP 92/50 manual.  Pt is asymptomatic and mentating well.   Denies pain, dizziness, LOC, syncope, blurry vision, SOB, abd pain.     Vital Signs Last 24 Hrs  T(C): 36.8 (01 Sep 2022 04:09), Max: 38.1 (31 Aug 2022 19:50)  T(F): 98.3 (01 Sep 2022 04:09), Max: 100.5 (31 Aug 2022 19:50)  HR: 76 (01 Sep 2022 04:09) (71 - 100)  BP: 92/50 (01 Sep 2022 05:40) (92/49 - 139/76)  RR: 17 (01 Sep 2022 04:09) (17 - 18)  SpO2: 98% (01 Sep 2022 04:09) (98% - 100%)    Parameters below as of 01 Sep 2022 04:09  Patient On (Oxygen Delivery Method): room air    Plan  repeat BP in 1 hour to monitor  Pt fluid restricted, 1000ml -- continue to monitor for need for fluid bolus  continue to monitor pt status  RN to notify of acute changes in pt   Will endorse to dayteam to f/u.

## 2022-09-01 NOTE — PROGRESS NOTE ADULT - SUBJECTIVE AND OBJECTIVE BOX
Vitals notable for soft BP's and low grade fever (Tmax 100.5F). Also with LASHONDA on labs today. Of note, the patient complained of feeling feverish this a.m. Underwent paracentesis today with 2.9L fluid removal.     Vital Signs Last 24 Hrs  T(C): 36.5 (01 Sep 2022 18:45), Max: 38.1 (31 Aug 2022 19:50)  T(F): 97.7 (01 Sep 2022 18:45), Max: 100.5 (31 Aug 2022 19:50)  HR: 64 (01 Sep 2022 18:45) (64 - 100)  BP: 107/69 (01 Sep 2022 18:45) (92/49 - 139/76)  BP(mean): --  RR: 16 (01 Sep 2022 18:45) (16 - 18)  SpO2: 100% (01 Sep 2022 16:56) (98% - 100%)    Parameters below as of 01 Sep 2022 16:56  Patient On (Oxygen Delivery Method): room air    I&O's Summary: Not recorded     Physical Exam  General: Thin male in NAD; lying flat in bed  Cardiac: S1S2 RRR  Respiratory: CTAB posteriorly  Abdomen: Distended, soft, +fluid wave, nontender  Extremities: No edema  Neuro: AAOx3    09-01    128<L>  |  98  |  44.4<H>  ----------------------------<  118<H>  4.6   |  17.0<L>  |  1.00    Ca    7.8<L>      01 Sep 2022 06:23  Mg     1.9     08-31    TPro  5.2<L>  /  Alb  2.3<L>  /  TBili  31.5<H>  /  DBili  x   /  AST  162<H>  /  ALT  113<H>  /  AlkPhos  259<H>  09-01                          9.5    13.80 )-----------( 53       ( 01 Sep 2022 06:23 )             27.9     MEDICATIONS  (STANDING):  Biotene Dry Mouth Oral Rinse 5 milliLiter(s) Swish and Spit every 4 hours  ciprofloxacin     Tablet 500 milliGRAM(s) Oral daily  folic acid 1 milliGRAM(s) Oral daily  lactulose Syrup 10 Gram(s) Oral four times a day  lidocaine   4% Patch 1 Patch Transdermal every 24 hours  pantoprazole    Tablet 40 milliGRAM(s) Oral before breakfast  phytonadione  IVPB 10 milliGRAM(s) IV Intermittent daily  rifAXIMin 550 milliGRAM(s) Oral two times a day  simethicone 80 milliGRAM(s) Chew daily  thiamine 100 milliGRAM(s) Oral daily    MEDICATIONS  (PRN):  aluminum hydroxide/magnesium hydroxide/simethicone Suspension 30 milliLiter(s) Oral every 4 hours PRN Dyspepsia  melatonin 3 milliGRAM(s) Oral at bedtime PRN Insomnia  ondansetron Injectable 4 milliGRAM(s) IV Push every 8 hours PRN Nausea and/or Vomiting

## 2022-09-01 NOTE — PROGRESS NOTE ADULT - NS ATTEND AMEND GEN_ALL_CORE FT
Yesterday the patient was started on Aldactone 100 mg daily and today the creatinine continues normal at 1.0 but the BUN is slightly elevated at 44.  He has no specific complaints.  On exam he has moderate distention which is soft in nature.  His LFTs are essentially unchanged and he is markedly jaundiced.  He should continue with the current medical regimen.  Otherwise there is nothing further to offer this patient. Yesterday the patient was started on Aldactone 100 mg daily and today the creatinine continues normal at 1.0 but the BUN is slightly elevated at 44.  He has no specific complaints.  On exam he has moderate distention which is soft in nature.  His LFTs are essentially unchanged and he is markedly jaundiced.  He should continue with the current medical regimen.  The patient had continued to drink up until the time of his admission to [facility] in early August.  I highly doubt he is a transplant candidate but would recommend that he undergo further evaluation by the hepatologist in our office, Dr. Boggs. Otherwise there is nothing further to offer this patient.

## 2022-09-02 DIAGNOSIS — E87.6 HYPOKALEMIA: ICD-10-CM

## 2022-09-02 DIAGNOSIS — K70.40 ALCOHOLIC HEPATIC FAILURE WITHOUT COMA: ICD-10-CM

## 2022-09-02 DIAGNOSIS — E87.1 HYPO-OSMOLALITY AND HYPONATREMIA: ICD-10-CM

## 2022-09-02 DIAGNOSIS — F10.239 ALCOHOL DEPENDENCE WITH WITHDRAWAL, UNSPECIFIED: ICD-10-CM

## 2022-09-02 NOTE — PROGRESS NOTE ADULT - ASSESSMENT
The patient is a 47 year old male with PMH of HTN, ? DVT (not on AC as per pt) and significant ETOH abuse resulting in cirrhosis complicated by ascites initially presented to [facility] for worsening abdominal distension; transferred to [facility] for decompensated alcoholic cirrhosis. While here, he underwent large volume paracentesis on 08/22/2022 with 3.8L fluid removal. Fluid studies negative for SBP. Had second paracentesis on 09/01/2022 with 2.9L fluid removal. Also received 7 day course of steroids. Hospital course notable for epistaxis, coagulopathy, hyponatremia and LASHONDA.    Hyponatremia   -Serum osm is WNL at 300   -Will check lipid panel, SPEP, immunofixation and kappa lambda light chain     Acute Kidney Injury, improving   -Suspecting decreased effective circulating volume; hepatorenal syndrome (while possible) is a diagnosis of exclusion   -Baseline creatinine is < 0.2mg/dL  -Creatinine peaked at 1.0mg/dL yesterday; received 1u salt poor albumin and renal function improved to creatinine 0.5mg/dL today   -UA negative blood, +protein  -UPCR 0.9g/g; UACR pending   -Urine sodium < 30  -Renal ultrasound - R kidney 12.9cm + L kidney 14.8cm - no obstruction    -Continue to hold diuretics at this time     Blood Pressure  -Improved     Liver Cirrhosis  Abdominal Ascites  -s/p paracentesis with 2.9L fluid removal yesterday   -Would continue to hold spironolactone and lasix at this time       Dotty Sloan, DO  Nephrology

## 2022-09-02 NOTE — DISCHARGE NOTE PROVIDER - PROVIDER TOKENS
PROVIDER:[TOKEN:[[id number]],FOLLOWUP:[1 week]],PROVIDER:[TOKEN:[[id number]],FOLLOWUP:[1 week]],FREE:[LAST:[primary care],PHONE:[(   )    -],FAX:[(   )    -],FOLLOWUP:[1-3 days]]

## 2022-09-02 NOTE — PROGRESS NOTE ADULT - SUBJECTIVE AND OBJECTIVE BOX
Received 1u salt poor albumin yesterday. Cr improved from 1.0 -> 0.5mg/dL. Denied SOB, abdominal discomfort and LE edema. Complained of having nightmares overnight as well as talking during his sleep. Communicated with the patient through [employer]  #[id number].     Vital Signs Last 24 Hrs  T(C): 36.6 (02 Sep 2022 10:36), Max: 36.6 (01 Sep 2022 17:35)  T(F): 97.9 (02 Sep 2022 10:36), Max: 97.9 (01 Sep 2022 17:35)  HR: 89 (02 Sep 2022 10:36) (63 - 89)  BP: 114/68 (02 Sep 2022 10:36) (107/69 - 118/69)  BP(mean): --  RR: 18 (02 Sep 2022 10:36) (16 - 18)  SpO2: 99% (02 Sep 2022 10:36) (98% - 100%)    Parameters below as of 02 Sep 2022 10:36  Patient On (Oxygen Delivery Method): room air    I&O's Summary  01 Sep 2022 07:01  -  02 Sep 2022 07:00  --------------------------------------------------------  IN: 202 mL / OUT: 0 mL / NET: 202 mL    Physical Exam  General: Thin male in NAD; lying flat in bed  Cardiac: S1S2 RRR  Respiratory: CTAB posteriorly  Abdomen: Distended, soft, +fluid wave, nontender  Extremities: No edema  Neuro: AAOx3    09-02    129<L>  |  99  |  51.6<H>  ----------------------------<  121<H>  4.1   |  18.0<L>  |  0.57    Ca    8.0<L>      02 Sep 2022 05:35  Mg     2.2     09-02    TPro  5.4<L>  /  Alb  2.6<L>  /  TBili  33.1<H>  /  DBili  x   /  AST  116<H>  /  ALT  108<H>  /  AlkPhos  284<H>  09-02               10.5   12.06 )-----------( 52       ( 02 Sep 2022 05:35 )             30.6     MEDICATIONS  (STANDING):  Biotene Dry Mouth Oral Rinse 5 milliLiter(s) Swish and Spit every 4 hours  ciprofloxacin     Tablet 500 milliGRAM(s) Oral daily  folic acid 1 milliGRAM(s) Oral daily  lactulose Syrup 10 Gram(s) Oral four times a day  lidocaine   4% Patch 1 Patch Transdermal every 24 hours  pantoprazole    Tablet 40 milliGRAM(s) Oral before breakfast  rifAXIMin 550 milliGRAM(s) Oral two times a day  simethicone 80 milliGRAM(s) Chew daily  thiamine 100 milliGRAM(s) Oral daily    MEDICATIONS  (PRN):  aluminum hydroxide/magnesium hydroxide/simethicone Suspension 30 milliLiter(s) Oral every 4 hours PRN Dyspepsia  melatonin 3 milliGRAM(s) Oral at bedtime PRN Insomnia  ondansetron Injectable 4 milliGRAM(s) IV Push every 8 hours PRN Nausea and/or Vomiting

## 2022-09-02 NOTE — PROGRESS NOTE ADULT - SUBJECTIVE AND OBJECTIVE BOX
cc: liver failure ,    interval hx: patient seen and eval.  [place]   used. s/p 2900 cc fluid removal by IR 9/1.   noted to have bp on lower side. lasix and spironolactone held . also low grade temp tmax of 100.4 yesterday. no fever today.   denies any n/v/abd pain   denies any dizziness       Vital Signs Last 24 Hrs  T(C): 36.6 (02 Sep 2022 10:36), Max: 36.6 (01 Sep 2022 17:35)  T(F): 97.9 (02 Sep 2022 10:36), Max: 97.9 (01 Sep 2022 17:35)  HR: 89 (02 Sep 2022 10:36) (63 - 89)  BP: 114/68 (02 Sep 2022 10:36) (107/69 - 118/69)  BP(mean): --  RR: 18 (02 Sep 2022 10:36) (16 - 18)  SpO2: 99% (02 Sep 2022 10:36) (98% - 100%)    Parameters below as of 02 Sep 2022 10:36  Patient On (Oxygen Delivery Method): room air          PHYSICAL EXAM:  Constitutional: NAD,   HEENT: bilateral deep  icterus , mild nose bleed , dried   Neck: Soft and supple   Respiratory: Breath sounds are clear bilaterally,   Cardiovascular: S1 and S2,    Gastrointestinal: Bowel Sounds present, soft, nontender,  Marked distension, no guarding, no rebound  Extremities: No peripheral edema  Vascular: 2+ peripheral pulses  Neurological: A/O x 3, no focal deficits  Musculoskeletal: 5/5 strength b/l upper and lower extremities  Skin: No rashes      MEDICATIONS  (STANDING):  Biotene Dry Mouth Oral Rinse 5 milliLiter(s) Swish and Spit every 4 hours  ciprofloxacin     Tablet 500 milliGRAM(s) Oral daily  folic acid 1 milliGRAM(s) Oral daily  lactulose Syrup 10 Gram(s) Oral four times a day  lidocaine   4% Patch 1 Patch Transdermal every 24 hours  pantoprazole    Tablet 40 milliGRAM(s) Oral before breakfast  rifAXIMin 550 milliGRAM(s) Oral two times a day  simethicone 80 milliGRAM(s) Chew daily  thiamine 100 milliGRAM(s) Oral daily    MEDICATIONS  (PRN):  aluminum hydroxide/magnesium hydroxide/simethicone Suspension 30 milliLiter(s) Oral every 4 hours PRN Dyspepsia  melatonin 3 milliGRAM(s) Oral at bedtime PRN Insomnia  ondansetron Injectable 4 milliGRAM(s) IV Push every 8 hours PRN Nausea and/or Vomiting                            10.5   12.06 )-----------( 52       ( 02 Sep 2022 05:35 )             30.6   09-02    129<L>  |  99  |  51.6<H>  ----------------------------<  121<H>  4.1   |  18.0<L>  |  0.57    Ca    8.0<L>      02 Sep 2022 05:35  Mg     2.2     09-02    TPro  5.4<L>  /  Alb  2.6<L>  /  TBili  33.1<H>  /  DBili  x   /  AST  116<H>  /  ALT  108<H>  /  AlkPhos  284<H>  09-02

## 2022-09-02 NOTE — PROGRESS NOTE ADULT - ASSESSMENT
47y/oM PMH EtOH abuse, cirrhosis admitted for decompensated alcoholic cirrhosis, coagulopathy , had episode of epistaxis , now resolved.  started on spironolactone , lasix , cipro for sbp ppx , rifaximin , s/p 2900 cc fluid removal by IR 9/1.   noted to have bp on lower side. lasix and spironolactone held . also low grade temp tmax of 100.4 yesterday. no fever today. monitor for 24-48 hrs , if bp stable and no further episodes of fever can be dcd home pending nephro clearance. needs op hepatology  follow.       >Decompensated alcoholic cirrhosis w/ascites with EtOH abuse   -WBC initially  increasing likely 2/2 steroids , dcd 8/29 after 7 days as per gi   - bcxs 8/26 neg x 2  - peritoneal cxs 8/22 neg x 1   - MRCP done showed cirrhosis with portal HTN and large ascites  -s/p IR guided repeat  paracenteses 9/1   - follow up op with hepatology  after dc   -cont lactulose, rifaximin   - 9/1 hold lasix , spironolactone due to hypotension , can resume at lower dose prior to dc if bp stable   -cont cipro 500mg qd indefinitely for ppx for SBP due to low total BF protein   - s/p ceftriaxone on admission , now dcd   - low grade fever 100.4 x 1 episode 9/1 , no further episodes , wbcs trending down , monitor       > epistaxis / coagulopathy , likely due to liver failure   - dcd heparin sq for dvt ppx , scds only   - s/p  cryoprecipitate , s/p po /iv  vit k , ffp. not much improved   - no further bleeding , monitor new episodes   - heme consulted , recommend to Check fibrinogen, if bleeding and  if platelets <50K - can transfuse 1-2 units platelets.  Also can give cryoprecipitate over FFP due to lesser volume.  transfuse platelets prior to planned procedures if plts <50K.    >Macrocytic anemia   - likely due to etoh / liver dz   - monitor h/h     >Hyponatremia / likely due to liver dz   -suspect chronic in setting of EtOH abuse and diuretics use  -s/p albumin 100mg once  -PO fluid restriction 1L daily  - dcd salt tabs as it could worsen ascites   -Renal ultrasound  no obstruction    -Continue to hold diuretics at this time       >dvt ppx: scds   dispo: , sw/ cm for dc planning , monitor for 24-48 hrs , if bp stable and no further episodes of fever can be dcd home pending nephro clearance. needs op hepatology  follow.      47y/oM PMH EtOH abuse, cirrhosis admitted for decompensated alcoholic cirrhosis, coagulopathy , had episode of epistaxis , now resolved.  started on spironolactone , lasix , cipro for sbp ppx , rifaximin , s/p 2900 cc fluid removal by IR 9/1.   noted to have bp on lower side. lasix and spironolactone held . also low grade temp tmax of 100.4 yesterday. no fever today. monitor for 24-48 hrs , if bp stable and no further episodes of fever can be dcd home pending nephro clearance. needs op hepatology  follow.       >Decompensated alcoholic cirrhosis w/ascites with EtOH abuse   -WBC initially  increasing likely 2/2 steroids , dcd 8/29 after 7 days as per gi   - bcxs 8/26 neg x 2  - peritoneal cxs 8/22 neg x 1   - MRCP done showed cirrhosis with portal HTN and large ascites  -s/p IR guided repeat  paracenteses 9/1   - follow up op with hepatology  after dc   -cont lactulose, rifaximin   - 9/1 hold lasix , spironolactone due to hypotension , can resume at lower dose prior to dc if bp stable   -cont cipro 500mg qd indefinitely for ppx for SBP due to low total BF protein   - s/p ceftriaxone on admission , now dcd   - low grade fever 100.4 x 1 episode 9/1 , no further episodes , wbcs trending down , monitor       > epistaxis / coagulopathy , likely due to liver failure   - dcd heparin sq for dvt ppx , scds only   - s/p  cryoprecipitate , s/p po /iv  vit k , ffp. not much improved   - no further bleeding , monitor new episodes   - heme consulted , recommend to Check fibrinogen, if bleeding and  if platelets <50K - can transfuse 1-2 units platelets.  Also can give cryoprecipitate over FFP due to lesser volume.  transfuse platelets prior to planned procedures if plts <50K.    >Macrocytic anemia   - likely due to etoh / liver dz   - monitor h/h     >Hyponatremia / likely due to liver dz   -suspect chronic in setting of EtOH abuse and diuretics use  -s/p albumin 100mg once  -PO fluid restriction 1L daily  - dcd salt tabs as it could worsen ascites   -Renal ultrasound  no obstruction    -Continue to hold diuretics at this time       >dvt ppx: scds   dispo: , sw/ cm for dc planning , monitor for 24-48 hrs , if bp stable and no further episodes of fever can be dcd home pending nephro clearance. needs op hepatology  follow up. patient is undocumented. sw/cm for dc planning.

## 2022-09-02 NOTE — DISCHARGE NOTE PROVIDER - HOSPITAL COURSE
47y/oM PMH EtOH abuse, cirrhosis admitted for Decompensated alcoholic cirrhosis w/ascites with EtOH abuse . started on stroids, dcd after 7 days. WBC initially  increasing likely 2/2 steroids , dcd 8/29 after 7 days as per gi bcxs 8/26 neg x 2. peritoneal cxs 8/22 neg x 1 . MRCP done showed cirrhosis with portal HTN and large ascite. s/p IR guided repeat  paracenteses 9/1 . 9/1 held  lasix , spironolactone due to hypotension , can resume at lower dose prior to dc if bp stable . cont cipro 500mg qd indefinitely for ppx for SBP due to low total BF protein . s/p ceftriaxone on admission , now dcd . also note dto have  epistaxis / coagulopathy , likely due to liver failure  dcd heparin sq for dvt ppx , scds only ,  s/p  cryoprecipitate , s/p po /iv  vit k , ffp. not much improved . no further bleeding , monitor new episodes .  heme consulted , recommend to Check fibrinogen, if bleeding and  if platelets <50K - can transfuse 1-2 units platelets.  Also can give cryoprecipitate over FFP due to lesser volume.  transfuse platelets prior to planned procedures if plts <50K.  noted to have Hyponatremia / likely due to liver dz suspect chronic in setting of EtOH abuse and diuretics use, s/p albumin 100mg once, PO fluid restriction 1L daily, was started on salt tabs ,m now dcd salt tabs as it could worsen ascites . Renal ultrasound  no obstruction  . Continue to hold diuretics at this time   low grade fever 100.4 x 1 episode 9/1 , no further episodes , wbcs trending down , monitor .     dispo: , sw/ cm for dc planning , monitor for 24-48 hrs , if bp stable and no further episodes of fever can be dcd home pending nephro clearance. needs op hepatology  follow.

## 2022-09-02 NOTE — DISCHARGE NOTE PROVIDER - CARE PROVIDER_API CALL
Brenda Boggs)  Internal Medicine  [address] [place] [id number]  Phone: [phone]  Fax: [phone]  Follow Up Time: 1 week    Christine Griffin)  Gastroenterology  [address] [place] [id number]  Phone: [phone]  Fax: [phone]  Follow Up Time: 1 week    primary care,   Phone: (   )    -  Fax: (   )    -  Follow Up Time: 1-3 days

## 2022-09-02 NOTE — DISCHARGE NOTE PROVIDER - NSDCMRMEDTOKEN_GEN_ALL_CORE_FT
folic acid 1 mg oral tablet: 1 tab(s) orally once a day  lactulose 10 g/15 mL oral syrup: 15 milliliter(s) orally 2 times a day  Potassium Chloride (Eqv-Klor-Con 10) 10 mEq oral tablet, extended release: 1 tab(s) orally 2 times a day  Slow Magnesium Chloride with Calcium 71 mg-118 mg oral delayed release tablet: 2 tab(s) orally once a day

## 2022-09-02 NOTE — DISCHARGE NOTE PROVIDER - NSDCFUADDAPPT_GEN_ALL_CORE_FT
Patient has been given a prescheduled appt at the [facility] for :  Friday 8/26/22 at 10 AM   Address: [address] [place] [address]   Phone # [phone]

## 2022-09-02 NOTE — DISCHARGE NOTE PROVIDER - NSDCCPCAREPLAN_GEN_ALL_CORE_FT
PRINCIPAL DISCHARGE DIAGNOSIS  Diagnosis: End stage liver disease  Assessment and Plan of Treatment: continue with meds as advised.   follow up with gastroenterolgy and  hepatology as out patient.   avoid alcohol use      SECONDARY DISCHARGE DIAGNOSES  Diagnosis: Hyponatremia  Assessment and Plan of Treatment: follow up with nephrology as out patient   free fluid restriction 1 L daily

## 2022-09-03 NOTE — PROGRESS NOTE ADULT - ASSESSMENT
47y/oM PMH EtOH abuse, cirrhosis admitted for decompensated alcoholic cirrhosis, coagulopathy , had episode of epistaxis , now resolved.  started on spironolactone , lasix , cipro for sbp ppx , rifaximin , s/p 2900 cc fluid removal by IR 9/1.   noted to have bp on lower side. lasix and spironolactone held . also low grade temp tmax of 100.4 yesterday. no fever today. monitor for 24-48 hrs , if bp stable and no further episodes of fever can be dcd home pending nephro clearance. needs op hepatology  follow.       *Decompensated alcoholic cirrhosis w/ascites with EtOH abuse   WBC initially  increasing likely 2/2 steroids , dcd 8/29 after 7 days as per gi   bcxs 8/26 neg x 2  peritoneal cxs 8/22 neg x 1   MRCP done showed cirrhosis with portal HTN and large ascites  s/p IR guided repeat  paracenteses 9/1   follow up op with hepatology  after dc   cont lactulose, rifaximin   9/1 hold lasix , spironolactone due to hypotension , can resume at lower dose prior to dc if bp stable   cont cipro 500mg qd indefinitely for ppx for SBP due to low total BF protein   s/p ceftriaxone on admission , now dcd   low grade fever 100.4 x 1 episode 9/1 , no further episodes , wbcs trending down , monitor     *Hypotension   likely secondary to large volume paracentesis.   would cont observe   cont hold lasix and spironolactone for today     *epistaxis / coagulopathy , likely due to liver failure   dcd heparin sq for dvt ppx , scds only   s/p  cryoprecipitate , s/p po /iv  vit k , ffp. not much improved   no further bleeding , monitor new episodes   heme consulted , recommend to Check fibrinogen, if bleeding and  if platelets <50K - can transfuse 1-2 units platelets.  Also can give cryoprecipitate over FFP due to lesser volume.  transfuse platelets prior to planned procedures if plts <50K.    *Macrocytic anemia   likely due to etoh / liver dz   monitor h/h     *Hyponatremia / likely due to liver dz   suspect chronic in setting of EtOH abuse and diuretics use  s/p albumin 100mg once  PO fluid restriction 1L daily  dcd salt tabs as it could worsen ascites   Renal ultrasound  no obstruction    Continue to hold diuretics at this time   renal consult apprecaited       *dvt ppx: scds   dispo: , sw/ cm for dc planning , monitor for 24-48 hrs , if bp stable and no further episodes of fever can be dcd home pending nephro clearance. needs op hepatology  follow up. patient is undocumented. sw/cm for dc planning.

## 2022-09-03 NOTE — PROGRESS NOTE ADULT - ASSESSMENT
The patient is a 47 year old male with PMH of HTN, ? DVT (not on AC as per pt) and significant ETOH abuse resulting in cirrhosis complicated by ascites initially presented to [facility] for worsening abdominal distension; transferred to [facility] for decompensated alcoholic cirrhosis. While here, he underwent large volume paracentesis on 08/22/2022 with 3.8L fluid removal. Fluid studies negative for SBP. Had second paracentesis on 09/01/2022 with 2.9L fluid removal. Also received 7 day course of steroids. Hospital course notable for epistaxis, coagulopathy, hyponatremia and LASHONDA.    Hyponatremia   -Serum osm is WNL x 2  -Lipid panel is okay  -SPEP, immunofixation and kappa lambda light chain are pending     Acute Kidney Injury  -Suspecting decreased effective circulating volume; hepatorenal syndrome (while possible) is a diagnosis of exclusion   -Baseline creatinine is < 0.2mg/dL  -Creatinine now back up to 1mg/dL; will give another 1u salt poor albumin and start midodrine 5mg TID + octreotide 100mg subQ TID  -UA negative blood, +protein  -UPCR 0.9g/g   -Urine sodium < 30  -Renal ultrasound - R kidney 12.9cm + L kidney 14.8cm - no obstruction    -Continue to hold diuretics at this time     Blood Pressure  -Improved     Liver Cirrhosis  Abdominal Ascites  -s/p paracentesis with 2.9L fluid removal yesterday   -Would continue to hold spironolactone and lasix at this time       Dotty Sloan, DO  Nephrology

## 2022-09-03 NOTE — PROGRESS NOTE ADULT - SUBJECTIVE AND OBJECTIVE BOX
Received 1u salt poor albumin this a.m. Sleeping at the time of my assessment this evening.     Vital Signs Last 24 Hrs  T(C): 36.7 (03 Sep 2022 16:49), Max: 36.8 (03 Sep 2022 08:57)  T(F): 98 (03 Sep 2022 16:49), Max: 98.2 (03 Sep 2022 08:57)  HR: 74 (03 Sep 2022 16:49) (71 - 74)  BP: 123/66 (03 Sep 2022 16:49) (106/66 - 123/66)  BP(mean): --  RR: 18 (03 Sep 2022 16:49) (18 - 18)  SpO2: 100% (03 Sep 2022 16:49) (100% - 100%)    Parameters below as of 03 Sep 2022 16:49  Patient On (Oxygen Delivery Method): room air    I&O's Summary  02 Sep 2022 07:01  -  03 Sep 2022 07:00  --------------------------------------------------------  IN: 220 mL / OUT: 0 mL / NET: 220 mL    Physical Exam  General: Thin male in NAD; lying flat in bed, on room air  Neuro: Sleeping     09-03    129<L>  |  99  |  49.7<H>  ----------------------------<  129<H>  3.6   |  17.0<L>  |  1.08    Ca    7.7<L>      03 Sep 2022 05:05  Mg     2.2     09-02    TPro  5.1<L>  /  Alb  2.2<L>  /  TBili  32.0<H>  /  DBili  x   /  AST  104<H>  /  ALT  94<H>  /  AlkPhos  279<H>  09-03               9.4    13.62 )-----------( 55       ( 03 Sep 2022 05:05 )             26.8     MEDICATIONS  (STANDING):  Biotene Dry Mouth Oral Rinse 5 milliLiter(s) Swish and Spit every 4 hours  ciprofloxacin     Tablet 500 milliGRAM(s) Oral daily  folic acid 1 milliGRAM(s) Oral daily  lactulose Syrup 10 Gram(s) Oral four times a day  lidocaine   4% Patch 1 Patch Transdermal every 24 hours  pantoprazole    Tablet 40 milliGRAM(s) Oral before breakfast  rifAXIMin 550 milliGRAM(s) Oral two times a day  simethicone 80 milliGRAM(s) Chew daily  thiamine 100 milliGRAM(s) Oral daily    MEDICATIONS  (PRN):  aluminum hydroxide/magnesium hydroxide/simethicone Suspension 30 milliLiter(s) Oral every 4 hours PRN Dyspepsia  melatonin 3 milliGRAM(s) Oral at bedtime PRN Insomnia  ondansetron Injectable 4 milliGRAM(s) IV Push every 8 hours PRN Nausea and/or Vomiting

## 2022-09-03 NOTE — PROGRESS NOTE ADULT - SUBJECTIVE AND OBJECTIVE BOX
HPI  Pt is a 48yo M for decompensated cirrhosis.   Pt was seen and examined at bedside. Still have belly distension and states belly is warm, no fever noted. BP is normal low side.     Vital Signs Last 24 Hrs  T(C): 36.8 (03 Sep 2022 08:57), Max: 36.8 (03 Sep 2022 08:57)  T(F): 98.2 (03 Sep 2022 08:57), Max: 98.2 (03 Sep 2022 08:57)  HR: 71 (03 Sep 2022 08:57) (71 - 84)  BP: 107/51 (03 Sep 2022 08:57) (106/66 - 109/68)  BP(mean): --  RR: 18 (03 Sep 2022 08:57) (18 - 18)  SpO2: 100% (03 Sep 2022 08:57) (100% - 100%)    Parameters below as of 03 Sep 2022 08:57  Patient On (Oxygen Delivery Method): room air        I&O's Summary    02 Sep 2022 07:01  -  03 Sep 2022 07:00  --------------------------------------------------------  IN: 220 mL / OUT: 0 mL / NET: 220 mL        CAPILLARY BLOOD GLUCOSE          PHYSICAL EXAM:    Constitutional: NAD,  jaundice   HEENT: sclera icterus   Neck: Soft and supple,    Respiratory: Breath sounds are clear bilaterally,   Cardiovascular: S1 and S2, n  Gastrointestinal: Bowel Sounds present, soft, nontender, distended,    Extremities: No peripheral edema  Vascular: 2+ peripheral pulses  Neurological: A/O x 3,   Musculoskeletal: 5/5 strength b/l upper and lower extremities  Skin: No rashes    MEDICATIONS:  MEDICATIONS  (STANDING):  Biotene Dry Mouth Oral Rinse 5 milliLiter(s) Swish and Spit every 4 hours  ciprofloxacin     Tablet 500 milliGRAM(s) Oral daily  folic acid 1 milliGRAM(s) Oral daily  lactulose Syrup 10 Gram(s) Oral four times a day  lidocaine   4% Patch 1 Patch Transdermal every 24 hours  pantoprazole    Tablet 40 milliGRAM(s) Oral before breakfast  rifAXIMin 550 milliGRAM(s) Oral two times a day  simethicone 80 milliGRAM(s) Chew daily  thiamine 100 milliGRAM(s) Oral daily      LABS: All Labs Reviewed:                        9.4    13.62 )-----------( 55       ( 03 Sep 2022 05:05 )             26.8     09-03    129<L>  |  99  |  49.7<H>  ----------------------------<  129<H>  3.6   |  17.0<L>  |  1.08    Ca    7.7<L>      03 Sep 2022 05:05  Mg     2.2     09-02    TPro  5.1<L>  /  Alb  2.2<L>  /  TBili  32.0<H>  /  DBili  x   /  AST  104<H>  /  ALT  94<H>  /  AlkPhos  279<H>  09-03    PT/INR - ( 03 Sep 2022 05:05 )   PT: 26.0 sec;   INR: 2.22 ratio               Blood Culture:     RADIOLOGY/EKG:    DVT PPX:    ADVANCED DIRECTIVE:    DISPOSITION:

## 2022-09-04 NOTE — PROGRESS NOTE ADULT - SUBJECTIVE AND OBJECTIVE BOX
HPI  Pt is a 46yo M for decompensated cirrhosis.   Pt was seen and examined at bedside via [facility]  [facility] ID [id number]. Explained to pt that his bp is still low, will start diuretic once BP is more stable. Denies of any SOB, palpitation     Vital Signs Last 24 Hrs  T(C): 36.5 (04 Sep 2022 13:34), Max: 36.7 (03 Sep 2022 16:49)  T(F): 97.7 (04 Sep 2022 13:34), Max: 98 (03 Sep 2022 16:49)  HR: 68 (04 Sep 2022 13:34) (68 - 74)  BP: 116/69 (04 Sep 2022 13:34) (101/44 - 123/66)  BP(mean): --  RR: 16 (04 Sep 2022 13:34) (16 - 18)  SpO2: 100% (04 Sep 2022 13:34) (100% - 100%)    Parameters below as of 04 Sep 2022 13:34  Patient On (Oxygen Delivery Method): room air        I&O's Summary    04 Sep 2022 07:01  -  04 Sep 2022 14:00  --------------------------------------------------------  IN: 118 mL / OUT: 0 mL / NET: 118 mL        CAPILLARY BLOOD GLUCOSE          PHYSICAL EXAM:  Constitutional: NAD,  jaundice   HEENT: sclera icterus   Neck: Soft and supple,    Respiratory: Breath sounds are clear bilaterally,   Cardiovascular: S1 and S2, n  Gastrointestinal: Bowel Sounds present, soft, nontender, distended,    Extremities: No peripheral edema  Vascular: 2+ peripheral pulses  Neurological: A/O x 3,   Musculoskeletal: 5/5 strength b/l upper and lower extremities  Skin: No rashes      MEDICATIONS:  MEDICATIONS  (STANDING):  Biotene Dry Mouth Oral Rinse 5 milliLiter(s) Swish and Spit every 4 hours  ciprofloxacin     Tablet 500 milliGRAM(s) Oral daily  folic acid 1 milliGRAM(s) Oral daily  lactulose Syrup 10 Gram(s) Oral four times a day  lidocaine   4% Patch 1 Patch Transdermal every 24 hours  midodrine. 5 milliGRAM(s) Oral three times a day  octreotide  Injectable 100 MICROGram(s) SubCutaneous three times a day  pantoprazole    Tablet 40 milliGRAM(s) Oral before breakfast  rifAXIMin 550 milliGRAM(s) Oral two times a day  simethicone 80 milliGRAM(s) Chew daily  thiamine 100 milliGRAM(s) Oral daily      LABS: All Labs Reviewed:                        9.4    13.62 )-----------( 55       ( 03 Sep 2022 05:05 )             26.8     09-03    129<L>  |  99  |  49.7<H>  ----------------------------<  129<H>  3.6   |  17.0<L>  |  1.08    Ca    7.7<L>      03 Sep 2022 05:05    TPro  5.1<L>  /  Alb  2.2<L>  /  TBili  32.0<H>  /  DBili  x   /  AST  104<H>  /  ALT  94<H>  /  AlkPhos  279<H>  09-03    PT/INR - ( 03 Sep 2022 05:05 )   PT: 26.0 sec;   INR: 2.22 ratio               Blood Culture:     RADIOLOGY/EKG:    DVT PPX:    ADVANCED DIRECTIVE:    DISPOSITION:

## 2022-09-04 NOTE — PROGRESS NOTE ADULT - ASSESSMENT
47y/oM PMH EtOH abuse, cirrhosis admitted for decompensated alcoholic cirrhosis, coagulopathy , had episode of epistaxis , now resolved.  started on spironolactone , lasix , cipro for sbp ppx , rifaximin , s/p 2900 cc fluid removal by IR 9/1.   noted to have bp on lower side. lasix and spironolactone held . also low grade temp tmax of 100.4 yesterday. no fever today. monitor for 24-48 hrs , if bp stable and no further episodes of fever can be dcd home pending nephro clearance. needs op hepatology  follow.       *Decompensated alcoholic cirrhosis w/ascites with EtOH abuse   WBC initially  increasing likely 2/2 steroids , dcd 8/29 after 7 days as per gi   bcxs 8/26 neg x 2  peritoneal cxs 8/22 neg x 1   MRCP done showed cirrhosis with portal HTN and large ascites  s/p IR guided repeat  paracenteses 9/1   follow up op with hepatology  after dc   cont lactulose, rifaximin   9/1 hold lasix , spironolactone due to hypotension , can resume at lower dose prior to dc if bp stable   cont cipro 500mg qd indefinitely for ppx for SBP due to low total BF protein   s/p ceftriaxone on admission , now dcd   low grade fever 100.4 x 1 episode 9/1 , no further episodes , wbcs trending down , monitor     *Hypotension   likely secondary to large volume paracentesis.   would cont observe   cont hold lasix and spironolactone again for today   slight improving with Midodrine 5mg TID     *epistaxis / coagulopathy , likely due to liver failure   dcd heparin sq for dvt ppx , scds only   s/p  cryoprecipitate , s/p po /iv  vit k , ffp. not much improved   no further bleeding , monitor new episodes   heme consulted , recommend to Check fibrinogen, if bleeding and  if platelets <50K - can transfuse 1-2 units platelets.  Also can give cryoprecipitate over FFP due to lesser volume.  transfuse platelets prior to planned procedures if plts <50K.    *Macrocytic anemia   likely due to etoh / liver dz   monitor h/h     *Hyponatremia / likely due to liver dz   suspect chronic in setting of EtOH abuse and diuretics use  s/p albumin 100mg once  PO fluid restriction 1L daily  Renal ultrasound  no obstruction    Continue to hold diuretics at this time   renal consult apprecaited   started Octreotide       *dvt ppx: scds   dispo: , sw/ cm for dc planning , monitor for 24-48 hrs , if bp stable and no further episodes of fever can be dcd home pending nephro clearance. needs op hepatology  follow up. patient is undocumented. sw/cm for dc planning.

## 2022-09-05 NOTE — PROGRESS NOTE ADULT - SUBJECTIVE AND OBJECTIVE BOX
Sleeping at time of my assessment. Creatinine is up trending.    Vital Signs Last 24 Hrs  T(C): 36.4 (05 Sep 2022 09:40), Max: 37 (04 Sep 2022 19:46)  T(F): 97.6 (05 Sep 2022 09:40), Max: 98.6 (04 Sep 2022 19:46)  HR: 63 (05 Sep 2022 09:40) (63 - 74)  BP: 126/80 (05 Sep 2022 09:40) (105/57 - 126/80)  BP(mean): --  RR: 18 (05 Sep 2022 09:40) (17 - 18)  SpO2: 100% (05 Sep 2022 09:40) (98% - 100%)    Parameters below as of 05 Sep 2022 09:40  Patient On (Oxygen Delivery Method): room air    I&O's Summary  04 Sep 2022 07:01  -  05 Sep 2022 07:00  --------------------------------------------------------  IN: 118 mL / OUT: 0 mL / NET: 118 mL    Physical Exam  General: Thin male in NAD; lying flat in bed, on room air  Neuro: Sleeping     09-05    124<L>  |  97<L>  |  49.0<H>  ----------------------------<  121<H>  3.9   |  16.0<L>  |  1.34<H>    Ca    7.7<L>      05 Sep 2022 05:15                        10.0   15.32 )-----------( 53       ( 05 Sep 2022 05:15 )             28.5     MEDICATIONS  (STANDING):  Biotene Dry Mouth Oral Rinse 5 milliLiter(s) Swish and Spit every 4 hours  ciprofloxacin     Tablet 500 milliGRAM(s) Oral daily  folic acid 1 milliGRAM(s) Oral daily  lactulose Syrup 10 Gram(s) Oral four times a day  lidocaine   4% Patch 1 Patch Transdermal every 24 hours  midodrine. 7.5 milliGRAM(s) Oral three times a day  octreotide  Injectable 100 MICROGram(s) SubCutaneous three times a day  pantoprazole    Tablet 40 milliGRAM(s) Oral before breakfast  phytonadione   Solution 5 milliGRAM(s) Oral every 24 hours  rifAXIMin 550 milliGRAM(s) Oral two times a day  simethicone 80 milliGRAM(s) Chew daily  thiamine 100 milliGRAM(s) Oral daily    MEDICATIONS  (PRN):  aluminum hydroxide/magnesium hydroxide/simethicone Suspension 30 milliLiter(s) Oral every 4 hours PRN Dyspepsia  melatonin 3 milliGRAM(s) Oral at bedtime PRN Insomnia  ondansetron Injectable 4 milliGRAM(s) IV Push every 8 hours PRN Nausea and/or Vomiting

## 2022-09-05 NOTE — PROGRESS NOTE ADULT - ASSESSMENT
47y/oM PMH EtOH abuse, cirrhosis admitted for decompensated alcoholic cirrhosis, coagulopathy , had episode of epistaxis , now resolved.  started on spironolactone , lasix , cipro for sbp ppx , rifaximin , s/p 2900 cc fluid removal by IR 9/1.   noted to have bp on lower side. lasix and spironolactone held . also low grade temp tmax of 100.4 yesterday. no fever today. monitor for 24-48 hrs , if bp stable and no further episodes of fever can be dcd home pending nephro clearance. needs op hepatology  follow.       *Decompensated alcoholic cirrhosis w/ascites with EtOH abuse now ?? hepatorenal syndrom   WBC initially  increasing likely 2/2 steroids , dcd 8/29 after 7 days as per gi   bcxs 8/26 neg x 2  peritoneal cxs 8/22 neg x 1   MRCP done showed cirrhosis with portal HTN and large ascites  s/p IR guided repeat  paracenteses 9/1   follow up op with hepatology  after dc   cont lactulose, rifaximin   9/1 hold lasix , spironolactone due to hypotension , can resume at lower dose prior to dc if bp stable   cont cipro 500mg qd indefinitely for ppx for SBP due to low total BF protein   s/p ceftriaxone on admission , now dcd   low grade fever 100.4 x 1 episode 9/1 , no further episodes , wbcs trending down , monitor   s/p albumin x 2  INR elevation again, will start Vit K po x3 days today     *Hypotension   intravolum depletion   increased midodrine again   bp is slightly improved today   discussion with nephro, will cont hold diuretic today     *epistaxis / coagulopathy , likely due to liver failure   dcd heparin sq for dvt ppx , scds only   s/p  cryoprecipitate , s/p po /iv  vit k , ffp. not much improved   no further bleeding , monitor new episodes   heme consulted , recommend to Check fibrinogen, if bleeding and  if platelets <50K - can transfuse 1-2 units platelets.  Also can give cryoprecipitate over FFP due to lesser volume.  transfuse platelets prior to planned procedures if plts <50K.    *Macrocytic anemia   likely due to etoh / liver dz   monitor h/h     *Hyponatremia with worsening LASHONDA / likely due to liver dz   suspect chronic in setting of EtOH abuse and diuretics use  s/p albumin x2   PO fluid restriction 1L daily    Continue to hold diuretics at this time   renal consult apprecaited   started Octreotide and increase Midodrine today   Might eventually need HD       *dvt ppx: scds   dispo: , sw/ cm for dc planning   Pt is worsening renal function, would hold diuretic today, start vit K

## 2022-09-05 NOTE — PROGRESS NOTE ADULT - SUBJECTIVE AND OBJECTIVE BOX
HPI  Pt is a 46yo M for decompensated cirrhosis.   Pt was seen and examined at bedside via [place]  Janice [id number]. Pt states he is feeling better. Explain to pt that his kidney function getting worse and INR is more elevated.     Vital Signs Last 24 Hrs  T(C): 36.4 (05 Sep 2022 09:40), Max: 37 (04 Sep 2022 19:46)  T(F): 97.6 (05 Sep 2022 09:40), Max: 98.6 (04 Sep 2022 19:46)  HR: 63 (05 Sep 2022 09:40) (63 - 74)  BP: 126/80 (05 Sep 2022 09:40) (105/57 - 126/80)  BP(mean): --  RR: 18 (05 Sep 2022 09:40) (16 - 18)  SpO2: 100% (05 Sep 2022 09:40) (98% - 100%)    Parameters below as of 05 Sep 2022 09:40  Patient On (Oxygen Delivery Method): room air        I&O's Summary    04 Sep 2022 07:01  -  05 Sep 2022 07:00  --------------------------------------------------------  IN: 118 mL / OUT: 0 mL / NET: 118 mL        CAPILLARY BLOOD GLUCOSE          PHYSICAL EXAM:  Constitutional: NAD,  jaundice   HEENT: sclera icterus   Neck: Soft and supple,    Respiratory: Breath sounds are clear bilaterally,   Cardiovascular: S1 and S2, n  Gastrointestinal: Bowel Sounds present, soft, nontender, distended,    Extremities: No peripheral edema  Vascular: 2+ peripheral pulses  Neurological: A/O x 3,   Musculoskeletal: 5/5 strength b/l upper and lower extremities  Skin: No rashes      MEDICATIONS:  MEDICATIONS  (STANDING):  Biotene Dry Mouth Oral Rinse 5 milliLiter(s) Swish and Spit every 4 hours  ciprofloxacin     Tablet 500 milliGRAM(s) Oral daily  folic acid 1 milliGRAM(s) Oral daily  lactulose Syrup 10 Gram(s) Oral four times a day  lidocaine   4% Patch 1 Patch Transdermal every 24 hours  midodrine. 7.5 milliGRAM(s) Oral three times a day  octreotide  Injectable 100 MICROGram(s) SubCutaneous three times a day  pantoprazole    Tablet 40 milliGRAM(s) Oral before breakfast  phytonadione   Solution 5 milliGRAM(s) Oral every 24 hours  rifAXIMin 550 milliGRAM(s) Oral two times a day  simethicone 80 milliGRAM(s) Chew daily  thiamine 100 milliGRAM(s) Oral daily      LABS: All Labs Reviewed:                        10.0   15.32 )-----------( 53       ( 05 Sep 2022 05:15 )             28.5     09-05    124<L>  |  97<L>  |  49.0<H>  ----------------------------<  121<H>  3.9   |  16.0<L>  |  1.34<H>    Ca    7.7<L>      05 Sep 2022 05:15      PT/INR - ( 05 Sep 2022 05:15 )   PT: 30.0 sec;   INR: 2.56 ratio         PTT - ( 05 Sep 2022 05:15 )  PTT:66.8 sec      Blood Culture:     RADIOLOGY/EKG:    DVT PPX:    ADVANCED DIRECTIVE:    DISPOSITION:

## 2022-09-05 NOTE — PROGRESS NOTE ADULT - ASSESSMENT
The patient is a 47 year old male with PMH of HTN, ? DVT (not on AC as per pt) and significant ETOH abuse resulting in cirrhosis complicated by ascites initially presented to [facility] for worsening abdominal distension; transferred to [facility] for decompensated alcoholic cirrhosis. While here, he underwent large volume paracentesis on 08/22/2022 with 3.8L fluid removal. Fluid studies negative for SBP. Had second paracentesis on 09/01/2022 with 2.9L fluid removal. Also received 7 day course of steroids. Hospital course notable for epistaxis, coagulopathy, hyponatremia and LASHONDA.    Hyponatremia   -Serum osm is WNL x 2  -Lipid panel is okay  -Kappa lambda light chain ratio is acceptable  -SPEP and serum immunofixation are pending     Acute Kidney Injury  -Suspecting decreased effective circulating volume; hepatorenal syndrome (while possible) is a diagnosis of exclusion   -Baseline creatinine is < 0.2mg/dL; latest creatinine is 1.34mg/dL  -UA negative blood, +protein  -UPCR 0.9g/g   -Urine sodium < 30  -Renal ultrasound - R kidney 12.9cm + L kidney 14.8cm - no obstruction   -s/p albumin x 2 days   -Currently on midodrine 5mg TID + octreotide 100mg subQ TID  -Will increase midodrine to 7.5mg TID  -Would continue to hold diuretics at this time     Liver Cirrhosis  Abdominal Ascites  -s/p paracentesis x 2   -In light of LASHONDA would continue to hold spironolactone and lasix at this time      Discussed with primary team.     Dotty Sloan,   Nephrology

## 2022-09-06 DIAGNOSIS — E87.1 HYPO-OSMOLALITY AND HYPONATREMIA: ICD-10-CM

## 2022-09-06 DIAGNOSIS — K70.40 ALCOHOLIC HEPATIC FAILURE WITHOUT COMA: ICD-10-CM

## 2022-09-06 DIAGNOSIS — E87.6 HYPOKALEMIA: ICD-10-CM

## 2022-09-06 DIAGNOSIS — F10.239 ALCOHOL DEPENDENCE WITH WITHDRAWAL, UNSPECIFIED: ICD-10-CM

## 2022-09-06 NOTE — PROGRESS NOTE ADULT - SUBJECTIVE AND OBJECTIVE BOX
[facility] DIVISION OF KIDNEY DISEASES AND HYPERTENSION -- FOLLOW UP NOTE  --------------------------------------------------------------------------------  Chief Complaint:    24 hour events/subjective:        PAST HISTORY  --------------------------------------------------------------------------------  No significant changes to PMH, PSH, FHx, SHx, unless otherwise noted    ALLERGIES & MEDICATIONS  --------------------------------------------------------------------------------  Allergies    No Known Allergies    Intolerances      Standing Inpatient Medications  Biotene Dry Mouth Oral Rinse 5 milliLiter(s) Swish and Spit every 4 hours  ciprofloxacin     Tablet 500 milliGRAM(s) Oral daily  folic acid 1 milliGRAM(s) Oral daily  lactulose Syrup 10 Gram(s) Oral four times a day  lidocaine   4% Patch 1 Patch Transdermal every 24 hours  midodrine. 7.5 milliGRAM(s) Oral three times a day  octreotide  Injectable 100 MICROGram(s) SubCutaneous three times a day  pantoprazole    Tablet 40 milliGRAM(s) Oral before breakfast  phytonadione   Solution 5 milliGRAM(s) Oral every 24 hours  rifAXIMin 550 milliGRAM(s) Oral two times a day  simethicone 80 milliGRAM(s) Chew daily  thiamine 100 milliGRAM(s) Oral daily    PRN Inpatient Medications  aluminum hydroxide/magnesium hydroxide/simethicone Suspension 30 milliLiter(s) Oral every 4 hours PRN  melatonin 3 milliGRAM(s) Oral at bedtime PRN  ondansetron Injectable 4 milliGRAM(s) IV Push every 8 hours PRN      REVIEW OF SYSTEMS  --------------------------------------------------------------------------------  Gen: No weight changes, fatigue, fevers/chills, weakness  Skin: No rashes  Head/Eyes/Ears/Mouth: No headache; Normal hearing; Normal vision w/o blurriness; No sinus pain/discomfort, sore throat  Respiratory: No dyspnea, cough, wheezing, hemoptysis  CV: No chest pain, PND, orthopnea  GI: No abdominal pain, diarrhea, constipation, nausea, vomiting, melena, hematochezia  : No increased frequency, dysuria, hematuria, nocturia  MSK: No joint pain/swelling; no back pain; no edema  Neuro: No dizziness/lightheadedness, weakness, seizures, numbness, tingling  Heme: No easy bruising or bleeding  Endo: No heat/cold intolerance  Psych: No significant nervousness, anxiety, stress, depression    All other systems were reviewed and are negative, except as noted.    VITALS/PHYSICAL EXAM  --------------------------------------------------------------------------------  T(C): 36.5 (09-06-22 @ 10:56), Max: 36.7 (09-05-22 @ 16:09)  HR: 65 (09-06-22 @ 10:56) (63 - 68)  BP: 117/73 (09-06-22 @ 10:56) (98/58 - 120/80)  RR: 18 (09-06-22 @ 10:56) (17 - 18)  SpO2: 99% (09-06-22 @ 10:56) (97% - 100%)  Wt(kg): --        Physical Exam:  	Gen: NAD, well-appearing  	HEENT: PERRL, supple neck, clear oropharynx  	Pulm: CTA B/L  	CV: RRR, S1S2; no rub  	Back: No spinal or CVA tenderness; no sacral edema  	Abd: +BS, soft, nontender/nondistended  	: No suprapubic tenderness  	UE: Warm, FROM, no clubbing, intact strength; no edema; no asterixis  	LE: Warm, FROM, no clubbing, intact strength; no edema  	Neuro: No focal deficits, intact gait  	Psych: Normal affect and mood  	Skin: Warm, without rashes  	Vascular access:    LABS/STUDIES  --------------------------------------------------------------------------------              9.6    16.72 >-----------<  55       [09-06-22 @ 04:14]              28.1     127  |  97  |  58.7  ----------------------------<  141      [09-06-22 @ 04:14]  3.5   |  16.0  |  1.67        Ca     7.4     [09-06-22 @ 04:14]    TPro  4.8  /  Alb  2.1  /  TBili  34.2  /  DBili  x   /  AST  105  /  ALT  86  /  AlkPhos  303  [09-06-22 @ 04:14]    PT/INR: PT 33.2 , INR 2.83       [09-06-22 @ 04:14]  PTT: 73.0       [09-06-22 @ 04:14]      Creatinine Trend:  SCr 1.67 [09-06 @ 04:14]  SCr 1.34 [09-05 @ 05:15]  SCr 1.06 [09-04 @ 17:22]  SCr 1.08 [09-03 @ 05:05]  SCr 0.57 [09-02 @ 05:35]    Urinalysis - [09-01-22 @ 13:30]      Color Yellow / Appearance Clear / SG 1.020 / pH 6.0      Gluc Negative / Ketone Negative  / Bili Negative / Urobili Negative       Blood Negative / Protein 15 / Leuk Est Negative / Nitrite Negative      RBC Negative / WBC Negative / Hyaline  / Gran 0-2 / Sq Epi  / Non Sq Epi Occasional / Bacteria Few    Urine Creatinine 55      [09-01-22 @ 13:30]  Urine Protein 49.0      [09-01-22 @ 13:30]  Urine Sodium <30      [09-01-22 @ 20:20]  Urine Osmolality 342      [09-01-22 @ 20:20]    Iron 59, TIBC 129, %sat 46      [08-22-22 @ 04:11]  Ferritin 4185      [08-22-22 @ 04:11]  TSH 2.93      [09-02-22 @ 05:35]  Lipid: chol 75, , HDL 12, LDL --      [09-02-22 @ 17:44]    HBsAg Nonreact      [08-21-22 @ 04:34]  HCV 0.17, Nonreact      [08-21-22 @ 04:34]    Free Light Chains: kappa 10.51, lambda 10.92, ratio = 0.96      [09-02 @ 17:44]   [facility] DIVISION OF KIDNEY DISEASES AND HYPERTENSION -- FOLLOW UP NOTE  --------------------------------------------------------------------------------  Chief Complaint: agusto    24 hour events/subjective:  Febrile yesterday  no other acute event noted    PAST HISTORY  --------------------------------------------------------------------------------  No significant changes to PMH, PSH, FHx, SHx, unless otherwise noted    ALLERGIES & MEDICATIONS  --------------------------------------------------------------------------------  Allergies    No Known Allergies          Standing Inpatient Medications  Biotene Dry Mouth Oral Rinse 5 milliLiter(s) Swish and Spit every 4 hours  ciprofloxacin     Tablet 500 milliGRAM(s) Oral daily  folic acid 1 milliGRAM(s) Oral daily  lactulose Syrup 10 Gram(s) Oral four times a day  lidocaine   4% Patch 1 Patch Transdermal every 24 hours  midodrine. 7.5 milliGRAM(s) Oral three times a day  octreotide  Injectable 100 MICROGram(s) SubCutaneous three times a day  pantoprazole    Tablet 40 milliGRAM(s) Oral before breakfast  phytonadione   Solution 5 milliGRAM(s) Oral every 24 hours  rifAXIMin 550 milliGRAM(s) Oral two times a day  simethicone 80 milliGRAM(s) Chew daily  thiamine 100 milliGRAM(s) Oral daily    PRN Inpatient Medications  aluminum hydroxide/magnesium hydroxide/simethicone Suspension 30 milliLiter(s) Oral every 4 hours PRN  melatonin 3 milliGRAM(s) Oral at bedtime PRN  ondansetron Injectable 4 milliGRAM(s) IV Push every 8 hours PRN      REVIEW OF SYSTEMS  --------------------------------------------------------------------------------  Gen: No weight changes, fatigue+  kin: No rashes  Head/Eyes/Ears/Mouth: No headache; Normal hearing; Normal vision w/o blurriness; No sinus pain/discomfort, sore throat  Respiratory: No dyspnea, cough, wheezing, hemoptysis  CV: No chest pain, PND, orthopnea  GI: abdominal discomfort  ; no frequency, dysuria, hematuria, nocturia  MSK: No joint pain/swelling; no back pain; no edema  Neuro: No dizziness/lightheadedness, weakness, seizures, numbness, tingling  Heme: No easy bruising or bleeding  Endo: No heat/cold intolerance  Psych: No significant nervousness, anxiety, stress, depression    All other systems were reviewed and are negative, except as noted.    VITALS/PHYSICAL EXAM  --------------------------------------------------------------------------------  T(C): 36.5 (09-06-22 @ 10:56), Max: 36.7 (09-05-22 @ 16:09)  HR: 65 (09-06-22 @ 10:56) (63 - 68)  BP: 117/73 (09-06-22 @ 10:56) (98/58 - 120/80)  RR: 18 (09-06-22 @ 10:56) (17 - 18)  SpO2: 99% (09-06-22 @ 10:56) (97% - 100%)  Wt(kg): --        Physical Exam:  Constitutional: NAD, resting  HEENT: sclera icterus   Neck: Soft and supple,    Respiratory: Breath sounds are clear bilaterally,   Cardiovascular: S1 and S2,   Gastrointestinal: Bowel Sounds present,  Extremities: No peripheral edema  Skin: jaundiced    LABS/STUDIES  --------------------------------------------------------------------------------              9.6    16.72 >-----------<  55       [09-06-22 @ 04:14]              28.1     127  |  97  |  58.7  ----------------------------<  141      [09-06-22 @ 04:14]  3.5   |  16.0  |  1.67        Ca     7.4     [09-06-22 @ 04:14]    TPro  4.8  /  Alb  2.1  /  TBili  34.2  /  DBili  x   /  AST  105  /  ALT  86  /  AlkPhos  303  [09-06-22 @ 04:14]    PT/INR: PT 33.2 , INR 2.83       [09-06-22 @ 04:14]  PTT: 73.0       [09-06-22 @ 04:14]      Creatinine Trend:  SCr 1.67 [09-06 @ 04:14]  SCr 1.34 [09-05 @ 05:15]  SCr 1.06 [09-04 @ 17:22]  SCr 1.08 [09-03 @ 05:05]  SCr 0.57 [09-02 @ 05:35]    Urinalysis - [09-01-22 @ 13:30]      Color Yellow / Appearance Clear / SG 1.020 / pH 6.0      Gluc Negative / Ketone Negative  / Bili Negative / Urobili Negative       Blood Negative / Protein 15 / Leuk Est Negative / Nitrite Negative      RBC Negative / WBC Negative / Hyaline  / Gran 0-2 / Sq Epi  / Non Sq Epi Occasional / Bacteria Few    Urine Creatinine 55      [09-01-22 @ 13:30]  Urine Protein 49.0      [09-01-22 @ 13:30]  Urine Sodium <30      [09-01-22 @ 20:20]  Urine Osmolality 342      [09-01-22 @ 20:20]    Iron 59, TIBC 129, %sat 46      [08-22-22 @ 04:11]  Ferritin 4185      [08-22-22 @ 04:11]  TSH 2.93      [09-02-22 @ 05:35]  Lipid: chol 75, , HDL 12, LDL --      [09-02-22 @ 17:44]    HBsAg Nonreact      [08-21-22 @ 04:34]  HCV 0.17, Nonreact      [08-21-22 @ 04:34]    Free Light Chains: kappa 10.51, lambda 10.92, ratio = 0.96      [09-02 @ 17:44]

## 2022-09-06 NOTE — PROGRESS NOTE ADULT - ASSESSMENT
The patient is a 47 year old male with PMH of HTN, ? DVT (not on AC as per pt) and significant ETOH abuse resulting in cirrhosis complicated by ascites initially presented to [facility] for worsening abdominal distension; transferred to [facility] for decompensated alcoholic cirrhosis. While here, he underwent large volume paracentesis on 08/22/2022 with 3.8L fluid removal. Fluid studies negative for SBP. Had second paracentesis on 09/01/2022 with 2.9L fluid removal. Also received 7 day course of steroids. Hospital course notable for epistaxis, coagulopathy, hyponatremia and LASHONDA.    Hyponatremia in setting of liver cirrhosis and diuretics use  Acute Kidney Injury  -Suspecting decreased effective circulating volume; hepatorenal syndrome (while possible) is a diagnosis of exclusion   -Baseline creatinine is < 0.2mg/dL; latest creatinine is 1.34mg/dL  -UA negative blood, +protein  -UPCR 0.9g/g   -Urine sodium < 30  -Renal ultrasound - R kidney 12.9cm + L kidney 14.8cm - no obstruction   -s/p albumin x 2 days   -Currently on midodrine 5mg TID + octreotide 100mg subQ TID  -Will increase midodrine to 7.5mg TID  -Would continue to hold diuretics at this time     Liver Cirrhosis  Abdominal Ascites  -s/p paracentesis x 2   -In light of LASHONDA would continue to hold spironolactone and lasix at this time        Nephrology        The patient is a 47 year old male with PMH of HTN, ? DVT (not on AC as per pt) and significant ETOH abuse resulting in cirrhosis complicated by ascites initially presented to [facility] for worsening abdominal distension; transferred to [facility] for decompensated alcoholic cirrhosis. While here, he underwent large volume paracentesis on 08/22/2022 with 3.8L fluid removal. Fluid studies negative for SBP. Had second paracentesis on 09/01/2022 with 2.9L fluid removal. Also received 7 day course of steroids. Hospital course notable for epistaxis, coagulopathy, hyponatremia and LASHONDA.    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

## 2022-09-06 NOTE — PROGRESS NOTE ADULT - ASSESSMENT
47y/oM PMH EtOH abuse, cirrhosis admitted for decompensated alcoholic cirrhosis, coagulopathy , had episode of epistaxis , now resolved.  started on spironolactone , lasix , cipro for sbp ppx , rifaximin , s/p 2900 cc fluid removal by IR 9/1.   noted to have bp on lower side. lasix and spironolactone held . also low grade temp tmax of 100.4 yesterday. no fever today. monitor for 24-48 hrs , if bp stable and no further episodes of fever can be dcd home pending nephro clearance. needs op hepatology  follow.       *Decompensated alcoholic cirrhosis w/ascites with EtOH abuse now ?? hepatorenal syndrome  WBC initially  increasing likely 2/2 steroids , dcd 8/29 after 7 days as per gi   bcxs 8/26 neg x 2  peritoneal cxs 8/22 neg x 1   MRCP done showed cirrhosis with portal HTN and large ascites  s/p IR guided repeat  paracenteses 9/1   follow up op with hepatology  after dc   cont lactulose, rifaximin   9/1 hold lasix , spironolactone due to hypotension , can resume at lower dose prior to dc if bp stable   cont cipro 500mg qd indefinitely for ppx for SBP due to low total BF protein   s/p ceftriaxone on admission , now dcd   INR elevation again, c/w Vit K po x3 days (2/3)     *Hypotension   2/2 intravolume depletion  stable today  c/w Midodrine Q8  bp is slightly improved today   discussion with nephro, will cont hold diuretic today     *epistaxis / coagulopathy , likely due to liver failure   dcd heparin sq for dvt ppx , scds only   s/p  cryoprecipitate , s/p po /iv  vit k , ffp. not much improved   no further bleeding , monitor new episodes   heme consulted , recommend to Check fibrinogen, if bleeding and  if platelets <50K - can transfuse 1-2 units platelets.  Also can give cryoprecipitate over FFP due to lesser volume.  transfuse platelets prior to planned procedures if plts <50K.    *Macrocytic anemia   likely due to etoh / liver dz   monitor h/h     *Hyponatremia with worsening LASHONDA / likely due to liver dz   suspect chronic in setting of EtOH abuse and diuretics use  s/p albumin x2   PO fluid restriction 1L daily    Continue to hold diuretics at this time   renal consult apprecaited   c/w Octreotide and increase Midodrine  Might eventually need HD       *dvt ppx: scds   dispo: , sw/ yun for dc planning   Pt is worsening renal function

## 2022-09-06 NOTE — PROGRESS NOTE ADULT - SUBJECTIVE AND OBJECTIVE BOX
[facility] Division of Hospital Medicine    Chief Complaint:  Decompensated Cirrhosis    SUBJECTIVE / OVERNIGHT EVENTS:  No events overnight. Pt endorsing return of bloating pain s/p ascites. Patient denies chest pain, SOB, N/V, fever, chills, dysuria or any other complaints. All remainder ROS negative.     MEDICATIONS  (STANDING):  Biotene Dry Mouth Oral Rinse 5 milliLiter(s) Swish and Spit every 4 hours  ciprofloxacin     Tablet 500 milliGRAM(s) Oral daily  folic acid 1 milliGRAM(s) Oral daily  lactulose Syrup 10 Gram(s) Oral four times a day  lidocaine   4% Patch 1 Patch Transdermal every 24 hours  midodrine. 7.5 milliGRAM(s) Oral three times a day  octreotide  Injectable 100 MICROGram(s) SubCutaneous three times a day  pantoprazole    Tablet 40 milliGRAM(s) Oral before breakfast  phytonadione   Solution 5 milliGRAM(s) Oral every 24 hours  rifAXIMin 550 milliGRAM(s) Oral two times a day  simethicone 80 milliGRAM(s) Chew daily  thiamine 100 milliGRAM(s) Oral daily    MEDICATIONS  (PRN):  aluminum hydroxide/magnesium hydroxide/simethicone Suspension 30 milliLiter(s) Oral every 4 hours PRN Dyspepsia  melatonin 3 milliGRAM(s) Oral at bedtime PRN Insomnia  ondansetron Injectable 4 milliGRAM(s) IV Push every 8 hours PRN Nausea and/or Vomiting        I&O's Summary      PHYSICAL EXAM:  Vital Signs Last 24 Hrs  T(C): 36.5 (06 Sep 2022 10:56), Max: 36.7 (05 Sep 2022 16:09)  T(F): 97.7 (06 Sep 2022 10:56), Max: 98 (05 Sep 2022 16:09)  HR: 65 (06 Sep 2022 10:56) (63 - 68)  BP: 117/73 (06 Sep 2022 10:56) (98/58 - 120/80)  BP(mean): --  RR: 18 (06 Sep 2022 10:56) (17 - 18)  SpO2: 99% (06 Sep 2022 10:56) (97% - 100%)    Parameters below as of 06 Sep 2022 10:56  Patient On (Oxygen Delivery Method): room air            PHYSICAL EXAM:  Constitutional: NAD,  jaundice   HEENT: sclera icterus, EOMI, PERRLA   Neck: Soft and supple,    Respiratory: Breath sounds are clear bilaterally,   Cardiovascular: S1 and S2, n  Gastrointestinal: Bowel Sounds present, soft, nontender, distended,    Extremities: No peripheral edema  Vascular: 2+ peripheral pulses  Neurological: A/O x 3,   Musculoskeletal: 5/5 strength b/l upper and lower extremities  Skin: No rashes, mild bruising from old paracentesis site on abdpmen    LABS:                        9.6    16.72 )-----------( 55       ( 06 Sep 2022 04:14 )             28.1     09-06    127<L>  |  97<L>  |  58.7<H>  ----------------------------<  141<H>  3.5   |  16.0<L>  |  1.67<H>    Ca    7.4<L>      06 Sep 2022 04:14    TPro  4.8<L>  /  Alb  2.1<L>  /  TBili  34.2<H>  /  DBili  x   /  AST  105<H>  /  ALT  86<H>  /  AlkPhos  303<H>  09-06    PT/INR - ( 06 Sep 2022 04:14 )   PT: 33.2 sec;   INR: 2.83 ratio         PTT - ( 06 Sep 2022 04:14 )  PTT:73.0 sec          CAPILLARY BLOOD GLUCOSE            RADIOLOGY & ADDITIONAL TESTS:  Results Reviewed: y  Imaging Personally Reviewed: n  Electrocardiogram Personally Reviewed: n

## 2022-09-07 NOTE — CONSULT NOTE ADULT - ASSESSMENT
47y/oM PMHx EtOH abuse, HTN and cirrhosis transferred from [facility] for decompensated liver cirrhosis on 8/20. Patient now with declining renal function and need for HD. While here, he underwent large volume paracentesis on 08/22/2022 with 3.8L fluid removal. Fluid studies negative for SBP. Had second paracentesis on 09/01/2022 with 2.9L fluid removal. Also received 7 day course of steroids. Hospital course notable for epistaxis, coagulopathy, hyponatremia and LASHONDA. Consulted for dialysis access. Pt seen & examined at bedside. Only complaint at this time is abdominal pain. Patient is jaundice with ascites noted. No F/C, N/V, CP/SOB.    Plan:   Acute Kidney Injury  -No indication for emergent dialysis  -Will place Shiley tomorrow (9/8) likely to femoral area due to patients coagulopathic state.   -Recommend consult to IR for PermaCath placement when appropriate   -Will speak with nephrology about need for long term dialysis and fistula placement.

## 2022-09-07 NOTE — PROGRESS NOTE ADULT - SUBJECTIVE AND OBJECTIVE BOX
Seen sleeping in bed; wakes up to voice. Renal function is worsening. Patient was confused during my attempts of communicating with him through [employer]  # [id number].    Vital Signs Last 24 Hrs  T(C): 36.4 (07 Sep 2022 15:45), Max: 36.4 (06 Sep 2022 21:11)  T(F): 97.5 (07 Sep 2022 15:45), Max: 97.6 (06 Sep 2022 21:11)  HR: 62 (07 Sep 2022 15:45) (61 - 67)  BP: 111/71 (07 Sep 2022 15:45) (105/52 - 115/55)  BP(mean): --  RR: 18 (07 Sep 2022 15:45) (18 - 18)  SpO2: 100% (07 Sep 2022 15:45) (99% - 100%)    Parameters below as of 07 Sep 2022 15:45  Patient On (Oxygen Delivery Method): room air    I&O's Summary  06 Sep 2022 07:01  -  07 Sep 2022 07:00  --------------------------------------------------------  IN: 0 mL / OUT: 800 mL / NET: -800 mL  07 Sep 2022 07:01  -  07 Sep 2022 16:35  --------------------------------------------------------  IN: 360 mL / OUT: 1100 mL / NET: -740 mL    Physical Exam  General: WDWN male in NAD  Cardiac: S1S2 RRR  Respiratory: CTAB  Abdomen: Distended, non tender  Extremities: No edema  Neuro: Awake, confused     09-07    123<L>  |  94<L>  |  67.9<H>  ----------------------------<  138<H>  3.5   |  14.0<L>  |  1.98<H>    Ca    7.4<L>      07 Sep 2022 06:34    TPro  5.0<L>  /  Alb  2.1<L>  /  TBili  36.7<H>  /  DBili  x   /  AST  100<H>  /  ALT  85<H>  /  AlkPhos  329<H>  09-07                          10.5   18.55 )-----------( 54       ( 07 Sep 2022 06:34 )             28.9     MEDICATIONS  (STANDING):  Biotene Dry Mouth Oral Rinse 5 milliLiter(s) Swish and Spit every 4 hours  ciprofloxacin     Tablet 500 milliGRAM(s) Oral daily  folic acid 1 milliGRAM(s) Oral daily  lactulose Syrup 10 Gram(s) Oral four times a day  lidocaine   4% Patch 1 Patch Transdermal every 24 hours  midodrine. 7.5 milliGRAM(s) Oral three times a day  octreotide  Injectable 100 MICROGram(s) SubCutaneous three times a day  pantoprazole    Tablet 40 milliGRAM(s) Oral before breakfast  rifAXIMin 550 milliGRAM(s) Oral two times a day  simethicone 80 milliGRAM(s) Chew daily  thiamine 100 milliGRAM(s) Oral daily    MEDICATIONS  (PRN):  aluminum hydroxide/magnesium hydroxide/simethicone Suspension 30 milliLiter(s) Oral every 4 hours PRN Dyspepsia  melatonin 3 milliGRAM(s) Oral at bedtime PRN Insomnia  ondansetron Injectable 4 milliGRAM(s) IV Push every 8 hours PRN Nausea and/or Vomiting

## 2022-09-07 NOTE — PROGRESS NOTE ADULT - ASSESSMENT
The patient is a 47 year old male with PMH of HTN, ? DVT (not on AC as per pt) and significant ETOH abuse resulting in cirrhosis complicated by ascites initially presented to [facility] for worsening abdominal distension; transferred to [facility] for decompensated alcoholic cirrhosis. While here, he underwent large volume paracentesis on 08/22/2022 with 3.8L fluid removal. Fluid studies negative for SBP. Had second paracentesis on 09/01/2022 with 2.9L fluid removal. Also received 7 day course of steroids. Hospital course notable for epistaxis, coagulopathy, hyponatremia and LASHONDA.    Acute Kidney Injury, worsening   -Suspecting hepatorenal syndrome   -Baseline creatinine is < 0.2mg/dL; latest creatinine is 1.9mg/dL  -UA negative blood, +protein (prior UA consistent with UTI; no other UA is available for comparison)  -UPCR 0.9g/g   -Urine sodium < 30  -Renal ultrasound - R kidney 12.9cm + L kidney 14.8cm - no obstruction   -Diuretics held  -s/p IV albumin x 2 days  -On midodrine 7.5mg TID + octreotide 100mg subQ TID  -Renal function continues to decline despite above interventions   -Now notable for new onset confusion - I communicated with the patient through [employer] interpretor # [id number] and explained to him re: his worsening renal function and high likelihood of needing renal replacement therapy; however the patient responded with questions about his "infection" instead. He was unable to return verbalized his understanding of his impaired renal function and was mute when  asked him to return verbalize our conversation on several occasions. This behavior is an acute change from his baseline where he is often engaging and asking appropriate questions.  -There is concern for uremic encephalopathy in light of his worsening renal function   -Of note, there is no emergency contact nor family members listed  -Per palliative documentation dated on 08/31/2022, the patient had declined hospice services and wished to remain full code   -In light of patient having expressed his wish to maintain aggressive measures, will proceed with hemodialysis through 2 physician consent (myself and primary physician)  -Placement of non tunneled dialysis catheter will be deferred to Vascular Surgery     Hyponatremia   -Serum osm is WNL x 2  -Lipid panel is okay  -Kappa lambda light chain ratio is acceptable    Discussed with primary team.     Dotty Sloan DO  Nephrology        The patient is a 47 year old male with PMH of HTN, ? DVT (not on AC as per pt) and significant ETOH abuse resulting in cirrhosis complicated by ascites initially presented to [facility] for worsening abdominal distension; transferred to [facility] for decompensated alcoholic cirrhosis. While here, he underwent large volume paracentesis on 08/22/2022 with 3.8L fluid removal. Fluid studies negative for SBP. Had second paracentesis on 09/01/2022 with 2.9L fluid removal. Also received 7 day course of steroids. Hospital course notable for epistaxis, coagulopathy, hyponatremia and LASHONDA.    Acute Kidney Injury, worsening   -Suspecting hepatorenal syndrome   -Baseline creatinine is < 0.2mg/dL; latest creatinine is 1.9mg/dL  -UA negative blood, +protein (prior UA consistent with UTI; no other UA is available for comparison)  -UPCR 0.9g/g   -Urine sodium < 30  -Renal ultrasound - R kidney 12.9cm + L kidney 14.8cm - no obstruction   -Diuretics held  -s/p IV albumin x 2 days  -On midodrine 7.5mg TID + octreotide 100mg subQ TID  -Renal function continues to decline despite above interventions   -Now notable for new onset confusion - I communicated with the patient through [employer] interpretor # [id number] and explained to him re: his worsening renal function and high likelihood of needing renal replacement therapy; however the patient responded with questions about his "infection" instead. He was unable to return verbalized his understanding of his impaired renal function and was mute when  asked him to return verbalize our conversation on several occasions. This behavior is an acute change from his baseline where he is often engaging and asking appropriate questions.  -There is concern for ? uremic encephalopathy in light of his worsening renal function   -Of note, there is no emergency contact nor family members listed  -Per palliative documentation dated on 08/31/2022, the patient had declined hospice services and wished to remain full code   -In light of patient having expressed his wish to maintain aggressive measures, will proceed with hemodialysis through 2 physician consent (myself and primary physician)  -Placement of non tunneled dialysis catheter will be deferred to Vascular Surgery     Hyponatremia   -Serum osm is WNL x 2  -Lipid panel is okay  -Kappa lambda light chain ratio is acceptable    ADDENDUM (09/07/2022 at 5:59pm): Had an extensive conversation with primary team and Nephrology Division, given that the patient is not a liver transplant candidate; he is consequently not a candidate for renal replacement therapy. He remains non oliguric at this time. Will continue medical management - will start isotonic bicarb gtt at 50cc/hr. While uremic encephalopathy is possible, recommend exploring other etiologies as well. Spoke with Vascular re: cancellation of dialysis catheter placement.      Discussed with primary team.     Dotty Sloan DO  Nephrology

## 2022-09-07 NOTE — PROGRESS NOTE ADULT - ASSESSMENT
47y/oM PMH EtOH abuse, cirrhosis admitted for decompensated alcoholic cirrhosis, coagulopathy , had episode of epistaxis , now resolved.  started on spironolactone , lasix , cipro for sbp ppx , rifaximin , s/p 2900 cc fluid removal by IR 9/1.   noted to have bp on lower side. lasix and spironolactone held . also low grade temp tmax of 100.4 yesterday. no fever today. monitor for 24-48 hrs , if bp stable and no further episodes of fever can be dcd home pending nephro clearance. needs op hepatology  follow.       *Decompensated alcoholic cirrhosis w/ascites with EtOH abuse now ?? hepatorenal syndrome  WBC initially  increasing likely 2/2 steroids , dcd 8/29 after 7 days as per gi   bcxs 8/26 neg x 2  peritoneal cxs 8/22 neg x 1   MRCP done showed cirrhosis with portal HTN and large ascites  s/p IR guided repeat  paracenteses 9/1   follow up op with hepatology  after dc   cont lactulose, rifaximin   9/1 hold lasix , spironolactone due to hypotension , can resume at lower dose prior to dc if bp stable   cont cipro 500mg qd indefinitely for ppx for SBP due to low total BF protein   s/p ceftriaxone on admission , now dcd   INR elevation again, c/w Vit K po x3 days (3/3)   Worsening LASHONDA likely needs HD    *Hypotension   2/2 intravolume depletion  stable today  c/w Midodrine Q8  bp is slightly improved today   discussion with nephro, will cont hold diuretic    *epistaxis / coagulopathy , likely due to liver failure   dcd heparin sq for dvt ppx , scds only   s/p  cryoprecipitate , s/p po /iv  vit k , ffp. not much improved   no further bleeding , monitor new episodes   heme consulted , recommend to Check fibrinogen, if bleeding and  if platelets <50K - can transfuse 1-2 units platelets.  Also can give cryoprecipitate over FFP due to lesser volume.  transfuse platelets prior to planned procedures if plts <50K.    *Macrocytic anemia   likely due to etoh / liver dz   monitor h/h     *Hyponatremia with worsening LASHONDA / likely due to liver dz   suspect chronic in setting of EtOH abuse and diuretics use  s/p albumin x2   PO fluid restriction 1L daily    Continue to hold diuretics at this time   renal consult apprecaited   c/w Octreotide and increase Midodrine  Might eventually need HD       *dvt ppx: scds   dispo: , sw/ cm for dc planning   Pt is worsening renal function, likely needs HD

## 2022-09-07 NOTE — CONSULT NOTE ADULT - SUBJECTIVE AND OBJECTIVE BOX
Vascular Surgery Consult Progress Note    Subjective: Patient states "the doctor explained to me that I they need to clean my blood."  Objective:   47y/oM PMHx EtOH abuse, HTN and cirrhosis transferred from [facility] for decompensated liver cirrhosis on 8/20. Patient now with declining renal function and need for HD. While here, he underwent large volume paracentesis on 08/22/2022 with 3.8L fluid removal. Fluid studies negative for SBP. Had second paracentesis on 09/01/2022 with 2.9L fluid removal. Also received 7 day course of steroids. Hospital course notable for epistaxis, coagulopathy, hyponatremia and LASHONDA. Consulted for dialysis access. Pt seen & examined at bedside. Only complaint at this time is abdominal pain. Patient is jaundice with ascites noted. No F/C, N/V, CP/SOB.    Medications:  ciprofloxacin     Tablet 500  rifAXIMin 550  ciprofloxacin     Tablet 500  midodrine. 7.5  rifAXIMin 550      Vital Signs Last 24 Hrs  T(C): 36.4 (07 Sep 2022 15:45), Max: 36.4 (06 Sep 2022 21:11)  T(F): 97.5 (07 Sep 2022 15:45), Max: 97.6 (06 Sep 2022 21:11)  HR: 62 (07 Sep 2022 15:45) (61 - 67)  BP: 111/71 (07 Sep 2022 15:45) (105/52 - 115/55)  BP(mean): --  RR: 18 (07 Sep 2022 15:45) (18 - 18)  SpO2: 100% (07 Sep 2022 15:45) (99% - 100%)    Parameters below as of 07 Sep 2022 15:45  Patient On (Oxygen Delivery Method): room air        I&O's Summary    06 Sep 2022 07:01  -  07 Sep 2022 07:00  --------------------------------------------------------  IN: 0 mL / OUT: 800 mL / NET: -800 mL    07 Sep 2022 07:01  -  07 Sep 2022 17:12  --------------------------------------------------------  IN: 360 mL / OUT: 1100 mL / NET: -740 mL    Physical Exam:  Patient appears comfortable. No acute distress  HEENT:  Atraumatic. Normocephalic. +scleral icterus   Neurologic: A & O x 2, no focal deficits. EOMI.  Cardiovascular: normal rate and rhythm  Respiratory: respirations are even and non labored   Gastrointestinal:  +Ascities  Extremities: +jaundice, HAMMOND, warm and well perfused  Pulses: palpable radial pulses, palpable femoral pulses, palpable pedal pulses       LABS:                        10.5   18.55 )-----------( 54       ( 07 Sep 2022 06:34 )             28.9     09-07    123<L>  |  94<L>  |  67.9<H>  ----------------------------<  138<H>  3.5   |  14.0<L>  |  1.98<H>    Ca    7.4<L>      07 Sep 2022 06:34    TPro  5.0<L>  /  Alb  2.1<L>  /  TBili  36.7<H>  /  DBili  x   /  AST  100<H>  /  ALT  85<H>  /  AlkPhos  329<H>  09-07    PT/INR - ( 07 Sep 2022 06:34 )   PT: 31.3 sec;   INR: 2.67 ratio         PTT - ( 06 Sep 2022 04:14 )  PTT:73.0 sec

## 2022-09-07 NOTE — PROGRESS NOTE ADULT - SUBJECTIVE AND OBJECTIVE BOX
[facility] Division of Hospital Medicine    Chief Complaint:  Decompensated Cirrhosis    SUBJECTIVE / OVERNIGHT EVENTS:  No events overnight. Pt endorsing return of bloating pain s/p ascites. Patient denies chest pain, SOB, N/V, fever, chills, dysuria or any other complaints. All remainder ROS negative.     MEDICATIONS  (STANDING):  Biotene Dry Mouth Oral Rinse 5 milliLiter(s) Swish and Spit every 4 hours  ciprofloxacin     Tablet 500 milliGRAM(s) Oral daily  folic acid 1 milliGRAM(s) Oral daily  lactulose Syrup 10 Gram(s) Oral four times a day  lidocaine   4% Patch 1 Patch Transdermal every 24 hours  midodrine. 7.5 milliGRAM(s) Oral three times a day  octreotide  Injectable 100 MICROGram(s) SubCutaneous three times a day  pantoprazole    Tablet 40 milliGRAM(s) Oral before breakfast  phytonadione   Solution 5 milliGRAM(s) Oral every 24 hours  rifAXIMin 550 milliGRAM(s) Oral two times a day  simethicone 80 milliGRAM(s) Chew daily  thiamine 100 milliGRAM(s) Oral daily    MEDICATIONS  (PRN):  aluminum hydroxide/magnesium hydroxide/simethicone Suspension 30 milliLiter(s) Oral every 4 hours PRN Dyspepsia  melatonin 3 milliGRAM(s) Oral at bedtime PRN Insomnia  ondansetron Injectable 4 milliGRAM(s) IV Push every 8 hours PRN Nausea and/or Vomiting        I&O's Summary    06 Sep 2022 07:01  -  07 Sep 2022 07:00  --------------------------------------------------------  IN: 0 mL / OUT: 800 mL / NET: -800 mL    07 Sep 2022 07:01  -  07 Sep 2022 11:36  --------------------------------------------------------  IN: 0 mL / OUT: 300 mL / NET: -300 mL        PHYSICAL EXAM:  Vital Signs Last 24 Hrs  T(C): 36.4 (07 Sep 2022 10:33), Max: 36.4 (06 Sep 2022 16:15)  T(F): 97.5 (07 Sep 2022 10:33), Max: 97.6 (06 Sep 2022 21:11)  HR: 61 (07 Sep 2022 10:33) (61 - 67)  BP: 111/71 (07 Sep 2022 10:33) (105/52 - 115/55)  BP(mean): --  RR: 18 (07 Sep 2022 10:33) (18 - 18)  SpO2: 99% (07 Sep 2022 10:33) (99% - 100%)    Parameters below as of 07 Sep 2022 10:33  Patient On (Oxygen Delivery Method): room air      PHYSICAL EXAM:  Constitutional: NAD,  jaundice   HEENT: sclera icterus, EOMI, PERRLA   Neck: Soft and supple,    Respiratory: Breath sounds are clear bilaterally,   Cardiovascular: S1 and S2, n  Gastrointestinal: Bowel Sounds present, soft, nontender, distended,    Extremities: No peripheral edema  Vascular: 2+ peripheral pulses  Neurological: A/O x 3,   Musculoskeletal: 5/5 strength b/l upper and lower extremities  Skin: No rashes, mild bruising from old paracentesis site on abdomen    LABS:                        10.5   18.55 )-----------( 54       ( 07 Sep 2022 06:34 )             28.9     09-07    123<L>  |  94<L>  |  67.9<H>  ----------------------------<  138<H>  3.5   |  14.0<L>  |  1.98<H>    Ca    7.4<L>      07 Sep 2022 06:34    TPro  5.0<L>  /  Alb  2.1<L>  /  TBili  36.7<H>  /  DBili  x   /  AST  100<H>  /  ALT  85<H>  /  AlkPhos  329<H>  09-07    PT/INR - ( 07 Sep 2022 06:34 )   PT: 31.3 sec;   INR: 2.67 ratio         PTT - ( 06 Sep 2022 04:14 )  PTT:73.0 sec          CAPILLARY BLOOD GLUCOSE            RADIOLOGY & ADDITIONAL TESTS:  Results Reviewed: y  Imaging Personally Reviewed: n  Electrocardiogram Personally Reviewed:n

## 2022-09-08 DIAGNOSIS — E87.1 HYPO-OSMOLALITY AND HYPONATREMIA: ICD-10-CM

## 2022-09-08 DIAGNOSIS — F10.239 ALCOHOL DEPENDENCE WITH WITHDRAWAL, UNSPECIFIED: ICD-10-CM

## 2022-09-08 DIAGNOSIS — K70.40 ALCOHOLIC HEPATIC FAILURE WITHOUT COMA: ICD-10-CM

## 2022-09-08 DIAGNOSIS — E87.6 HYPOKALEMIA: ICD-10-CM

## 2022-09-08 NOTE — PROGRESS NOTE ADULT - ASSESSMENT
The patient is a 47 year old male with PMH of HTN, ? DVT (not on AC as per pt) and significant ETOH abuse resulting in cirrhosis complicated by ascites initially presented to [facility] for worsening abdominal distension; transferred to [facility] for decompensated alcoholic cirrhosis. While here, he underwent large volume paracentesis on 08/22/2022 with 3.8L fluid removal. Fluid studies negative for SBP. Had second paracentesis on 09/01/2022 with 2.9L fluid removal. Also received 7 day course of steroids. Hospital course notable for epistaxis, coagulopathy, hyponatremia and LASHONDA.    Acute Kidney Injury, worsening   -Suspecting hepatorenal syndrome   -Baseline creatinine is < 0.2mg/dL; latest creatinine is 1.9mg/dL  -UA negative blood, +protein (prior UA consistent with UTI; no other UA is available for comparison)  -UPCR 0.9g/g   -Urine sodium < 30  -Renal ultrasound - R kidney 12.9cm + L kidney 14.8cm - no obstruction   -Diuretics held  -s/p IV albumin x 2 days  -On midodrine 7.5mg TID + octreotide 100mg subQ TID  -Renal function continues to decline despite above interventions   -Now notable for new onset confusion - I communicated with the patient through [employer] interpretor # [id number] and explained to him re: his worsening renal function and high likelihood of needing renal replacement therapy; however the patient responded with questions about his "infection" instead. He was unable to return verbalized his understanding of his impaired renal function and was mute when  asked him to return verbalize our conversation on several occasions. This behavior is an acute change from his baseline where he is often engaging and asking appropriate questions.  -There is concern for ? uremic encephalopathy in light of his worsening renal function   -Of note, there is no emergency contact nor family members listed  -Per palliative documentation dated on 08/31/2022, the patient had declined hospice services and wished to remain full code   -In light of patient having expressed his wish to maintain aggressive measures, will proceed with hemodialysis through 2 physician consent (myself and primary physician)  -Placement of non tunneled dialysis catheter will be deferred to Vascular Surgery     Hyponatremia   -Serum osm is WNL x 2  -Lipid panel Reviewed,   -Kappa lambda light chain ratio is acceptable    ADDENDUM (09/07/2022 at 5:59pm): Had an extensive conversation with primary team and Nephrology Division, given that the patient is not a liver transplant candidate; he is consequently not a candidate for renal replacement therapy. He remains non oliguric at this time. Will continue medical management - will start isotonic bicarb gtt at 50cc/hr. While uremic encephalopathy is possible, recommend exploring other etiologies as well.     Spoke with Vascular re: cancellation of dialysis catheter placement.     C/W Current management,

## 2022-09-08 NOTE — CHART NOTE - NSCHARTNOTEFT_GEN_A_CORE
Source: Patient [ ]  Family [ ]   other [x] EMR, staff and ID rounds    Current Diet:   Diet, Regular:   60 gm Protein (PRO60G)  1000mL Fluid Restriction ([id number]) (08-25-22 @ 14:14)    Patient reports [ ] nausea  [ ] vomiting [ ] diarrhea [ ] constipation  [ ]chewing problems [ ] swallowing issues  [ ] other:     PO intake:  < 50% [ ]   50-75%  [x ]   %  [ ]  other :    Source for PO intake [ ] Patient [ ] family [x ] chart [x] staff [ ] other    Current Weight:   (8/30)  147.7 lbs   (8/20)  139.9 lbs     % Weight Change: No recent weight documented     Pertinent Medications: MEDICATIONS  (STANDING):  albumin human 25% IVPB 100 milliLiter(s) IV Intermittent every 12 hours  Biotene Dry Mouth Oral Rinse 5 milliLiter(s) Swish and Spit every 4 hours  ciprofloxacin     Tablet 500 milliGRAM(s) Oral daily  dextrose 5% 1000 milliLiter(s) (50 mL/Hr) IV Continuous <Continuous>  folic acid 1 milliGRAM(s) Oral daily  lactulose Syrup 10 Gram(s) Oral four times a day  lidocaine   4% Patch 1 Patch Transdermal every 24 hours  midodrine. 7.5 milliGRAM(s) Oral three times a day  octreotide  Injectable 100 MICROGram(s) SubCutaneous three times a day  pantoprazole    Tablet 40 milliGRAM(s) Oral before breakfast  potassium chloride  10 mEq/100 mL IVPB 10 milliEquivalent(s) IV Intermittent every 1 hour  rifAXIMin 550 milliGRAM(s) Oral two times a day  simethicone 80 milliGRAM(s) Chew daily  thiamine 100 milliGRAM(s) Oral daily    MEDICATIONS  (PRN):  aluminum hydroxide/magnesium hydroxide/simethicone Suspension 30 milliLiter(s) Oral every 4 hours PRN Dyspepsia  melatonin 3 milliGRAM(s) Oral at bedtime PRN Insomnia  ondansetron Injectable 4 milliGRAM(s) IV Push every 8 hours PRN Nausea and/or Vomiting    Pertinent Labs: CBC Full  -  ( 08 Sep 2022 06:10 )  WBC Count : 18.70 K/uL  RBC Count : 2.94 M/uL  Hemoglobin : 10.4 g/dL  Hematocrit : 28.9 %  Platelet Count - Automated : 55 K/uL  Mean Cell Volume : 98.3 fl  Mean Cell Hemoglobin : 35.4 pg  Mean Cell Hemoglobin Concentration : 36.0 gm/dL  09-08 Na124 mmol/L<L> Glu 151 mg/dL<H> K+ 3.3 mmol/L<L> Cr  2.70 mg/dL<H> BUN 78.4 mg/dL<H> Phos n/a   Alb 2.1 g/dL<L> PAB n/a       Skin: Jaundice    Nutrition focused physical exam previously conducted - found signs of malnutrition [ ]absent [ x]present    Subcutaneous fat loss: [x ] Orbital fat pads region, [ x]Buccal fat region, [ ]Triceps region,  [ ]Ribs region    Muscle wasting: [x ]Temples region, [x ]Clavicle region, [x ]Shoulder region, [ ]Scapula region, [ ]Interosseous region,  [ ]thigh region, [ ]Calf region    Estimated Needs:   [ x] no change since previous assessment  [ ] recalculated:     Current Nutrition Diagnosis: Pt remains at high nutrition risk and meets criteria for severe, chronic malnutrition related to inability to meet sufficient protein-energy in setting of decompensated alcoholic cirrhosis w/ascites as evidenced by mod/sev muscle loss, mod fat loss; 20% wt loss x ~2 yrs. Pt s/p paracentesis 9/1. Pt continues with good po intake, aware increasing confusion ?uremic encephalopathy 2/2 worsening renal function. Plan for richy today to initiate HD.     Recommendations:   1) Change to renal replacement, fluid restriction per MD  2) Nepro BID   3) Rx Nephro-jesus daily   4) Monitor weights daily for trend/accuracy   4) Encourage HBV protein sources      Monitoring and Evaluation:   [ x] PO intake [ x] Tolerance to diet prescription [X] Weights  [X] Follow up per protocol [X] Labs:

## 2022-09-08 NOTE — PROGRESS NOTE ADULT - ASSESSMENT
47y/oM PMH EtOH abuse, cirrhosis admitted for decompensated alcoholic cirrhosis, coagulopathy , had episode of epistaxis , now resolved.  started on spironolactone , lasix , cipro for sbp ppx , rifaximin , s/p 2900 cc fluid removal by IR 9/1.   noted to have bp on lower side. lasix and spironolactone held . also low grade temp tmax of 100.4 yesterday. no fever today. monitor for 24-48 hrs , if bp stable and no further episodes of fever can be dcd home pending nephro clearance. needs op hepatology  follow.       *Decompensated alcoholic cirrhosis w/ascites with EtOH abuse now ?? hepatorenal syndrome  WBC trending up  bcxs 8/26 neg x 2  peritoneal cxs 8/22 neg x 1   MRCP done showed cirrhosis with portal HTN and large ascites  s/p IR guided repeat  paracenteses 9/1   follow up op with hepatology  after dc   cont lactulose, rifaximin   9/1 hold lasix , spironolactone due to hypotension , can resume at lower dose prior to dc if bp stable   cont cipro 500mg qd indefinitely for ppx for SBP due to low total BF protein   s/p ceftriaxone on admission , now dcd   INR elevation again, c/w Vit K po x3 days (3/3)   Worsening LASHONDA, not a candidate for HD due to ineligibility for liver transplant  Bicarb drip  Hypokalemia - replete w/ IV potassium chloride  Discussed case w/ Dr. Boggs over the phone at length. Will try to initiate transfer to [place] for possible workup for transplant  If not able to transplant, will discuss GOC w/ patient using     *Hypotension   2/2 intravolume depletion  stable today  c/w Midodrine Q8  bp is slightly improved today   discussion with nephro, will cont hold diuretic    *epistaxis / coagulopathy , likely due to liver failure   dcd heparin sq for dvt ppx , scds only   s/p  cryoprecipitate , s/p po /iv  vit k , ffp. not much improved   no further bleeding , monitor new episodes   heme consulted , recommend to Check fibrinogen, if bleeding and  if platelets <50K - can transfuse 1-2 units platelets.  Also can give cryoprecipitate over FFP due to lesser volume.  transfuse platelets prior to planned procedures if plts <50K.    *Macrocytic anemia   likely due to etoh / liver dz   monitor h/h     *Hyponatremia with worsening LASHONDA / likely due to liver dz   suspect chronic in setting of EtOH abuse and diuretics use  s/p albumin x2   PO fluid restriction 1L daily    Continue to hold diuretics at this time   renal consult apprecaited   c/w Octreotide and increase Midodrine  f/u Nephro recs      *dvt ppx: scds   dispo: , sw/ cm for dc planning   Pt is worsening renal function, initiate transfer to [place] for transplant team, if rejected - Discuss GOC w/ patient.

## 2022-09-08 NOTE — PROGRESS NOTE ADULT - SUBJECTIVE AND OBJECTIVE BOX
[facility] Division of Hospital Medicine    Chief Complaint:  Decompensated Cirrhosis    SUBJECTIVE / OVERNIGHT EVENTS:  Spoke to vascular, nephro and hepatology specialists yesterday regarding plan of care. Pt seen at bedside, discussed poor prognosis w/ . Pt demonstrates moderate understanding, is hopeful for options of treatment. Patient denies chest pain, SOB, N/V, fever, chills, dysuria or any other complaints. All remainder ROS negative.     MEDICATIONS  (STANDING):  albumin human 25% IVPB 100 milliLiter(s) IV Intermittent every 12 hours  Biotene Dry Mouth Oral Rinse 5 milliLiter(s) Swish and Spit every 4 hours  ciprofloxacin     Tablet 500 milliGRAM(s) Oral daily  dextrose 5% 1000 milliLiter(s) (50 mL/Hr) IV Continuous <Continuous>  folic acid 1 milliGRAM(s) Oral daily  lactulose Syrup 10 Gram(s) Oral four times a day  lidocaine   4% Patch 1 Patch Transdermal every 24 hours  midodrine. 7.5 milliGRAM(s) Oral three times a day  octreotide  Injectable 100 MICROGram(s) SubCutaneous three times a day  pantoprazole    Tablet 40 milliGRAM(s) Oral before breakfast  potassium chloride  10 mEq/100 mL IVPB 10 milliEquivalent(s) IV Intermittent every 1 hour  rifAXIMin 550 milliGRAM(s) Oral two times a day  simethicone 80 milliGRAM(s) Chew daily  thiamine 100 milliGRAM(s) Oral daily    MEDICATIONS  (PRN):  aluminum hydroxide/magnesium hydroxide/simethicone Suspension 30 milliLiter(s) Oral every 4 hours PRN Dyspepsia  melatonin 3 milliGRAM(s) Oral at bedtime PRN Insomnia  ondansetron Injectable 4 milliGRAM(s) IV Push every 8 hours PRN Nausea and/or Vomiting        I&O's Summary    07 Sep 2022 07:01  -  08 Sep 2022 07:00  --------------------------------------------------------  IN: 960 mL / OUT: 1425 mL / NET: -465 mL        PHYSICAL EXAM:  Vital Signs Last 24 Hrs  T(C): 37 (08 Sep 2022 10:17), Max: 37 (08 Sep 2022 10:17)  T(F): 98.6 (08 Sep 2022 10:17), Max: 98.6 (08 Sep 2022 10:17)  HR: 59 (08 Sep 2022 10:17) (59 - 62)  BP: 118/75 (08 Sep 2022 10:17) (110/67 - 118/75)  BP(mean): --  RR: 18 (08 Sep 2022 10:17) (18 - 18)  SpO2: 100% (08 Sep 2022 10:17) (99% - 100%)    Parameters below as of 08 Sep 2022 10:17  Patient On (Oxygen Delivery Method): room air      PHYSICAL EXAM:  Constitutional: NAD,  jaundice   HEENT: sclera icterus, EOMI, PERRLA   Neck: Soft and supple,    Respiratory: Breath sounds are clear bilaterally,   Cardiovascular: S1 and S2, n  Gastrointestinal: Bowel Sounds present, soft, nontender, distended,    Extremities: No peripheral edema  Vascular: 2+ peripheral pulses  Neurological: A/O x 3,   Musculoskeletal: 5/5 strength b/l upper and lower extremities  Skin: No rashes, mild bruising from old paracentesis site on abdomen    LABS:                        10.4   18.70 )-----------( 55       ( 08 Sep 2022 06:10 )             28.9     09-08    124<L>  |  92<L>  |  78.4<H>  ----------------------------<  151<H>  3.3<L>   |  14.0<L>  |  2.70<H>    Ca    7.5<L>      08 Sep 2022 06:10    TPro  4.9<L>  /  Alb  2.1<L>  /  TBili  36.1<H>  /  DBili  x   /  AST  98<H>  /  ALT  75<H>  /  AlkPhos  327<H>  09-08    PT/INR - ( 08 Sep 2022 06:10 )   PT: 33.1 sec;   INR: 2.82 ratio             CAPILLARY BLOOD GLUCOSE        RADIOLOGY & ADDITIONAL TESTS:  Results Reviewed: Y  Imaging Personally Reviewed: N  Electrocardiogram Personally Reviewed: N

## 2022-09-08 NOTE — PROGRESS NOTE ADULT - SUBJECTIVE AND OBJECTIVE BOX
Seen sleeping in bed; wakes up to voice. Remains deeply icteric,     Not in Any Distress,     Vital Signs Last 48 Hrs,    T(C): 37 (08 Sep 2022 10:17), Max: 37 (08 Sep 2022 10:17)  T(F): 98.6 (08 Sep 2022 10:17), Max: 98.6 (08 Sep 2022 10:17)  HR: 59 (08 Sep 2022 10:17) (59 - 62)  BP: 118/75 (08 Sep 2022 10:17) (110/67 - 118/75)  RR: 18 (08 Sep 2022 10:17) (18 - 18)  SpO2: 100% (08 Sep 2022 10:17) (99% - 100%)    O2 Parameters below as of 08 Sep 2022 10:17  Patient On (Oxygen Delivery Method): room air    T(C): 36.4 (07 Sep 2022 15:45), Max: 36.4 (06 Sep 2022 21:11)  T(F): 97.5 (07 Sep 2022 15:45), Max: 97.6 (06 Sep 2022 21:11)  HR: 62 (07 Sep 2022 15:45) (61 - 67)  BP: 111/71 (07 Sep 2022 15:45) (105/52 - 115/55)  RR: 18 (07 Sep 2022 15:45) (18 - 18)  SpO2: 100% (07 Sep 2022 15:45) (99% - 100%)    Parameters below as of 07 Sep 2022 15:45  Patient On (Oxygen Delivery Method): room air    I&O's Summary  06 Sep 2022 07:01  -  07 Sep 2022 07:00  --------------------------------------------------------  IN: 0 mL / OUT: 800 mL / NET: -800 mL  07 Sep 2022 07:01  -  07 Sep 2022 16:35  --------------------------------------------------------  IN: 360 mL / OUT: 1100 mL / NET: -740 mL    Physical Exam  General: WDWN male in NAD, Icteric,   Cardiac: S1S2 RRR  Respiratory: CTAB  Abdomen: Distended, non tender  Extremities: No edema  Neuro: Awake, confused , + Asterixis,     124<L>  |  92<L>  |  78.4<H>  ----------------------------<  151<H>  Ca:7.5<L> (08 Sep 2022 06:10)  3.3<L>   |  14.0<L>  |  2.70<H>    TPro  4.9<L>  /  Alb  2.1<L>  /  TBili  36.1<H>  /  DBili  x      /  AST  98<H>  /  ALT  75<H>  /  AlkPhos  327<H>  08 Sep 2022 06:10                              10.4<L>  18.70<H> )-----------( 55<L>    ( 08 Sep 2022 06:10 )                  28.9<L>    Serum Osm:298 mosmol/kg (09-02 @ 17:44)    Iqra:<30 mmol/L UOsm:342 mosm/kg  (09-01 @ 20:20)    09-07    123<L>  |  94<L>  |  67.9<H>  ----------------------------<  138<H>  3.5   |  14.0<L>  |  1.98<H>    Ca    7.4<L>      07 Sep 2022 06:34    TPro  5.0<L>  /  Alb  2.1<L>  /  TBili  36.7<H>  /  DBili  x   /  AST  100<H>  /  ALT  85<H>  /  AlkPhos  329<H>  09-07                          10.5   18.55 )-----------( 54       ( 07 Sep 2022 06:34 )             28.9     MEDICATIONS  (STANDING):  Biotene Dry Mouth Oral Rinse 5 milliLiter(s) Swish and Spit every 4 hours  ciprofloxacin     Tablet 500 milliGRAM(s) Oral daily  folic acid 1 milliGRAM(s) Oral daily  lactulose Syrup 10 Gram(s) Oral four times a day  lidocaine   4% Patch 1 Patch Transdermal every 24 hours  midodrine. 7.5 milliGRAM(s) Oral three times a day  octreotide  Injectable 100 MICROGram(s) SubCutaneous three times a day  pantoprazole    Tablet 40 milliGRAM(s) Oral before breakfast  rifAXIMin 550 milliGRAM(s) Oral two times a day  simethicone 80 milliGRAM(s) Chew daily  thiamine 100 milliGRAM(s) Oral daily    MEDICATIONS  (PRN):  aluminum hydroxide/magnesium hydroxide/simethicone Suspension 30 milliLiter(s) Oral every 4 hours PRN Dyspepsia  melatonin 3 milliGRAM(s) Oral at bedtime PRN Insomnia  ondansetron Injectable 4 milliGRAM(s) IV Push every 8 hours PRN Nausea and/or Vomiting    the patient was started on Aldactone 100 mg daily and today the creatinine continues normal at 1.0 but the BUN is slightly elevated at 44.  He has no specific complaints.  On exam he has moderate distention which is soft in nature.  His LFTs are essentially unchanged and he is markedly jaundiced.  He should continue with the current medical regimen.  The patient had continued to drink up until the time of his admission to [facility] in early August.  I highly doubt he is a transplant candidate but would recommend that he undergo further evaluation by the hepatologist in our office, Dr. Boggs. Otherwise there is nothing further to offer this patient.Acute Kidney Injury    No indication for emergent dialysis    Sodium, Serum: 124 mmol/L (09.08.22 @ 06:10)       Historical Values  Sodium, Serum: 124 mmol/L (09.08.22 @ 06:10)   Sodium, Serum: 123 mmol/L (09.07.22 @ 06:34)   Sodium, Serum: 127 mmol/L (09.06.22 @ 04:14)   Sodium, Serum: 124 mmol/L (09.05.22 @ 05:15)   Sodium, Serum: 126 mmol/L (09.04.22 @ 17:22)   Sodium, Serum: 129 mmol/L (09.03.22 @ 05:05)   Sodium, Serum: 129 mmol/L (09.02.22 @ 05:35)   Sodium, Serum: 128 mmol/L (09.01.22 @ 06:23)   Sodium, Serum: 129 mmol/L (08.31.22 @ 06:10)   Sodium, Serum: 128 mmol/L (08.30.22 @ 06:24)     US Renal (09.01.22 @ 19:28)     ACC: [id number] EXAM:  US KIDNEY(S)                          PROCEDURE DATE:  09/01/2022      INTERPRETATION:  CLINICAL INFORMATION: Acute kidney injury    COMPARISON: CT abdomen and pelvis 8/21/2022    TECHNIQUE: Sonography of the kidneys and bladder.    FINDINGS:  Right kidney: 12.9 cm. Increased echogenicity. No hydronephrosis. No   renal mass or calculus visualized.    Left kidney: 14.8 cm. Increased echogenicity. No hydronephrosis. No renal   mass or calculus visualized.    Urinary bladder: Underdistended and incompletely visualized.    Additional findings: Partially imaged liver demonstrating a coarsened   echotexture and nodular contour, in keeping with known cirrhosis. Small   volume ascites.    IMPRESSION:    Increased renal echogenicity, suggesting medical renal disease. No   hydronephrosis.    YARELI DENT MD; Attending Radiologist  This document has been electronically signed. Sep  1 2022  7:38PM  Creatinine, Serum: 2.70 mg/dL (09.08.22 @ 06:10)   Historical Values  Creatinine, Serum: 2.70 mg/dL (09.08.22 @ 06:10)   Creatinine, Serum: 1.98: Icteric. Interpret with caution mg/dL (09.07.22 @ 06:34)   Creatinine, Serum: 1.67: Icteric. Interpret with caution mg/dL (09.06.22 @ 04:14)   Creatinine, Serum: 1.34: Icteric. Interpret with caution mg/dL (09.05.22 @ 05:15)   Creatinine, Serum: 1.06: Icteric. Interpret with caution mg/dL (09.04.22 @ 17:22)   Creatinine, Serum: 1.08 mg/dL (09.03.22 @ 05:05)   Creatinine, Serum: 0.57: Icteric. Interpret with caution mg/dL (09.02.22 @ 05:35)

## 2022-09-09 DIAGNOSIS — E87.6 HYPOKALEMIA: ICD-10-CM

## 2022-09-09 DIAGNOSIS — E87.1 HYPO-OSMOLALITY AND HYPONATREMIA: ICD-10-CM

## 2022-09-09 DIAGNOSIS — K70.40 ALCOHOLIC HEPATIC FAILURE WITHOUT COMA: ICD-10-CM

## 2022-09-09 DIAGNOSIS — F10.239 ALCOHOL DEPENDENCE WITH WITHDRAWAL, UNSPECIFIED: ICD-10-CM

## 2022-09-09 NOTE — CHART NOTE - NSCHARTNOTEFT_GEN_A_CORE
PA NOTE-MEDICINE    Asked to Follow up INR s/p 1 U FFP earlier today.  PT/INR - ( 09 Sep 2022 21:36 )   PT: 31.9 sec;   INR: 2.72 ratio    Ordered 1 Additional U FFP Stat    Repeat Coags in AM  Q 6 H/H Ordered   Stat CBC Still Pending     Continue to monitor Pt   Recall PA if any changes in pt status

## 2022-09-09 NOTE — PROGRESS NOTE ADULT - ASSESSMENT
47y/oM PMH EtOH abuse, cirrhosis admitted for decompensated alcoholic cirrhosis, coagulopathy , had episode of epistaxis , now resolved.  started on spironolactone , lasix , cipro for sbp ppx , rifaximin , s/p 2900 cc fluid removal by IR 9/1.   noted to have bp on lower side. lasix and spironolactone held . also low grade temp tmax of 100.4 yesterday. no fever today. monitor for 24-48 hrs , if bp stable and no further episodes of fever can be dcd home pending nephro clearance. needs op hepatology  follow.       *Decompensated alcoholic cirrhosis w/ascites with EtOH abuse now ?? hepatorenal syndrome  WBC trending up  bcxs 8/26 neg x 2  peritoneal cxs 8/22 neg x 1   MRCP done showed cirrhosis with portal HTN and large ascites  s/p IR guided repeat  paracenteses 9/1   follow up op with hepatology  after dc   cont lactulose, rifaximin   9/1 hold lasix , spironolactone due to hypotension , can resume at lower dose prior to dc if bp stable   cont cipro 500mg qd indefinitely for ppx for SBP due to low total BF protein   s/p ceftriaxone on admission , now dcd   INR elevation again, c/w Vit K po x3 days (3/3)   Worsening LASHONDA, not a candidate for HD due to ineligibility for liver transplant  Bicarb drip  Hypokalemia - replete w/ IV potassium chloride  Discussed case w/ Dr. Boggs over the phone at length. rejected from [facility] for transport  IR - Paracentesis today  Palliative consult for GOC discussion  Possible Hospice    *Hypotension   2/2 intravolume depletion  stable today  c/w Midodrine Q8  bp is slightly improved today   discussion with nephro, will cont hold diuretic    *epistaxis / coagulopathy , likely due to liver failure   dcd heparin sq for dvt ppx , scds only   s/p  cryoprecipitate , s/p po /iv  vit k , ffp. not much improved   no further bleeding , monitor new episodes   heme consulted , recommend to Check fibrinogen, if bleeding and  if platelets <50K - can transfuse 1-2 units platelets.  Also can give cryoprecipitate over FFP due to lesser volume.  transfuse platelets prior to planned procedures if plts <50K.    *Macrocytic anemia   likely due to etoh / liver dz   monitor h/h     *Hyponatremia with worsening LASHONDA / likely due to liver dz   suspect chronic in setting of EtOH abuse and diuretics use  s/p albumin x2   PO fluid restriction 1L daily    Continue to hold diuretics at this time   renal consult apprecaited   c/w Octreotide and increase Midodrine  f/u Nephro recs      *dvt ppx: scds   dispo: , sw/ cm for dc planning   Pt is worsening renal function Discuss GOC w/ patient, likely hospice

## 2022-09-09 NOTE — PROGRESS NOTE ADULT - ASSESSMENT
This is a 47 year old male with end stage liver disease    Problem/Recommendation 1:Cirrhosis  MRCP significant for Cirrhotic liver with portal hypertension and large ascites.  Decompensated alcoholic cirrhosis +ascites  GI following , c/w recs  Worsening LASHONDA, not a candidate for HD due to ineligibility for liver transplant    Problem/Recommendation 2:Ascites  s/p paracentesis   to continue if patient is able to tolerate procedure (currently has high INR)    Problem/Recommendation 3: Debility/weakness  Assist in ADLS  Maintain safety, fall, aspiration precautions    Problem/Recommendation 4: Palliative Care Encounter  Met with patient along with Surinamese  from [facility]  Discussed overall diagnosis and poor prognosis related to his extensive liver cirrhosis. Discussed eligibility for hospice and explained at length the hospice at [facility] - He was receptive to hospice support at [facility]   Explained to him the philosophy of hospice and palliative care with focus on symptom management for quality of life - patient expressed understanding    Re-addressed code status- Discussed code status at length. Explained the difference between Do Not Resuscitate/Do Not Intubate versus CPR and Intubation. At the end of our conversation decision was made for CPR and Intubation (FULL CODE)    Re-addressed HCP as during prior encounter he refused to declare a person. Explained at length the importance of appointing someone in the event he cannot make health care decisions for himself.    Patient has family who live in [facility] but he doesn't know their contact number. He also expressed he has a close friend who lives nearby - at this time he does not want to complete HCP form. Informed him of the critical importance of having this form completed given his extensive disease - also informed him without family to contact then 2 providers may have to make end of life decisions for him if he loses capacity- he expressed acknowledgement and understanding     Spoke to Kimberly Unger from [facility] [place] inpatient unit for hospice requires DNR/DNI status - may need further dispo planning     Palliative recs: If patient changes his GOC and wants to focus on conservative care only for symptom management and wishes for code status to DNR DNI can consider transitioning him to comfort measures only    Can also consider inpatient hospice as a iva case if approved by agency    Total Time Spent__65__ minutes  This includes chart review, patient assessment, discussion and collaboration with interdisciplinary team members, ACP planning    COUNSELING:  Face to face meeting to discuss Advanced Care Planning - Time Spent __35____Minutes.      Thank you for the opportunity to assist with the care of this patient.   [facility] Palliative Medicine Consult Service [phone].  This is a 47 year old male with end stage liver disease    Problem/Recommendation 1:Cirrhosis  MRCP significant for Cirrhotic liver with portal hypertension and large ascites.  Decompensated alcoholic cirrhosis +ascites  GI following , c/w recs  Worsening LASHONDA, not a candidate for HD due to ineligibility for liver transplant    Problem/Recommendation 2:Ascites  s/p paracentesis   to continue if patient is able to tolerate procedure (currently has high INR)    Problem/Recommendation 3: Debility/weakness  Assist in ADLS  Maintain safety, fall, aspiration precautions    Problem/Recommendation 4: Palliative Care Encounter  Met with patient along with Dominican  from [facility]  Discussed overall diagnosis and poor prognosis related to his extensive liver cirrhosis. Discussed eligibility for hospice and explained at length the hospice at [facility] - He was receptive to hospice support at [facility]   Explained to him the philosophy of hospice and palliative care with focus on symptom management for quality of life - patient expressed understanding    Re-addressed code status- Discussed code status at length. Explained the difference between Do Not Resuscitate/Do Not Intubate versus CPR and Intubation. At the end of our conversation decision was made for CPR and Intubation (FULL CODE)    Re-addressed HCP as during prior encounter he refused to declare a person. Explained at length the importance of appointing someone in the event he cannot make health care decisions for himself.    Patient has family who live in [facility] but he doesn't know their contact number. He also expressed he has a close friend who lives nearby - at this time he does not want to complete HCP form. Informed him of the critical importance of having this form completed given his extensive disease - also informed him without family to contact then 2 providers may have to make end of life decisions for him if he loses capacity- he expressed acknowledgement and understanding     Spoke to Kimberly Unger from [facility] [place] inpatient unit for hospice requires DNR/DNI status - may need further dispo planning     Palliative recs: If patient changes his GOC and wants to focus on conservative care only for symptom management and wishes for code status to DNR DNI can consider transitioning him to comfort measures only    Can also consider inpatient hospice as a iva case if approved by agency    GOC have been established, no further needs to be addressed from Palliative care perspective.  Will sign off at this time. Please reconsult if GOC change or condition decompensates requiring further palliative care assistance.  HCP/Surrogate: Patient declines to appoint   Code Status:      FULL CODE                                                          Total Time Spent__65__ minutes  This includes chart review, patient assessment, discussion and collaboration with interdisciplinary team members, ACP planning    COUNSELING:  Face to face meeting to discuss Advanced Care Planning - Time Spent __35____Minutes.      Thank you for the opportunity to assist with the care of this patient.   [facility] Palliative Medicine Consult Service [phone].  This is a 47 year old male with end stage liver disease    Problem/Recommendation 1:Cirrhosis  MRCP significant for Cirrhotic liver with portal hypertension and large ascites.  Decompensated alcoholic cirrhosis +ascites  GI following , c/w recs  Worsening LASHONDA, not a candidate for HD due to ineligibility for liver transplant    Problem/Recommendation 2:Ascites  s/p paracentesis   to continue if patient is able to tolerate procedure (currently has high INR)    Problem/Recommendation 3: Debility/weakness  Assist in ADLS  Maintain safety, fall, aspiration precautions    Problem/Recommendation 4: Palliative Care Encounter  Met with patient along with Kittitian  from [facility]  Discussed overall diagnosis and poor prognosis related to his extensive liver cirrhosis. Discussed eligibility for hospice and explained at length the hospice at [facility] - He was receptive to hospice support at [facility]   Explained to him the philosophy of hospice and palliative care with focus on symptom management for quality of life - patient expressed understanding    Re-addressed code status- Discussed code status at length. Explained the difference between Do Not Resuscitate/Do Not Intubate versus CPR and Intubation. At the end of our conversation decision was made for CPR and Intubation (FULL CODE)    Re-addressed HCP as during prior encounter he refused to declare a person. Explained at length the importance of appointing someone in the event he cannot make health care decisions for himself.    Patient has family who live in [facility] but he doesn't know their contact number. He also expressed he has a close friend who lives nearby - at this time he does not want to complete HCP form. Informed him of the critical importance of having this form completed given his extensive disease - also informed him without family to contact then 2 providers may have to make end of life decisions for him if he loses capacity- he expressed acknowledgement and understanding     Spoke to Kimberly Unger from [facility] [place] inpatient unit for hospice requires DNR/DNI status - may need further dispo planning     Palliative recs: If patient changes his GOC and wants to focus on conservative care only for symptom management and wishes for code status to DNR DNI can consider transitioning him to comfort measures only    If symptoms progress Can also consider inpatient hospice if approved by agency - would recommend hospice consult to review     GOC have been established, no further needs to be addressed from Palliative care perspective.  Will sign off at this time. Please reconsult if GOC change or condition decompensates requiring further palliative care assistance.  HCP/Surrogate: Patient declines to appoint   Code Status:      FULL CODE                                                          Total Time Spent__65__ minutes  This includes chart review, patient assessment, discussion and collaboration with interdisciplinary team members, ACP planning    COUNSELING:  Face to face meeting to discuss Advanced Care Planning - Time Spent __35____Minutes.      Thank you for the opportunity to assist with the care of this patient.   [facility] Palliative Medicine Consult Service [phone].

## 2022-09-09 NOTE — PROGRESS NOTE ADULT - SUBJECTIVE AND OBJECTIVE BOX
Palliative Care Re-Consult    OVERNIGHT EVENTS: no acute overnight events -    Present Symptoms:   Dyspnea: no  nausea/vomiting: no  Depression: No  Fatigue: No  Loss of appetite: Yes   Constipation: no    Pain: Denies pain            Character-            Duration-            Effect-            Factors-            Frequency-            Location-            Severity-    Pain AD Score:0  [email or web address] (press ctrl + left click to view)    Review of Systems: Reviewed                     Negative:no pain  All others negative    MEDICATIONS  (STANDING):  albumin human 25% IVPB 100 milliLiter(s) IV Intermittent every 12 hours  Biotene Dry Mouth Oral Rinse 5 milliLiter(s) Swish and Spit every 4 hours  ciprofloxacin     Tablet 500 milliGRAM(s) Oral daily  dextrose 5% 1000 milliLiter(s) (50 mL/Hr) IV Continuous <Continuous>  folic acid 1 milliGRAM(s) Oral daily  lactulose Syrup 10 Gram(s) Oral four times a day  lidocaine   4% Patch 1 Patch Transdermal every 24 hours  midodrine. 7.5 milliGRAM(s) Oral three times a day  octreotide  Injectable 100 MICROGram(s) SubCutaneous three times a day  pantoprazole    Tablet 40 milliGRAM(s) Oral before breakfast  rifAXIMin 550 milliGRAM(s) Oral two times a day  simethicone 80 milliGRAM(s) Chew daily  thiamine 100 milliGRAM(s) Oral daily    MEDICATIONS  (PRN):  aluminum hydroxide/magnesium hydroxide/simethicone Suspension 30 milliLiter(s) Oral every 4 hours PRN Dyspepsia  melatonin 3 milliGRAM(s) Oral at bedtime PRN Insomnia  ondansetron Injectable 4 milliGRAM(s) IV Push every 8 hours PRN Nausea and/or Vomiting      PHYSICAL EXAM:    Vital Signs Last 24 Hrs  T(C): 36.4 (09 Sep 2022 10:39), Max: 36.9 (08 Sep 2022 22:45)  T(F): 97.5 (09 Sep 2022 10:39), Max: 98.4 (08 Sep 2022 22:45)  HR: 65 (09 Sep 2022 10:39) (60 - 65)  BP: 108/67 (09 Sep 2022 10:39) (101/58 - 112/73)  BP(mean): --  RR: 18 (09 Sep 2022 10:39) (18 - 18)  SpO2: 100% (09 Sep 2022 10:39) (99% - 100%)    Parameters below as of 09 Sep 2022 10:39  Patient On (Oxygen Delivery Method): room air        General: alert  oriented x __3    Karnofsky:  %60    HEENT: jaundice of sclera    Lungs: comfortable     CV: normal      GI: distended abd    : darkened urine    MSK: weakness     Skin:jaundice    LABS:                          9.2    16.14 )-----------( 41       ( 09 Sep 2022 07:10 )             25.1         124<L>  |  89<L>  |  82.4<H>  ----------------------------<  136<H>  3.4<L>   |  16.0<L>  |  2.95<H>    Ca    7.1<L>      09 Sep 2022 07:10  Mg     2.3       TPro  4.9<L>  /  Alb  2.8<L>  /  TBili  40.3<H>  /  DBili  x   /  AST  88<H>  /  ALT  65<H>  /  AlkPhos  294<H>    PT/INR - ( 09 Sep 2022 07:10 )   PT: 33.0 sec;   INR: 2.82 ratio    Urinalysis Basic - ( 08 Sep 2022 16:00 )  Color: Yellow / Appearance: Clear / S.010 / pH: x  Gluc: x / Ketone: Negative  / Bili: Large / Urobili: 4   Blood: x / Protein: Trace mg/dL / Nitrite: Positive   Leuk Esterase: Trace / RBC: 3-5 /HPF / WBC 3-5 /HPF   Sq Epi: x / Non Sq Epi: Few / Bacteria: Few    I&O's Summary    RADIOLOGY & ADDITIONAL STUDIES:  MRCP 22  IMPRESSION: Cirrhotic liver with portal hypertension and large ascites.    ADVANCE DIRECTIVES/TREATMENT PREFERENCES:  Full Code Palliative Care Re-Consult to discuss Code status and Hospice    OVERNIGHT EVENTS: no acute overnight events -    Present Symptoms:   Dyspnea: no  nausea/vomiting: no  Depression: No  Fatigue: No  Loss of appetite: Yes   Constipation: no    Pain: Denies pain            Character-            Duration-            Effect-            Factors-            Frequency-            Location-            Severity-    Pain AD Score:0  [email or web address] (press ctrl + left click to view)    Review of Systems: Reviewed                     Negative:no pain  All others negative    MEDICATIONS  (STANDING):  albumin human 25% IVPB 100 milliLiter(s) IV Intermittent every 12 hours  Biotene Dry Mouth Oral Rinse 5 milliLiter(s) Swish and Spit every 4 hours  ciprofloxacin     Tablet 500 milliGRAM(s) Oral daily  dextrose 5% 1000 milliLiter(s) (50 mL/Hr) IV Continuous <Continuous>  folic acid 1 milliGRAM(s) Oral daily  lactulose Syrup 10 Gram(s) Oral four times a day  lidocaine   4% Patch 1 Patch Transdermal every 24 hours  midodrine. 7.5 milliGRAM(s) Oral three times a day  octreotide  Injectable 100 MICROGram(s) SubCutaneous three times a day  pantoprazole    Tablet 40 milliGRAM(s) Oral before breakfast  rifAXIMin 550 milliGRAM(s) Oral two times a day  simethicone 80 milliGRAM(s) Chew daily  thiamine 100 milliGRAM(s) Oral daily    MEDICATIONS  (PRN):  aluminum hydroxide/magnesium hydroxide/simethicone Suspension 30 milliLiter(s) Oral every 4 hours PRN Dyspepsia  melatonin 3 milliGRAM(s) Oral at bedtime PRN Insomnia  ondansetron Injectable 4 milliGRAM(s) IV Push every 8 hours PRN Nausea and/or Vomiting      PHYSICAL EXAM:    Vital Signs Last 24 Hrs  T(C): 36.4 (09 Sep 2022 10:39), Max: 36.9 (08 Sep 2022 22:45)  T(F): 97.5 (09 Sep 2022 10:39), Max: 98.4 (08 Sep 2022 22:45)  HR: 65 (09 Sep 2022 10:39) (60 - 65)  BP: 108/67 (09 Sep 2022 10:39) (101/58 - 112/73)  BP(mean): --  RR: 18 (09 Sep 2022 10:39) (18 - 18)  SpO2: 100% (09 Sep 2022 10:39) (99% - 100%)    Parameters below as of 09 Sep 2022 10:39  Patient On (Oxygen Delivery Method): room air        General: alert  oriented x __3    Karnofsky:  %60    HEENT: jaundice of sclera    Lungs: comfortable     CV: normal      GI: distended abd    : darkened urine    MSK: weakness     Skin:jaundice    LABS:                          9.2    16.14 )-----------( 41       ( 09 Sep 2022 07:10 )             25.1         124<L>  |  89<L>  |  82.4<H>  ----------------------------<  136<H>  3.4<L>   |  16.0<L>  |  2.95<H>    Ca    7.1<L>      09 Sep 2022 07:10  Mg     2.3       TPro  4.9<L>  /  Alb  2.8<L>  /  TBili  40.3<H>  /  DBili  x   /  AST  88<H>  /  ALT  65<H>  /  AlkPhos  294<H>    PT/INR - ( 09 Sep 2022 07:10 )   PT: 33.0 sec;   INR: 2.82 ratio    Urinalysis Basic - ( 08 Sep 2022 16:00 )  Color: Yellow / Appearance: Clear / S.010 / pH: x  Gluc: x / Ketone: Negative  / Bili: Large / Urobili: 4   Blood: x / Protein: Trace mg/dL / Nitrite: Positive   Leuk Esterase: Trace / RBC: 3-5 /HPF / WBC 3-5 /HPF   Sq Epi: x / Non Sq Epi: Few / Bacteria: Few    I&O's Summary    RADIOLOGY & ADDITIONAL STUDIES:  MRCP 22  IMPRESSION: Cirrhotic liver with portal hypertension and large ascites.    ADVANCE DIRECTIVES/TREATMENT PREFERENCES:  Full Code

## 2022-09-09 NOTE — PROGRESS NOTE ADULT - NS ATTEST RISK PROBLEM GEN_ALL_CORE FT
End Stage Liver Disease requiring Invasive procedures, monitoring and low threshold for MICU consult
Worsening severe hyponatremia
47M with decompensated cirrhosis and possible underling hepatitis p/w jaundice and abdominal bloating. s/p paracentesis and no SBP. Leukocytosis is likely related steroid therapy. He is on rifaximin and lactulose. Cirrhotic appearing liver on imaging. He has very low total protein in body fluid which will put him at high risk for SBP.       Recommendation:   Continue PO steroid and check Lille score on day 7 of the steroid therapy if more than 0.45 d/c steroid.   Off diuretics due to hyponatremia   Awaiting MRCP   Encourage PO diet  Continue Etoh abstinence   Rifaximin and lactulose to have 2-2 soft BM  HCC surveillance q 6 months  EGD/Colon as OP   Po ciprofloxacin 500mg daily indefinitely for primary prophylaxis against SBP due to low total BF protein.   Prognosis remain poor.   GI will follow
Decompensated cirrhosis now w/ renal injury, worsening, poor prognosis, hospice consult
liver failure, coagulopathy
Decompensated cirrhosis now w/ renal injury, likely needing HD
Decompensated cirrhosis now w/ renal injury
Monitoring for decompensation in the setting of ETOH cirrhosis
Prescription drug mgmt
Worsening severe hyponatremia
Decompensated cirrhosis now w/ renal injury, needs transplant, ineligible for HD
liver failure
liver failure , epistaxis

## 2022-09-09 NOTE — PROGRESS NOTE ADULT - NS ATTEST RISK GEN_ALL_CORE
Risk Statement (NON-critical care)

## 2022-09-09 NOTE — PROGRESS NOTE ADULT - SUBJECTIVE AND OBJECTIVE BOX
[facility] Division of Hospital Medicine    Chief Complaint:  Decompensated Cirrhosis    SUBJECTIVE / OVERNIGHT EVENTS:  Transfer rejected yesterday. Pt seen at bedside. Denies chest pain, SOB, N/V, fever, chills, dysuria or any other complaints. All remainder ROS negative.     MEDICATIONS  (STANDING):  albumin human 25% IVPB 100 milliLiter(s) IV Intermittent every 12 hours  Biotene Dry Mouth Oral Rinse 5 milliLiter(s) Swish and Spit every 4 hours  ciprofloxacin     Tablet 500 milliGRAM(s) Oral daily  dextrose 5% 1000 milliLiter(s) (50 mL/Hr) IV Continuous <Continuous>  folic acid 1 milliGRAM(s) Oral daily  lactulose Syrup 10 Gram(s) Oral four times a day  lidocaine   4% Patch 1 Patch Transdermal every 24 hours  midodrine. 7.5 milliGRAM(s) Oral three times a day  octreotide  Injectable 100 MICROGram(s) SubCutaneous three times a day  pantoprazole    Tablet 40 milliGRAM(s) Oral before breakfast  rifAXIMin 550 milliGRAM(s) Oral two times a day  simethicone 80 milliGRAM(s) Chew daily  thiamine 100 milliGRAM(s) Oral daily    MEDICATIONS  (PRN):  aluminum hydroxide/magnesium hydroxide/simethicone Suspension 30 milliLiter(s) Oral every 4 hours PRN Dyspepsia  melatonin 3 milliGRAM(s) Oral at bedtime PRN Insomnia  ondansetron Injectable 4 milliGRAM(s) IV Push every 8 hours PRN Nausea and/or Vomiting        I&O's Summary      PHYSICAL EXAM:  Vital Signs Last 24 Hrs  T(C): 36.4 (09 Sep 2022 10:39), Max: 36.9 (08 Sep 2022 22:45)  T(F): 97.5 (09 Sep 2022 10:39), Max: 98.4 (08 Sep 2022 22:45)  HR: 65 (09 Sep 2022 10:39) (60 - 65)  BP: 108/67 (09 Sep 2022 10:39) (101/58 - 112/73)  BP(mean): --  RR: 18 (09 Sep 2022 10:39) (18 - 18)  SpO2: 100% (09 Sep 2022 10:39) (99% - 100%)    Parameters below as of 09 Sep 2022 10:39  Patient On (Oxygen Delivery Method): room air          PHYSICAL EXAM:  Constitutional: NAD,  jaundice   HEENT: sclera icterus, EOMI, PERRLA   Neck: Soft and supple,    Respiratory: Breath sounds are clear bilaterally,   Cardiovascular: S1 and S2, n  Gastrointestinal: Bowel Sounds present, soft, nontender, distended,    Extremities: No peripheral edema  Vascular: 2+ peripheral pulses  Neurological: A/O x 3,   Musculoskeletal: 5/5 strength b/l upper and lower extremities  Skin: No rashes, mild bruising from old paracentesis site on abdomen    LABS:                        9.2    16.14 )-----------( 41       ( 09 Sep 2022 07:10 )             25.1         124<L>  |  89<L>  |  82.4<H>  ----------------------------<  136<H>  3.4<L>   |  16.0<L>  |  2.95<H>    Ca    7.1<L>      09 Sep 2022 07:10  Mg     2.3         TPro  4.9<L>  /  Alb  2.8<L>  /  TBili  40.3<H>  /  DBili  x   /  AST  88<H>  /  ALT  65<H>  /  AlkPhos  294<H>      PT/INR - ( 09 Sep 2022 07:10 )   PT: 33.0 sec;   INR: 2.82 ratio               Urinalysis Basic - ( 08 Sep 2022 16:00 )    Color: Yellow / Appearance: Clear / S.010 / pH: x  Gluc: x / Ketone: Negative  / Bili: Large / Urobili: 4   Blood: x / Protein: Trace mg/dL / Nitrite: Positive   Leuk Esterase: Trace / RBC: 3-5 /HPF / WBC 3-5 /HPF   Sq Epi: x / Non Sq Epi: Few / Bacteria: Few        CAPILLARY BLOOD GLUCOSE            RADIOLOGY & ADDITIONAL TESTS:  Results Reviewed: y  Imaging Personally Reviewed: n  Electrocardiogram Personally Reviewed: judy

## 2022-09-10 NOTE — PROGRESS NOTE ADULT - ASSESSMENT
47y/oM PMH EtOH abuse, cirrhosis admitted for decompensated alcoholic cirrhosis, coagulopathy, had episode of epistaxis , now resolved.  started on spironolactone , Lasix , Cipro for SBP ppx , rifaximin ,  s/p 2900 cc fluid removal by IR 9/1.   noted to have hypotension Lasix and spironolactone held .   also low grade temp t-max of 100.4 on 9/8      Decompensated alcoholic cirrhosis w/ascites with ETOH /Hepato- renal syndrome/coagulopathy  Blood culture 8/26 neg x 2  Ascitic fluid -cxs 8/22 neg x 1   MRCP done showed cirrhosis with portal HTN and large ascites  s/p IR guided repeat  paracenteses 9/1   follow up op with hepatology after discharge   cont lactulose, rifaximin   9/1- Lasix , spironolactone due to hypotension,   cont cipro 500mg qd indefinitely for ppx for SBP due to low total BF protein   s/p ceftriaxone on admission  coagulopathy- c/w Vit K po x3 days (3/3)     Worsening Renal failure- metabolic acidosis   not a candidate for HD due to ineligibility for liver transplant  Bicarb drip  Hypokalemia - replete w/ IV potassium chloride  Discussed case w/ Dr. Boggs over the phone at length   rejected from [place] for transport  IR - Paracentesis done  Palliative consult for GOC discussion  Possible Hospice    Hypotension   2/2 intravascular volume depletion  c/w Midodrine Q8  discussion with nephro, will cont hold diuretic    Coagulopathy with liver cirrhosis-Epistaxis  Discontinued heparin sq for dvt ppx , scds only   s/p  cryoprecipitate , s/p po /iv  vit k , ffp.   no further bleeding , monitor for new episodes   heme consulted , recommend to Check fibrinogen, if bleeding and  if platelets <50K - can transfuse 1-2 units platelets.  Also can give cryoprecipitate over FFP due to lesser volume.    Transfuse platelets prior to planned procedures if plts <50K.    Macrocytic anemia   likely due to ETOH / liver disease     Hyponatremia with worsening LASHONDA / likely due to liver    suspect chronic in setting of ETOH abuse and diuretics use  s/p albumin x2   PO fluid restriction 1L daily    Continue to hold diuretics at this time   renal consult appreciated   c/w Octreotide and increase Midodrine  f/u Nephro recs    Severe protein-calorie malnutrition.    Venodyne boots bilateral     Dispo: [facility] for dc planning

## 2022-09-10 NOTE — PROGRESS NOTE ADULT - SUBJECTIVE AND OBJECTIVE BOX
TODD DICKINSON Patient is a 47y old  Male who presents with a chief complaint of Cirrhosis 2/2 ETOH (09 Sep 2022 12:49)     HPI:  48 y/o M w/ a hx of ?Deep Vein Thrombosis (not on AC as per pt), Hypertension, Alcohol use disorder, liver failure transferred to [facility] from [facility] for liver cirrhosis. Pt has an extensive hx of etoh abuse, last drink 3 weeks prior. [place] [facility] [place] for treatment of alcoholic liver disease. States that over the past two days he has noticed bloating in the abdomen, denying any CP, SOB, Abd pain, paresthesias, fevers, fatigue. Transferred for further GI workup. Seen at bedside, alert and cooperative. Endorses compliance w/ medications and alcohol cessation. ROS Neg other than mentioned.  FHx: Father - DM; Mother non contributory  PSHx: None  SHx: Never smoker; last ETOH 3 weeks ago; No recreational drugs     (20 Aug 2022 16:52)    The patient was seen and evaluated   The patient is in no acute distress.      I&O's Summary    09 Sep 2022 07:01  -  10 Sep 2022 07:00  --------------------------------------------------------  IN: 0 mL / OUT: 150 mL / NET: -150 mL      Allergies    No Known Allergies    Intolerances      HEALTH ISSUES - PROBLEM Dx:  Alcoholic cirrhosis of liver    PAST MEDICAL & SURGICAL HISTORY:  Cirrhosis      Vital Signs Last 24 Hrs  T(C): 36.5 (10 Sep 2022 05:25), Max: 36.6 (10 Sep 2022 04:40)  T(F): 97.7 (10 Sep 2022 05:25), Max: 97.8 (10 Sep 2022 04:40)  HR: 69 (10 Sep 2022 05:25) (60 - 69)  BP: 104/57 (10 Sep 2022 05:25) (100/53 - 113/69)  BP(mean): --  RR: 17 (10 Sep 2022 05:25) (17 - 18)  SpO2: 98% (10 Sep 2022 05:25) (98% - 100%)    Parameters below as of 10 Sep 2022 05:25  Patient On (Oxygen Delivery Method): room air    T(C): 36.5 (09-10-22 @ 05:25), Max: 36.6 (09-10-22 @ 04:40)  HR: 69 (09-10-22 @ 05:25) (60 - 69)  BP: 104/57 (09-10-22 @ 05:25) (100/53 - 113/69)  RR: 17 (09-10-22 @ 05:25) (17 - 18)  SpO2: 98% (09-10-22 @ 05:25) (98% - 100%)  Wt(kg): --    PHYSICAL EXAM:    GENERAL: NAD,  HEAD:  Atraumatic, Normocephalic  EYES: EOMI, PERRL, conjunctiva and sclera clear  ENMT:  Moist mucous membranes,  No lesions  NECK: Supple, No JVD, Normal thyroid  NERVOUS SYSTEM:  Alert & Oriented X3,  Moves upper and lower extremities; CNS-II-XII  CHEST/LUNG: Clear to auscultation bilaterally; No rales, rhonchi, wheezing,   HEART: Regular rate and rhythm; No murmurs,   ABDOMEN: Soft, Nontender, Nondistended; Bowel sounds present  EXTREMITIES:  Peripheral Pulses, No  cyanosis, or edema  SKIN: No rashes or lesions  psychiatry- mood and affect approprite, Insight and judgement intact     albumin human 25% IVPB 100 milliLiter(s) IV Intermittent every 12 hours  aluminum hydroxide/magnesium hydroxide/simethicone Suspension 30 milliLiter(s) Oral every 4 hours PRN  Biotene Dry Mouth Oral Rinse 5 milliLiter(s) Swish and Spit every 4 hours  ciprofloxacin     Tablet 500 milliGRAM(s) Oral daily  dextrose 5% 1000 milliLiter(s) IV Continuous <Continuous>  folic acid 1 milliGRAM(s) Oral daily  lactulose Syrup 10 Gram(s) Oral four times a day  lidocaine   4% Patch 1 Patch Transdermal every 24 hours  melatonin 3 milliGRAM(s) Oral at bedtime PRN  midodrine. 7.5 milliGRAM(s) Oral three times a day  octreotide  Infusion 50 MICROgram(s)/Hr IV Continuous <Continuous>  ondansetron Injectable 4 milliGRAM(s) IV Push every 8 hours PRN  pantoprazole    Tablet 40 milliGRAM(s) Oral before breakfast  rifAXIMin 550 milliGRAM(s) Oral two times a day  simethicone 80 milliGRAM(s) Chew daily  thiamine 100 milliGRAM(s) Oral daily      LABS:                          9.1    16.80 )-----------( 39       ( 10 Sep 2022 02:24 )             24.5     09-10    128<L>  |  88<L>  |  89.5<H>  ----------------------------<  120<H>  3.6   |  22.0  |  3.42<H>    Ca    7.3<L>      10 Sep 2022 02:24    TPro  4.6<L>  /  Alb  2.6<L>  /  TBili  41.2<H>  /  DBili  x   /  AST  84<H>  /  ALT  54<H>  /  AlkPhos  264<H>  09-10    LIVER FUNCTIONS - ( 10 Sep 2022 02:24 )  Alb: 2.6 g/dL / Pro: 4.6 g/dL / ALK PHOS: 264 U/L / ALT: 54 U/L / AST: 84 U/L / GGT: x           PT/INR - ( 10 Sep 2022 02:24 )   PT: 34.0 sec;   INR: 2.90 ratio      Urinalysis Basic - ( 08 Sep 2022 16:00 )    Color: Yellow / Appearance: Clear / S.010 / pH: x  Gluc: x / Ketone: Negative  / Bili: Large / Urobili: 4   Blood: x / Protein: Trace mg/dL / Nitrite: Positive   Leuk Esterase: Trace / RBC: 3-5 /HPF / WBC 3-5 /HPF   Sq Epi: x / Non Sq Epi: Few / Bacteria: Few      CAPILLARY BLOOD GLUCOSE          RADIOLOGY & ADDITIONAL TESTS:      Consultant notes reviewed    Case discussed with consultant/provider/ family /patient

## 2022-09-11 NOTE — PROGRESS NOTE ADULT - SUBJECTIVE AND OBJECTIVE BOX
TODD DICKINSON Patient is a 47y old  Male who presents with a chief complaint of Cirrhosis 2/2 ETOH (10 Sep 2022 09:09)     HPI:  48 y/o M w/ a hx of ?Deep Vein Thrombosis (not on AC as per pt), Hypertension, Alcohol use disorder, liver failure transferred to [facility] from [facility] for liver cirrhosis. Pt has an extensive hx of etoh abuse, last drink 3 weeks prior. Recently discharged from [facility] on 8/15 for treatment of alcoholic liver disease. States that over the past two days he has noticed bloating in the abdomen, denying any CP, SOB, Abd pain, paresthesias, fevers, fatigue. Transferred for further GI workup. Seen at bedside, alert and cooperative. Endorses compliance w/ medications and alcohol cessation. ROS Neg other than mentioned.  FHx: Father - DM; Mother non contributory  PSHx: None  SHx: Never smoker; last ETOH 3 weeks ago; No recreational drugs     (20 Aug 2022 16:52)    The patient was seen and evaluated jaundice- cirrhosis   The patient is in no acute distress.  Denied any fever chest pain, palpitations, shortness of breath,  fever, dysuria, cough, edema       I&O's Summary    Allergies    No Known Allergies    Intolerances      HEALTH ISSUES - PROBLEM Dx:  Alcoholic cirrhosis of liver          PAST MEDICAL & SURGICAL HISTORY:  Cirrhosis              Vital Signs Last 24 Hrs  T(C): 36.5 (11 Sep 2022 04:47), Max: 36.7 (11 Sep 2022 00:31)  T(F): 97.7 (11 Sep 2022 04:47), Max: 98.1 (11 Sep 2022 00:31)  HR: 66 (11 Sep 2022 04:47) (64 - 69)  BP: 101/61 (11 Sep 2022 04:47) (95/59 - 109/63)  BP(mean): --  RR: 18 (11 Sep 2022 04:47) (18 - 18)  SpO2: 99% (11 Sep 2022 04:47) (99% - 100%)    Parameters below as of 11 Sep 2022 04:47  Patient On (Oxygen Delivery Method): room air    T(C): 36.5 (09-11-22 @ 04:47), Max: 36.7 (09-11-22 @ 00:31)  HR: 66 (09-11-22 @ 04:47) (64 - 69)  BP: 101/61 (09-11-22 @ 04:47) (95/59 - 109/63)  RR: 18 (09-11-22 @ 04:47) (18 - 18)  SpO2: 99% (09-11-22 @ 04:47) (99% - 100%)  Wt(kg): --    PHYSICAL EXAM:    GENERAL: NAD, jaundiced sclera and skin  HEAD:  Atraumatic, Normocephalic  EYES: EOMI, PERRL, conjunctiva and sclera clear  ENMT:  Moist mucous membranes,  No lesions  NECK: Supple, No JVD, Normal thyroid  NERVOUS SYSTEM:  Alert & Oriented X3, in bed  Moves upper and lower extremities; CNS-II-XII  CHEST/LUNG: Clear to auscultation bilaterally; No rales, rhonchi, wheezing,   HEART: Regular rate and rhythm; No murmurs,   ABDOMEN: Soft, Nontender, Nondistended; Bowel sounds present  EXTREMITIES:  Peripheral Pulses, No  cyanosis, or edema  SKIN: jaundiced   psychiatry- mood and affect approprite, Insight and judgement intact     albumin human 25% IVPB 100 milliLiter(s) IV Intermittent every 12 hours  aluminum hydroxide/magnesium hydroxide/simethicone Suspension 30 milliLiter(s) Oral every 4 hours PRN  Biotene Dry Mouth Oral Rinse 5 milliLiter(s) Swish and Spit every 4 hours  ciprofloxacin     Tablet 500 milliGRAM(s) Oral daily  dextrose 5% 1000 milliLiter(s) IV Continuous <Continuous>  folic acid 1 milliGRAM(s) Oral daily  lactulose Syrup 10 Gram(s) Oral four times a day  lidocaine   4% Patch 1 Patch Transdermal every 24 hours  melatonin 3 milliGRAM(s) Oral at bedtime PRN  midodrine. 7.5 milliGRAM(s) Oral three times a day  octreotide  Infusion 50 MICROgram(s)/Hr IV Continuous <Continuous>  ondansetron Injectable 4 milliGRAM(s) IV Push every 8 hours PRN  pantoprazole    Tablet 40 milliGRAM(s) Oral before breakfast  rifAXIMin 550 milliGRAM(s) Oral two times a day  simethicone 80 milliGRAM(s) Chew daily  thiamine 100 milliGRAM(s) Oral daily      LABS:                          9.1    16.80 )-----------( 39       ( 10 Sep 2022 02:24 )             24.5     09-10    128<L>  |  88<L>  |  89.5<H>  ----------------------------<  120<H>  3.6   |  22.0  |  3.42<H>    Ca    7.3<L>      10 Sep 2022 02:24    TPro  4.6<L>  /  Alb  2.6<L>  /  TBili  41.2<H>  /  DBili  x   /  AST  84<H>  /  ALT  54<H>  /  AlkPhos  264<H>  09-10    LIVER FUNCTIONS - ( 10 Sep 2022 02:24 )  Alb: 2.6 g/dL / Pro: 4.6 g/dL / ALK PHOS: 264 U/L / ALT: 54 U/L / AST: 84 U/L / GGT: x           PT/INR - ( 10 Sep 2022 02:24 )   PT: 34.0 sec;   INR: 2.90 ratio                 CAPILLARY BLOOD GLUCOSE          RADIOLOGY & ADDITIONAL TESTS:      Consultant notes reviewed    Case discussed with consultant/provider/ family /patient

## 2022-09-11 NOTE — PROGRESS NOTE ADULT - ASSESSMENT
The patient is a 47 year old male with PMH of HTN, ? DVT (not on AC as per pt) and significant ETOH abuse resulting in cirrhosis complicated by ascites initially presented to [facility] for worsening abdominal distension; transferred to [facility] for decompensated alcoholic cirrhosis. While here, he underwent large volume paracentesis on 08/22/2022 with 3.8L fluid removal. Fluid studies negative for SBP. Had second paracentesis on 09/01/2022 with 2.9L fluid removal. Also received 7 day course of steroids. Hospital course notable for epistaxis, coagulopathy, hyponatremia and LASHONDA.    Acute Kidney Injury  -Suspect hepatorenal syndrome vs ATN from pigment nephropathy (hyperbilirubinemia)  -Baseline creatinine is < 0.2mg/dL; scr now continues to worsen  -Renal ultrasound - R kidney 12.9cm + L kidney 14.8cm - no obstruction   -Diuretics on hold  - Continue  IV albumin, midodrine + octreotide  - Pt is not a liver transplant candidate; RRT would not improve prognosis   -Pt is hospice appropriate but has wished to remain fill code   -Ongoing GOC discussions, little else to add from nephrology perpesctive    d/w Dr. Schaefer

## 2022-09-11 NOTE — PROGRESS NOTE ADULT - ASSESSMENT
47y/oM PMH EtOH abuse, cirrhosis admitted for decompensated alcoholic cirrhosis, coagulopathy, had episode of epistaxis , now resolved.  started on spironolactone , Lasix , Cipro for SBP ppx , rifaximin ,  s/p 2900 cc fluid removal by IR 9/1.   noted to have hypotension Lasix and spironolactone held .   also low grade temp t-max of 100.4 on 9/8    Decompensated alcoholic cirrhosis w/ascites with ETOH /Hepato- renal syndrome/coagulopathy/thrombocytopenia  Blood culture 8/26 neg x 2  Ascitic fluid -cxs 8/22 neg x 1   MRCP done showed cirrhosis with portal HTN and large ascites  s/p IR guided repeat  paracenteses 9/1   cont lactulose, rifaximin   9/1- Lasix , spironolactone due to hypotension,   cont cipro 500mg qd indefinitely for ppx for SBP due to low total BF protein   s/p ceftriaxone on admission  coagulopathy- c/w Vit K po x3 days (3/3)   follow up op with hepatology after discharge     Worsening Renal failure- metabolic acidosis   not a candidate for HD due to ineligibility for liver transplant  Bicarb drip  Hypokalemia - replete w/ IV potassium chloride  Dr Young discussed case w/ Dr. Boggs over the phone at length   rejected from [place] for transport  IR - Paracentesis done  Palliative consult- GOC discussion  Possible Hospice    Hypotension   2/2 intravascular volume depletion  c/w Midodrine Q8  discussion with nephro, will cont hold diuretic    Coagulopathy with liver cirrhosis-Epistaxis  Discontinued heparin sq for dvt ppx , scds only   s/p  cryoprecipitate , s/p po /iv  vit k , ffp.   no further bleeding , monitor for new episodes   heme consulted , recommendations -  ONLY if bleeding and when platelets <50K - transfuse 1-2 units platelets.    Also can give cryoprecipitate over FFP due to lesser volume.    Transfuse platelets prior to planned procedures if plts <50K.    Macrocytic anemia   likely due to ETOH / liver disease     Hyponatremia with worsening LASHONDA -s/p albumin x2   PO fluid restriction 1L daily    Continue to hold diuretics at this time   c/w Octreotide and increase Midodrine  f/u Nephro recs  reached out Dr Diaz-renal consult appreciated     Severe protein-calorie malnutrition secondary to liver cirrhosis    Venodyne boots bilateral     Dispo: [facility] for dc planning

## 2022-09-11 NOTE — PROGRESS NOTE ADULT - SUBJECTIVE AND OBJECTIVE BOX
[facility] DIVISION OF KIDNEY DISEASES AND HYPERTENSION -- FOLLOW UP NOTE  --------------------------------------------------------------------------------  Chief Complaint: Boo    24 hour events/subjective:  pt with worsening cofusion        PAST HISTORY  --------------------------------------------------------------------------------  No significant changes to PMH, PSH, FHx, SHx, unless otherwise noted    ALLERGIES & MEDICATIONS  --------------------------------------------------------------------------------  Allergies    No Known Allergies      Standing Inpatient Medications  albumin human 25% IVPB 100 milliLiter(s) IV Intermittent every 12 hours  Biotene Dry Mouth Oral Rinse 5 milliLiter(s) Swish and Spit every 4 hours  ciprofloxacin     Tablet 500 milliGRAM(s) Oral daily  dextrose 5% 1000 milliLiter(s) IV Continuous <Continuous>  folic acid 1 milliGRAM(s) Oral daily  lactulose Syrup 10 Gram(s) Oral four times a day  lidocaine   4% Patch 1 Patch Transdermal every 24 hours  midodrine. 7.5 milliGRAM(s) Oral three times a day  octreotide  Infusion 50 MICROgram(s)/Hr IV Continuous <Continuous>  pantoprazole    Tablet 40 milliGRAM(s) Oral before breakfast  rifAXIMin 550 milliGRAM(s) Oral two times a day  simethicone 80 milliGRAM(s) Chew daily  thiamine 100 milliGRAM(s) Oral daily    PRN Inpatient Medications  aluminum hydroxide/magnesium hydroxide/simethicone Suspension 30 milliLiter(s) Oral every 4 hours PRN  melatonin 3 milliGRAM(s) Oral at bedtime PRN  ondansetron Injectable 4 milliGRAM(s) IV Push every 8 hours PRN      REVIEW OF SYSTEMS  --------------------------------------------------------------------------------  limited    VITALS/PHYSICAL EXAM  --------------------------------------------------------------------------------  T(C): 36.6 (09-11-22 @ 16:47), Max: 36.7 (09-11-22 @ 00:31)  HR: 69 (09-11-22 @ 16:47) (66 - 91)  BP: 112/68 (09-11-22 @ 16:47) (95/59 - 112/68)  RR: 18 (09-11-22 @ 16:47) (18 - 18)  SpO2: 96% (09-11-22 @ 16:47) (96% - 100%)  Wt(kg): --        Physical Exam:  Constitutional: confused  HEENT: sclera icterus   Neck: Soft and supple,    Respiratory: Breath sounds are clear bilaterally,   Cardiovascular: S1 and S2,   Gastrointestinal: Bowel Sounds present,  Extremities: No peripheral edema  Skin: deeply jaundiced      LABS/STUDIES  --------------------------------------------------------------------------------              9.1    16.80 >-----------<  39       [09-10-22 @ 02:24]              24.5     128  |  88  |  89.5  ----------------------------<  120      [09-10-22 @ 02:24]  3.6   |  22.0  |  3.42        Ca     7.3     [09-10-22 @ 02:24]    TPro  4.6  /  Alb  2.6  /  TBili  41.2  /  DBili  x   /  AST  84  /  ALT  54  /  AlkPhos  264  [09-10-22 @ 02:24]    PT/INR: PT 34.0 , INR 2.90       [09-10-22 @ 02:24]      Creatinine Trend:  SCr 3.42 [09-10 @ 02:24]  SCr 2.95 [09-09 @ 07:10]  SCr 2.70 [09-08 @ 06:10]  SCr 1.98 [09-07 @ 06:34]  SCr 1.67 [09-06 @ 04:14]    Urinalysis - [09-08-22 @ 16:00]      Color Yellow / Appearance Clear / SG 1.010 / pH 6.0      Gluc Negative / Ketone Negative  / Bili Large / Urobili 4       Blood Moderate / Protein Trace / Leuk Est Trace / Nitrite Positive      RBC 3-5 / WBC 3-5 / Hyaline  / Gran  / Sq Epi  / Non Sq Epi Few / Bacteria Few      Iron 59, TIBC 129, %sat 46      [08-22-22 @ 04:11]  Ferritin 4185      [08-22-22 @ 04:11]  TSH 2.93      [09-02-22 @ 05:35]  Lipid: chol 75, , HDL 12, LDL --      [09-02-22 @ 17:44]    HBsAg Nonreact      [08-21-22 @ 04:34]  HCV 0.17, Nonreact      [08-21-22 @ 04:34]    Free Light Chains: kappa 10.51, lambda 10.92, ratio = 0.96      [09-02 @ 17:44]  Immunofixation Serum:   Weak IgG Lambda Band Identified    Reference Range: None Detected      [09-02-22 @ 17:44]  SPEP Interpretation: Weak Gamma Migrating Paraprotein Identified      [09-02-22 @ 17:44]

## 2022-09-12 NOTE — PROGRESS NOTE ADULT - ASSESSMENT
47y/oM PMH EtOH abuse, cirrhosis admitted for decompensated alcoholic cirrhosis, coagulopathy, had episode of epistaxis-now resolved.    started on spironolactone , Lasix , Cipro for SBP ppx , rifaximin ,  s/p 2900 cc fluid removal by IR 9/1.   noted to have hypotension Lasix and spironolactone held .   also low grade temp t-max of 100.4 on 9/8    Decompensated alcoholic cirrhosis w/ascites with ETOH /Hepato- renal syndrome/coagulopathy/thrombocytopenia  called IR for paracentesis   Blood culture 8/26 neg x 2  Ascitic fluid -cxs 8/22 neg x 1   MRCP done showed cirrhosis with portal HTN and large ascites  s/p IR guided repeat  paracenteses 9/1   cont lactulose, rifaximin   9/1- Lasix , spironolactone due to hypotension,   cont cipro 500mg qd indefinitely for ppx for SBP due to low total BF protein   s/p ceftriaxone on admission  coagulopathy- c/w Vit K po x3 days (3/3)   follow up op with hepatology after discharge     Worsening Renal failure- metabolic acidosis   not a candidate for HD due to ineligibility for liver transplant  Bicarb drip  Hypokalemia - replete w/ IV potassium chloride  Dr Young discussed case w/ Dr. Boggs over the phone at length -rejected from [place] transfer  IR - Paracentesis done  had GOC discussion- he's unable to repeat information given with  repeat instructions    Hypotension   2/2 intravascular volume depletion  c/w Midodrine Q8  discussion with nephro, will cont hold diuretic    Coagulopathy with liver cirrhosis-Epistaxis  Discontinued heparin sq for dvt ppx , scds only   s/p  cryoprecipitate , s/p po /iv  vit k , ffp.   no further bleeding , monitor for new episodes   heme consulted , recommendations -  ONLY if bleeding and when platelets <50K - transfuse 1-2 units platelets.    Also can give cryoprecipitate over FFP due to lesser volume.    Transfuse platelets prior to planned procedures if plts <50K.    Macrocytic anemia   likely due to ETOH / liver disease     Hyponatremia with worsening LASHONDA -s/p albumin x2   PO fluid restriction 1L daily    Continue to hold diuretics at this time   c/w Octreotide and increase Midodrine  f/u Nephro recs  reached out Dr Diaz-renal consult appreciated- recommend hospice disussion     Severe protein-calorie malnutrition secondary to liver cirrhosis    Adi ramos bilateral     Dispo: [facility] for dc planning     I called through [facility]  [phone]- number for family in [facility]- NO answer

## 2022-09-12 NOTE — PROGRESS NOTE ADULT - SUBJECTIVE AND OBJECTIVE BOX
Reason for visit: LASHONDA    Subjective: Patient confused. Icteric.     ROS: Unable to obtain 2/2 patient current clinical condition.    Physical Exam:  Gen: no acute distress  MS: confused  Eyes: ++ icterus  HENT: NCAT, MMM  CV: rhythm reg reg, rate normal, no m/g/r  Chest: CTAB, no w/r/r,  Abd: distended  Extremities: No edema    =======================================================  Vital Signs Last 24 Hrs  T(C): 36.4 (12 Sep 2022 11:08), Max: 36.6 (11 Sep 2022 16:47)  T(F): 97.6 (12 Sep 2022 11:08), Max: 97.9 (11 Sep 2022 21:43)  HR: 64 (12 Sep 2022 11:08) (64 - 73)  BP: 110/72 (12 Sep 2022 11:08) (110/72 - 123/70)  RR: 18 (12 Sep 2022 11:08) (18 - 18)  SpO2: 100% (12 Sep 2022 11:08) (96% - 100%)    Parameters below as of 12 Sep 2022 11:08  Patient On (Oxygen Delivery Method): room air      I&O's Summary    11 Sep 2022 07:01  -  12 Sep 2022 07:00  --------------------------------------------------------  IN: 720 mL / OUT: 200 mL / NET: 520 mL      =======================================================  Current Antibiotics:  ciprofloxacin     Tablet 500 milliGRAM(s) Oral daily  rifAXIMin 550 milliGRAM(s) Oral two times a day    Other medications:  albumin human 25% IVPB 100 milliLiter(s) IV Intermittent every 12 hours  Biotene Dry Mouth Oral Rinse 5 milliLiter(s) Swish and Spit every 4 hours  dextrose 5% 1000 milliLiter(s) IV Continuous <Continuous>  folic acid 1 milliGRAM(s) Oral daily  lactulose Syrup 10 Gram(s) Oral four times a day  lidocaine   4% Patch 1 Patch Transdermal every 24 hours  midodrine. 7.5 milliGRAM(s) Oral three times a day  octreotide  Infusion 50 MICROgram(s)/Hr IV Continuous <Continuous>  pantoprazole    Tablet 40 milliGRAM(s) Oral before breakfast  simethicone 80 milliGRAM(s) Chew daily  thiamine 100 milliGRAM(s) Oral daily    =======================================================        Creatinine, Serum: 3.42 mg/dL (09-10-22 @ 02:24)  Creatinine, Serum: 2.95 mg/dL (09-09-22 @ 07:10)  Creatinine, Serum: 2.70 mg/dL (09-08-22 @ 06:10)      ======================================================= Reason for visit: LASHONDA    Subjective: Patient confused. Icteric. Opening eyes to vocal stimuli and tracking.     ROS: Unable to obtain 2/2 patient current clinical condition.    Physical Exam:  Gen: no acute distress  MS: confused  Eyes: ++ icterus  HENT: NCAT, MMM  CV: rhythm reg reg, rate normal, no m/g/r  Chest: CTAB, no w/r/r,  Abd: distended  Extremities: No edema    =======================================================  Vital Signs Last 24 Hrs  T(C): 36.4 (12 Sep 2022 11:08), Max: 36.6 (11 Sep 2022 16:47)  T(F): 97.6 (12 Sep 2022 11:08), Max: 97.9 (11 Sep 2022 21:43)  HR: 64 (12 Sep 2022 11:08) (64 - 73)  BP: 110/72 (12 Sep 2022 11:08) (110/72 - 123/70)  RR: 18 (12 Sep 2022 11:08) (18 - 18)  SpO2: 100% (12 Sep 2022 11:08) (96% - 100%)    Parameters below as of 12 Sep 2022 11:08  Patient On (Oxygen Delivery Method): room air      I&O's Summary    11 Sep 2022 07:01  -  12 Sep 2022 07:00  --------------------------------------------------------  IN: 720 mL / OUT: 200 mL / NET: 520 mL      =======================================================  Current Antibiotics:  ciprofloxacin     Tablet 500 milliGRAM(s) Oral daily  rifAXIMin 550 milliGRAM(s) Oral two times a day    Other medications:  albumin human 25% IVPB 100 milliLiter(s) IV Intermittent every 12 hours  Biotene Dry Mouth Oral Rinse 5 milliLiter(s) Swish and Spit every 4 hours  dextrose 5% 1000 milliLiter(s) IV Continuous <Continuous>  folic acid 1 milliGRAM(s) Oral daily  lactulose Syrup 10 Gram(s) Oral four times a day  lidocaine   4% Patch 1 Patch Transdermal every 24 hours  midodrine. 7.5 milliGRAM(s) Oral three times a day  octreotide  Infusion 50 MICROgram(s)/Hr IV Continuous <Continuous>  pantoprazole    Tablet 40 milliGRAM(s) Oral before breakfast  simethicone 80 milliGRAM(s) Chew daily  thiamine 100 milliGRAM(s) Oral daily    =======================================================        Creatinine, Serum: 3.42 mg/dL (09-10-22 @ 02:24)  Creatinine, Serum: 2.95 mg/dL (09-09-22 @ 07:10)  Creatinine, Serum: 2.70 mg/dL (09-08-22 @ 06:10)      =======================================================

## 2022-09-12 NOTE — PROGRESS NOTE ADULT - SUBJECTIVE AND OBJECTIVE BOX
TODD DICKINSON Patient is a 47y old  Male who presents with a chief complaint of Cirrhosis 2/2 ETOH (12 Sep 2022 13:00)     HPI:  48 y/o M w/ a hx of ?Deep Vein Thrombosis (not on AC as per pt), Hypertension, Alcohol use disorder, liver failure transferred to [facility] from [facility] for liver cirrhosis. Pt has an extensive hx of etoh abuse, last drink 3 weeks prior. [place] [facility] [place] treatment of alcoholic liver disease. States that over the past two days he has noticed bloating in the abdomen, denying any CP, SOB, Abd pain, paresthesias, fevers, fatigue. Transferred for further GI workup. Seen at bedside, alert and cooperative. Endorses compliance w/ medications and alcohol cessation. ROS Neg other than mentioned.  FHx: Father - DM; Mother non contributory  PSHx: None  SHx: Never smoker; last ETOH 3 weeks ago; No recreational drugs     (20 Aug 2022 16:52)    The patient was seen and evaluated   The patient is in no acute distress.  Denied any fever chest pain, palpitations, shortness of breath, abdominal pain, fever, dysuria, cough, edema   spoke to patient with  at bedside- he is unable to verbalize understanding of his illness -   unable to repeat any information given to him-   states doesn't have any family that he wants us to contact- [facility] doesn't have a wife or kids-  when asked about brothers and sisters declines   after discussing the whole diagnosis and prognosis with translatory- he's unable to repeat the information given to him   when asked about GOC and advanced directives- states lord kerns has already written what will happen- states he wants to live and refuses to say anything about DNR DNi    I&O's Summary    11 Sep 2022 07:01  -  12 Sep 2022 07:00  --------------------------------------------------------  IN: 720 mL / OUT: 200 mL / NET: 520 mL      Allergies    No Known Allergies    Intolerances      HEALTH ISSUES - PROBLEM Dx:  Alcoholic cirrhosis of liver          PAST MEDICAL & SURGICAL HISTORY:  Cirrhosis              Vital Signs Last 24 Hrs  T(C): 36.6 (12 Sep 2022 15:48), Max: 36.6 (11 Sep 2022 21:43)  T(F): 97.8 (12 Sep 2022 15:48), Max: 97.9 (11 Sep 2022 21:43)  HR: 65 (12 Sep 2022 15:48) (64 - 73)  BP: 109/67 (12 Sep 2022 15:48) (109/67 - 123/70)  BP(mean): --  RR: 19 (12 Sep 2022 15:48) (18 - 19)  SpO2: 99% (12 Sep 2022 15:48) (99% - 100%)    Parameters below as of 12 Sep 2022 15:48  Patient On (Oxygen Delivery Method): room air    T(C): 36.6 (09-12-22 @ 15:48), Max: 36.6 (09-11-22 @ 21:43)  HR: 65 (09-12-22 @ 15:48) (64 - 73)  BP: 109/67 (09-12-22 @ 15:48) (109/67 - 123/70)  RR: 19 (09-12-22 @ 15:48) (18 - 19)  SpO2: 99% (09-12-22 @ 15:48) (99% - 100%)  Wt(kg): --    PHYSICAL EXAM:    GENERAL: NAD, jaundiced ill appearing   HEAD:  Atraumatic, Normocephalic  EYES: EOMI, PERRL, conjunctiva and sclera yellow  ENMT:  Moist mucous membranes,  No lesions  NECK: Supple, No JVD, Normal thyroid  NERVOUS SYSTEM:  Alert & confused ,  Moves upper and lower extremities; CNS-II-XII  CHEST/LUNG: Clear to auscultation bilaterally; No rales, rhonchi, wheezing,   HEART: Regular rate and rhythm; No murmurs,   ABDOMEN: Soft, Nontender, Nondistended; Bowel sounds present  EXTREMITIES:  Peripheral Pulses, No  cyanosis, or edema  SKIN: yellow dry   psychiatry-unclear Insight and judgement intact     albumin human 25% IVPB 100 milliLiter(s) IV Intermittent every 12 hours  aluminum hydroxide/magnesium hydroxide/simethicone Suspension 30 milliLiter(s) Oral every 4 hours PRN  Biotene Dry Mouth Oral Rinse 5 milliLiter(s) Swish and Spit every 4 hours  ciprofloxacin     Tablet 500 milliGRAM(s) Oral daily  dextrose 5% 1000 milliLiter(s) IV Continuous <Continuous>  folic acid 1 milliGRAM(s) Oral daily  lactulose Syrup 10 Gram(s) Oral four times a day  lidocaine   4% Patch 1 Patch Transdermal every 24 hours  melatonin 3 milliGRAM(s) Oral at bedtime PRN  midodrine. 7.5 milliGRAM(s) Oral three times a day  octreotide  Infusion 50 MICROgram(s)/Hr IV Continuous <Continuous>  ondansetron Injectable 4 milliGRAM(s) IV Push every 8 hours PRN  pantoprazole    Tablet 40 milliGRAM(s) Oral before breakfast  rifAXIMin 550 milliGRAM(s) Oral two times a day  simethicone 80 milliGRAM(s) Chew daily  thiamine 100 milliGRAM(s) Oral daily      LABS:      CAPILLARY BLOOD GLUCOSE  RADIOLOGY & ADDITIONAL TESTS:    Consultant notes reviewed    Case discussed with consultant/provider/ family /patient

## 2022-09-12 NOTE — PROGRESS NOTE ADULT - ASSESSMENT
The patient is a 47 year old male with PMH of HTN, ? DVT (not on AC as per pt) and significant ETOH abuse resulting in cirrhosis complicated by ascites initially presented to [facility] for worsening abdominal distension; transferred to [facility] for decompensated alcoholic cirrhosis. While here, he underwent large volume paracentesis on 08/22/2022 with 3.8L fluid removal. Fluid studies negative for SBP. Had second paracentesis on 09/01/2022 with 2.9L fluid removal. Also received 7 day course of steroids. Hospital course notable for epistaxis, coagulopathy, hyponatremia and LASHONDA.    Acute Kidney Injury  -Suspect hepatorenal syndrome vs ATN from pigment nephropathy (hyperbilirubinemia)  -Baseline creatinine is < 0.2mg/dL; scr now continues to worsen  -Renal ultrasound - R kidney 12.9cm + L kidney 14.8cm - no obstruction   -Diuretics on hold  -Continue  IV albumin, midodrine + octreotide  -Pt is not a liver transplant candidate; RRT would not improve prognosis   -Pt is hospice appropriate but has wished to remain fill code  -Ongoing GOC discussions, little else to add from nephrology perpesctive     The patient is a 47 year old male with PMH of HTN, ? DVT (not on AC as per pt) and significant ETOH abuse resulting in cirrhosis complicated by ascites initially presented to [facility] for worsening abdominal distension; transferred to [facility] for decompensated alcoholic cirrhosis. While here, he underwent large volume paracentesis on 08/22/2022 with 3.8L fluid removal. Fluid studies negative for SBP. Had second paracentesis on 09/01/2022 with 2.9L fluid removal. Also received 7 day course of steroids. Hospital course notable for epistaxis, coagulopathy, hyponatremia and LASHONDA.    Acute Kidney Injury  -Suspect hepatorenal syndrome vs ATN from pigment nephropathy (hyperbilirubinemia)  -Baseline creatinine is < 0.2mg/dL; scr now continues to worsen now up to 3.42 mg/dl based on labs obtained on 9/10/22  -Renal ultrasound - R kidney 12.9cm + L kidney 14.8cm - no obstruction   -Continue  IV albumin, midodrine + octreotide  -Pt is not a liver transplant candidate; RRT would not improve prognosis   -Pt is hospice appropriate but has wished to remain full code  -Ongoing GOC discussions    In my opinion, ethics team should be consulted for the patient to help decide further course of action.

## 2022-09-13 NOTE — PROGRESS NOTE ADULT - SUBJECTIVE AND OBJECTIVE BOX
TODD DICKINSON Patient is a 47y old  Male who presents with a chief complaint of Cirrhosis 2/2 ETOH (12 Sep 2022 17:11)     HPI:  46 y/o M w/ a hx of ?Deep Vein Thrombosis (not on AC as per pt), Hypertension, Alcohol use disorder, liver failure transferred to [facility] from [facility] for liver cirrhosis. Pt has an extensive hx of etoh abuse, last drink 3 weeks prior. [place] [facility] [place] of alcoholic liver disease. States that over the past two days he has noticed bloating in the abdomen, denying any CP, SOB, Abd pain, paresthesias, fevers, fatigue. Transferred for further GI workup. Seen at bedside, alert and cooperative. Endorses compliance w/ medications and alcohol cessation. ROS Neg other than mentioned.  FHx: Father - DM; Mother non contributory  PSHx: None  SHx: Never smoker; last ETOH 3 weeks ago; No recreational drugs     (20 Aug 2022 16:52)    The patient was seen and evaluated - feels abdomen is distended - discussed paracentesis   The patient is in no acute distress.  Denied any fever chest pain, palpitations, shortness of breath, abdominal pain, fever, dysuria, cough, edema       I&O's Summary    Allergies    No Known Allergies    Intolerances      HEALTH ISSUES - PROBLEM Dx:  Alcoholic cirrhosis of liver          PAST MEDICAL & SURGICAL HISTORY:  Cirrhosis              Vital Signs Last 24 Hrs  T(C): 36.8 (13 Sep 2022 05:07), Max: 36.8 (13 Sep 2022 05:07)  T(F): 98.2 (13 Sep 2022 05:07), Max: 98.2 (13 Sep 2022 05:07)  HR: 68 (13 Sep 2022 05:07) (64 - 68)  BP: 109/64 (13 Sep 2022 05:07) (109/64 - 118/62)  BP(mean): --  RR: 18 (13 Sep 2022 05:07) (18 - 19)  SpO2: 98% (13 Sep 2022 05:07) (98% - 100%)    Parameters below as of 13 Sep 2022 05:07  Patient On (Oxygen Delivery Method): room air    T(C): 36.8 (09-13-22 @ 05:07), Max: 36.8 (09-13-22 @ 05:07)  HR: 68 (09-13-22 @ 05:07) (64 - 68)  BP: 109/64 (09-13-22 @ 05:07) (109/64 - 118/62)  RR: 18 (09-13-22 @ 05:07) (18 - 19)  SpO2: 98% (09-13-22 @ 05:07) (98% - 100%)  Wt(kg): --    PHYSICAL EXAM:    GENERAL: NAD, ill appearing chronically- yellow icteric   HEAD:  Atraumatic, Normocephalic  EYES: EOMI, PERRL, conjunctiva and sclera clear  ENMT:  Moist mucous membranes,  No lesions  NECK: Supple, No JVD, Normal thyroid  NERVOUS SYSTEM:  Alert & Oriented X3,  Moves upper and lower extremities; CNS-II-XII  CHEST/LUNG: Clear to auscultation bilaterally; No rales, rhonchi, wheezing,   HEART: Regular rate and rhythm; No murmurs,   ABDOMEN: Soft, Nontender,distended; Bowel sounds present  EXTREMITIES:  Peripheral Pulses, No  cyanosis, or edema  SKIN: No rashes or lesions  psychiatry- unclear Insight and judgement intact     aluminum hydroxide/magnesium hydroxide/simethicone Suspension 30 milliLiter(s) Oral every 4 hours PRN  Biotene Dry Mouth Oral Rinse 5 milliLiter(s) Swish and Spit every 4 hours  ciprofloxacin     Tablet 500 milliGRAM(s) Oral daily  dextrose 5% 1000 milliLiter(s) IV Continuous <Continuous>  folic acid 1 milliGRAM(s) Oral daily  lactulose Syrup 10 Gram(s) Oral four times a day  lidocaine   4% Patch 1 Patch Transdermal every 24 hours  melatonin 3 milliGRAM(s) Oral at bedtime PRN  midodrine. 7.5 milliGRAM(s) Oral three times a day  octreotide  Infusion 50 MICROgram(s)/Hr IV Continuous <Continuous>  ondansetron Injectable 4 milliGRAM(s) IV Push every 8 hours PRN  pantoprazole    Tablet 40 milliGRAM(s) Oral before breakfast  rifAXIMin 550 milliGRAM(s) Oral two times a day  simethicone 80 milliGRAM(s) Chew daily  thiamine 100 milliGRAM(s) Oral daily      LABS:                          7.4    15.25 )-----------( 39       ( 13 Sep 2022 04:52 )             20.5     09-13    129<L>  |  84<L>  |  120.3<H>  ----------------------------<  125<H>  3.4<L>   |  23.0  |  5.18<H>    Ca    7.6<L>      13 Sep 2022 04:52    TPro  4.5<L>  /  Alb  3.2<L>  /  TBili  43.0<H>  /  DBili  x   /  AST  85<H>  /  ALT  39  /  AlkPhos  226<H>  09-13    LIVER FUNCTIONS - ( 13 Sep 2022 04:52 )  Alb: 3.2 g/dL / Pro: 4.5 g/dL / ALK PHOS: 226 U/L / ALT: 39 U/L / AST: 85 U/L / GGT: x                   CAPILLARY BLOOD GLUCOSE          RADIOLOGY & ADDITIONAL TESTS:      Consultant notes reviewed    Case discussed with consultant/provider/ family /patient

## 2022-09-13 NOTE — PROGRESS NOTE ADULT - ASSESSMENT
47y/oM with significant EtOH abuse history admitted for decompensated alcoholic cirrhosis, coagulopathy, had episode of epistaxis-now resolved.    started on spironolactone , Lasix , Cipro for SBP ppx , rifaximin ,  s/p 2900 cc fluid removal by IR 9/1.   noted to have hypotension Lasix and spironolactone held .   also low grade temp t-max of 100.4 on 9/8    Decompensated alcoholic cirrhosis w/ascites with ETOH /Hepato- renal syndrome/coagulopathy/thrombocytopenia  called IR for paracentesis   Blood culture 8/26 neg x 2  Ascitic fluid -cxs 8/22 neg x 1   MRCP done showed cirrhosis with portal HTN and large ascites  s/p IR guided repeat  paracenteses 9/1 - repeat for today requested to IR - given vitamin K yesterday   cont lactulose, rifaximin   9/1- Lasix , spironolactone due to hypotension,   cont cipro 500mg qd indefinitely for ppx for SBP due to low total BF protein   s/p ceftriaxone on admission  coagulopathy- c/w Vit K po x3 days (3/3)   follow up op with hepatology after discharge     Worsening Renal failure- metabolic acidosis - Dr Singh following   not a candidate for HD due to ineligibility for liver transplant  Bicarb  Hypokalemia - repleted  Dr Young discussed case w/ Dr. Boggs over the phone at length -was declined [place] transfer  IR - Paracentesis done  had GOC discussion- he's unable to repeat information given with  repeat instructions    Hypotension   2/2 intravascular volume depletion  c/w Midodrine Q8  discussion with nephro, will cont hold diuretic    Coagulopathy with liver cirrhosis-Epistaxis  Discontinued heparin sq for dvt ppx , scds only   s/p  cryoprecipitate , s/p po /iv  vit k , ffp.   no further occult bleeding , monitor for new episodes - ordered PRBC for today with worsening Hgb  heme consulted , recommendations -  ONLY if bleeding and when platelets <50K - transfuse 1-2 units platelets.      Macrocytic anemia   likely due to ETOH / liver disease     Hyponatremia with worsening LAHSONDA -s/p albumin x2   PO fluid restriction 1L daily    Continue to hold diuretics at this time   c/w Octreotide and increase Midodrine  f/u Nephro recs  reached out Dr Diaz-renal consult appreciated- recommend hospice disussion     Severe protein-calorie malnutrition secondary to liver cirrhosis    Adi ramos bilateral     Dispo: [facility] for dc planning     I called through [facility]  [phone]- number for family in [facility]- NO answer   meets hospice criteria for liver and hepato-renal disease - however doesn't seems to understand the severity of his illness and is not able to repeat the information given to him- denies any family members

## 2022-09-13 NOTE — PROGRESS NOTE ADULT - ASSESSMENT
The patient is a 47 year old male with PMH of HTN, ? DVT (not on AC as per pt) and significant ETOH abuse resulting in cirrhosis complicated by ascites initially presented to [facility] for worsening abdominal distension; transferred to [facility] for decompensated alcoholic cirrhosis. While here, he underwent large volume paracentesis on 08/22/2022 with 3.8L fluid removal. Fluid studies negative for SBP. Had second paracentesis on 09/01/2022 with 2.9L fluid removal. Also received 7 day course of steroids. Hospital course notable for epistaxis, coagulopathy, hyponatremia and LASHONDA.    Acute Kidney Injury  -Hepatorenal syndrome vs ATN from pigment nephropathy (hyperbilirubinemia)  -Baseline creatinine  < 0.2mg/dL; Progressive Renal Failure,   -Renal ultrasound - R kidney 12.9cm + L kidney 14.8cm - No obstruction   -On   IV albumin, midodrine + octreotide  -Pt is not a liver transplant candidate; RRT - Not an option as will not alter Prognosis,     -Ongoing GOC discussions, Supportive care,  Will F.U      CM Still Attempting to reach the sister,     D/W RN,    The patient is a 47 year old male with PMH of HTN, ? DVT (not on AC as per pt) and significant ETOH abuse resulting in cirrhosis complicated by ascites initially presented to [facility] for worsening abdominal distension; transferred to [facility] for decompensated alcoholic cirrhosis. While here, he underwent large volume paracentesis on 08/22/2022 with 3.8L fluid removal. Fluid studies negative for SBP. Had second paracentesis on 09/01/2022 with 2.9L fluid removal. Also received 7 day course of steroids. Hospital course notable for epistaxis, coagulopathy, hyponatremia and LASHONDA.    Acute Kidney Injury  -Hepatorenal syndrome: Type 1  vs ATN from pigment nephropathy (hyperbilirubinemia)  -Baseline creatinine  < 0.2mg/dL; Progressive Renal Failure,   -Renal ultrasound - R kidney 12.9cm + L kidney 14.8cm - No obstruction   -On   IV albumin, midodrine + octreotide  -Pt is not a liver transplant candidate; RRT - Not an option as will not alter Prognosis,     Will Replete K + ( Hypokalemia may promote NH3 tonya & worsen Encephalopathy,     -Ongoing [facility] discussions, Supportive care,  Will F.U      CM Still Attempting to reach the sister,     D/W RN,

## 2022-09-13 NOTE — PROGRESS NOTE ADULT - SUBJECTIVE AND OBJECTIVE BOX
Reason for visit: LASHONDA    Subjective: Patient confused. Icteric. Opening eyes to vocal stimuli and tracking.     ROS: Unable to obtain 2/2 patient current clinical condition.    Physical Exam:    Gen:In  no acute distress  MS: Remains confused  Eyes: ++ icterus  HENT: NCAT, MMM  CV: rhythm reg reg, rate normal, no m/g/r  Chest: CTAB, no w/r/r,  Abd: distended  Extremities: No edema    =======================================================  Vital Signs Last 48 Hrs,    T(C): 36.8 (13 Sep 2022 05:07), Max: 36.8 (13 Sep 2022 05:07)  T(F): 98.2 (13 Sep 2022 05:07), Max: 98.2 (13 Sep 2022 05:07)  HR: 68 (13 Sep 2022 05:07) (64 - 68)  BP: 109/64 (13 Sep 2022 05:07) (109/64 - 118/62)  RR: 18 (13 Sep 2022 05:07) (18 - 19)  SpO2: 98% (13 Sep 2022 05:07) (98% - 100%)    O2 Parameters below as of 13 Sep 2022 05:07  Patient On (Oxygen Delivery Method): room air      T(C): 36.4 (12 Sep 2022 11:08), Max: 36.6 (11 Sep 2022 16:47)  T(F): 97.6 (12 Sep 2022 11:08), Max: 97.9 (11 Sep 2022 21:43)  HR: 64 (12 Sep 2022 11:08) (64 - 73)  BP: 110/72 (12 Sep 2022 11:08) (110/72 - 123/70)  RR: 18 (12 Sep 2022 11:08) (18 - 18)  SpO2: 100% (12 Sep 2022 11:08) (96% - 100%)    Parameters below as of 12 Sep 2022 11:08  Patient On (Oxygen Delivery Method): room air      I&O's Summary    11 Sep 2022 07:01  -  12 Sep 2022 07:00  --------------------------------------------------------  IN: 720 mL / OUT: 200 mL / NET: 520 mL      =======================================================  Current Antibiotics:  ciprofloxacin     Tablet 500 milliGRAM(s) Oral daily  rifAXIMin 550 milliGRAM(s) Oral two times a day    Other medications:    albumin human 25% IVPB 100 milliLiter(s) IV Intermittent every 12 hours  Biotene Dry Mouth Oral Rinse 5 milliLiter(s) Swish and Spit every 4 hours  dextrose 5% 1000 milliLiter(s) IV Continuous <Continuous>  folic acid 1 milliGRAM(s) Oral daily  lactulose Syrup 10 Gram(s) Oral four times a day  lidocaine   4% Patch 1 Patch Transdermal every 24 hours  midodrine. 7.5 milliGRAM(s) Oral three times a day  octreotide  Infusion 50 MICROgram(s)/Hr IV Continuous <Continuous>  pantoprazole    Tablet 40 milliGRAM(s) Oral before breakfast  simethicone 80 milliGRAM(s) Chew daily  thiamine 100 milliGRAM(s) Oral daily    =======================================================    Creatinine, Serum: 3.42 mg/dL (09-10-22 @ 02:24)  Creatinine, Serum: 2.95 mg/dL (09-09-22 @ 07:10)  Creatinine, Serum: 2.70 mg/dL (09-08-22 @ 06:10)      ======================================================   Renal (09.01.22 @ 19:28)     ACC: [id number] EXAM:  US KIDNEY(S)                          PROCEDURE DATE:  09/01/2022        INTERPRETATION:  CLINICAL INFORMATION: Acute kidney injury    COMPARISON: CT abdomen and pelvis 8/21/2022    TECHNIQUE: Sonography of the kidneys and bladder.    FINDINGS:  Right kidney: 12.9 cm. Increased echogenicity. No hydronephrosis. No   renal mass or calculus visualized.    Left kidney: 14.8 cm. Increased echogenicity. No hydronephrosis. No renal   mass or calculus visualized.    Urinary bladder: Underdistended and incompletely visualized.    Additional findings: Partially imaged liver demonstrating a coarsened   echotexture and nodular contour, in keeping with known cirrhosis. Small   volume ascites.    IMPRESSION:  Increased renal echogenicity, suggesting medical renal disease. No   hydronephrosis.      YARELI DENT MD; Attending Radiologist  This document has been electronically signed. Sep  1 2022  7:38PM    Creatinine, Serum: 5.18 mg/dL (09.13.22 @ 04:52)     Historical Values  Creatinine, Serum: 5.18 mg/dL (09.13.22 @ 04:52)   Creatinine, Serum: 3.42 mg/dL (09.10.22 @ 02:24)   Creatinine, Serum: 2.95 mg/dL (09.09.22 @ 07:10)   Creatinine, Serum: 2.70 mg/dL (09.08.22 @ 06:10)   Creatinine, Serum: 1.98: Icteric. Interpret with caution mg/dL (09.07.22 @ 06:34)   Creatinine, Serum: 1.67: Icteric. Interpret with caution mg/dL (09.06.22 @ 04:14)   Creatinine, Serum: 1.34: Icteric. Interpret with caution mg/dL (09.05.22 @ 05:15)   Creatinine, Serum: 1.06: Icteric. Interpret with caution mg/dL (09.04.22 @ 17:22)   Creatinine, Serum: 1.08 mg/dL (09.03.22 @ 05:05)   Creatinine, Serum: 0.57: Icteric. Interpret with caution mg/dL (09.02.22 @ 05:35)

## 2022-09-14 NOTE — PROGRESS NOTE ADULT - SUBJECTIVE AND OBJECTIVE BOX
TODD DICKINSON Patient is a 47y old  Male who presents with a chief complaint of Cirrhosis 2/2 ETOH (13 Sep 2022 10:51)     HPI:  46 y/o M w/ a hx of ?Deep Vein Thrombosis (not on AC as per pt), Hypertension, Alcohol use disorder, liver failure transferred to [facility] from [facility] for liver cirrhosis. Pt has an extensive hx of etoh abuse, last drink 3 weeks prior. Recently discharged from [facility] on 8/15 for treatment of alcoholic liver disease. States that over the past two days he has noticed bloating in the abdomen, denying any CP, SOB, Abd pain, paresthesias, fevers, fatigue. Transferred for further GI workup. Seen at bedside, alert and cooperative. Endorses compliance w/ medications and alcohol cessation. ROS Neg other than mentioned.  FHx: Father - DM; Mother non contributory  PSHx: None  SHx: Never smoker; last ETOH 3 weeks ago; No recreational drugs     (20 Aug 2022 16:52)    The patient was seen and evaluated   The patient is in no acute distress.  Denied any fever chest pain, palpitations, shortness of breath, abdominal pain, fever, dysuria, cough, edema       I&O's Summary    13 Sep 2022 07:01  -  14 Sep 2022 07:00  --------------------------------------------------------  IN: 0 mL / OUT: 100 mL / NET: -100 mL      Allergies    No Known Allergies    Intolerances      HEALTH ISSUES - PROBLEM Dx:  Alcoholic cirrhosis of liver          PAST MEDICAL & SURGICAL HISTORY:  Cirrhosis              Vital Signs Last 24 Hrs  T(C): 36.4 (14 Sep 2022 09:10), Max: 36.6 (13 Sep 2022 18:15)  T(F): 97.5 (14 Sep 2022 09:10), Max: 97.8 (13 Sep 2022 18:15)  HR: 75 (14 Sep 2022 09:10) (69 - 75)  BP: 115/75 (14 Sep 2022 09:10) (115/75 - 120/69)  BP(mean): --  RR: 18 (14 Sep 2022 09:10) (18 - 18)  SpO2: 97% (14 Sep 2022 09:10) (97% - 98%)    Parameters below as of 14 Sep 2022 09:10  Patient On (Oxygen Delivery Method): room air    T(C): 36.4 (09-14-22 @ 09:10), Max: 36.6 (09-13-22 @ 18:15)  HR: 75 (09-14-22 @ 09:10) (69 - 75)  BP: 115/75 (09-14-22 @ 09:10) (115/75 - 120/69)  RR: 18 (09-14-22 @ 09:10) (18 - 18)  SpO2: 97% (09-14-22 @ 09:10) (97% - 98%)  Wt(kg): --    PHYSICAL EXAM:    GENERAL: NAD, ill appearing frail-  HEAD:  Atraumatic, Normocephalic  EYES: EOMI, PERRL, conjunctiva and sclera clear  ENMT:  Moist mucous membranes,  No lesions  NECK: Supple, No JVD, Normal thyroid  NERVOUS SYSTEM:  Alert & confused,  Moves upper and lower extremities; CNS-II-XII  CHEST/LUNG: Clear to auscultation bilaterally; No rales, rhonchi, wheezing,   HEART: Regular rate and rhythm; No murmurs,   ABDOMEN: Soft, Nontender, distended; Bowel sounds present  EXTREMITIES:  Peripheral Pulses, No  cyanosis, or edema  SKIN: No rashes or lesions  psychiatry- unable to assess Insight and judgement intact     aluminum hydroxide/magnesium hydroxide/simethicone Suspension 30 milliLiter(s) Oral every 4 hours PRN  Biotene Dry Mouth Oral Rinse 5 milliLiter(s) Swish and Spit every 4 hours  ciprofloxacin     Tablet 500 milliGRAM(s) Oral daily  dextrose 5% 1000 milliLiter(s) IV Continuous <Continuous>  folic acid 1 milliGRAM(s) Oral daily  lactulose Syrup 10 Gram(s) Oral four times a day  lidocaine   4% Patch 1 Patch Transdermal every 24 hours  melatonin 3 milliGRAM(s) Oral at bedtime PRN  midodrine. 7.5 milliGRAM(s) Oral three times a day  octreotide  Infusion 50 MICROgram(s)/Hr IV Continuous <Continuous>  ondansetron Injectable 4 milliGRAM(s) IV Push every 8 hours PRN  pantoprazole    Tablet 40 milliGRAM(s) Oral before breakfast  rifAXIMin 550 milliGRAM(s) Oral two times a day  simethicone 80 milliGRAM(s) Chew daily  thiamine 100 milliGRAM(s) Oral daily      LABS:                          8.6    15.12 )-----------( 40       ( 14 Sep 2022 05:49 )             23.3     09-14    127<L>  |  81<L>  |  123.7<H>  ----------------------------<  100<H>  4.0   |  24.0  |  4.87<H>    Ca    7.9<L>      14 Sep 2022 05:49    TPro  4.5<L>  /  Alb  3.2<L>  /  TBili  46.1<H>  /  DBili  x   /  AST  100<H>  /  ALT  40  /  AlkPhos  230<H>  09-14    LIVER FUNCTIONS - ( 14 Sep 2022 05:49 )  Alb: 3.2 g/dL / Pro: 4.5 g/dL / ALK PHOS: 230 U/L / ALT: 40 U/L / AST: 100 U/L / GGT: x                   CAPILLARY BLOOD GLUCOSE          RADIOLOGY & ADDITIONAL TESTS:      Consultant notes reviewed    Case discussed with consultant/provider/ family /patient

## 2022-09-14 NOTE — CHART NOTE - NSCHARTNOTEFT_GEN_A_CORE
IR received a request for paracentesis.  Pt had paracentesis less than 1 week ago on 9/9 when 4650 cc of ascites was drained.  Currently, the INR is elevated at 2.9.  If another paracentesis is needed, then the pt will need an INR of less than 2.5 or get a transfusion of 2 units of FFP. IR received a request for paracentesis.  Pt had paracentesis less than 1 week ago on 9/9 when 4650 cc of ascites was drained.  The most recent INR is from 9/10 when the INR was elevated at 2.9.  Will repeat PT/INR, but if another paracentesis is needed, then the pt will need an INR of less than 2.5 or get a transfusion of 2 units of FFP.

## 2022-09-14 NOTE — PROGRESS NOTE ADULT - ASSESSMENT
The patient is a 47 year old male with PMH of HTN, ? DVT (not on AC as per pt) and significant ETOH abuse resulting in cirrhosis complicated by ascites initially presented to [facility] for worsening abdominal distension; transferred to [facility] for decompensated alcoholic cirrhosis. While here, he underwent large volume paracentesis on 08/22/2022 with 3.8L fluid removal. Fluid studies negative for SBP. Had second paracentesis on 09/01/2022 with 2.9L fluid removal. Also received 7 day course of steroids. Hospital course notable for epistaxis, coagulopathy, hyponatremia and LASHONDA.    Acute Kidney Injury  -Suspect hepatorenal syndrome vs ATN from pigment nephropathy (hyperbilirubinemia)  -Baseline creatinine is < 0.2mg/dL; scr now continues to worsen now up to 4.87 mg/dl slightly better than yesterday's 5.18  -Renal ultrasound - R kidney 12.9cm + L kidney 14.8cm - no obstruction   -Continue  IV albumin, midodrine + octreotide  -Pt is not a liver transplant candidate; RRT would not improve long term prognosis (? palliative HD will discuss with primary team and family)  -Pt is hospice appropriate but has wished to remain full code  -Ongoing GOC discussions

## 2022-09-14 NOTE — PROGRESS NOTE ADULT - SUBJECTIVE AND OBJECTIVE BOX
Reason for visit: LASHONDA    Subjective: Patient confused. Icteric. Opening eyes to vocal stimuli. no fever.    ROS: Unable to obtain 2/2 patient current clinical condition.    Physical Exam:  Gen: no acute distress  MS: confused  Eyes: ++ icterus  HENT: NCAT, MMM  CV: rhythm reg reg, rate normal, no m/g/r  Chest: CTAB, no w/r/r,  Abd: distended  Extremities: No edema    =======================================================  Vital Signs Last 24 Hrs  T(C): 36.7 (14 Sep 2022 21:22), Max: 36.7 (14 Sep 2022 21:22)  T(F): 98 (14 Sep 2022 21:22), Max: 98 (14 Sep 2022 21:22)  HR: 75 (14 Sep 2022 21:22) (73 - 75)  BP: 115/65 (14 Sep 2022 21:22) (115/65 - 122/77)  BP(mean): 92 (14 Sep 2022 18:11) (92 - 92)  RR: 17 (14 Sep 2022 21:22) (17 - 18)  SpO2: 97% (14 Sep 2022 21:22) (97% - 100%)    I&O's Summary    13 Sep 2022 07:01  -  14 Sep 2022 07:00  --------------------------------------------------------  IN: 0 mL / OUT: 100 mL / NET: -100 mL    14 Sep 2022 07:01  -  14 Sep 2022 22:26  --------------------------------------------------------  IN: 0 mL / OUT: 350 mL / NET: -350 mL      =======================================================  Current Antibiotics:  ciprofloxacin     Tablet 500 milliGRAM(s) Oral daily  rifAXIMin 550 milliGRAM(s) Oral two times a day    Other medications:  Biotene Dry Mouth Oral Rinse 5 milliLiter(s) Swish and Spit every 4 hours  dextrose 5% 1000 milliLiter(s) IV Continuous <Continuous>  folic acid 1 milliGRAM(s) Oral daily  lactulose Syrup 10 Gram(s) Oral four times a day  lidocaine   4% Patch 1 Patch Transdermal every 24 hours  midodrine. 7.5 milliGRAM(s) Oral three times a day  octreotide  Infusion 50 MICROgram(s)/Hr IV Continuous <Continuous>  pantoprazole    Tablet 40 milliGRAM(s) Oral before breakfast  simethicone 80 milliGRAM(s) Chew daily  thiamine 100 milliGRAM(s) Oral daily    =======================================================  09-14    127<L>  |  81<L>  |  123.7<H>  ----------------------------<  100<H>  4.0   |  24.0  |  4.87<H>    Ca    7.9<L>      14 Sep 2022 05:49    TPro  4.5<L>  /  Alb  3.2<L>  /  TBili  46.1<H>  /  DBili  x   /  AST  100<H>  /  ALT  40  /  AlkPhos  230<H>  09-14    Creatinine, Serum: 4.87 mg/dL (09-14-22 @ 05:49)  Creatinine, Serum: 5.18 mg/dL (09-13-22 @ 04:52)  Creatinine, Serum: 3.42 mg/dL (09-10-22 @ 02:24)      =======================================================

## 2022-09-14 NOTE — PROGRESS NOTE ADULT - ASSESSMENT
47y/oM with significant EtOH abuse history admitted for decompensated alcoholic cirrhosis, coagulopathy, had episode of epistaxis-now resolved.    started on spironolactone , Lasix , Cipro for SBP ppx , rifaximin ,  s/p 2900 cc fluid removal by IR 9/1.   noted to have hypotension Lasix and spironolactone held .   also low grade temp t-max of 100.4 on 9/8    Decompensated alcoholic cirrhosis w/ascites with ETOH /Hepato- renal syndrome/coagulopathy/thrombocytopenia  called IR for paracentesis   Blood culture 8/26 neg x 2  Ascitic fluid -cxs 8/22 neg x 1   MRCP done showed cirrhosis with portal HTN and large ascites  s/p IR guided repeat  paracenteses 9/1 - repeat for today requested to IR - given vitamin K yesterday   cont lactulose, rifaximin   9/1- Lasix , spironolactone due to hypotension,   cont cipro 500mg qd indefinitely for ppx for SBP due to low total BF protein   s/p ceftriaxone on admission  coagulopathy- c/w Vit K po x3 days (3/3)   follow up op with hepatology after discharge   consult for ethics placed - possible decision on DNR DNI- placement   Worsening Renal failure- metabolic acidosis - Dr Singh following   not a candidate for HD due to ineligibility for liver transplant  Bicarb  Hypokalemia - repleted  Dr Young discussed case w/ Dr. Boggs over the phone at length -was declined [place] transfer  IR - Paracentesis done  had GOC discussion- he's unable to repeat information given with  repeat instructions    Hypotension   2/2 intravascular volume depletion  c/w Midodrine Q8  discussion with nephro, will cont hold diuretic    Coagulopathy with liver cirrhosis-Epistaxis  Discontinued heparin sq for dvt ppx , scds only   s/p  cryoprecipitate , s/p po /iv  vit k , ffp.   no further occult bleeding , monitor for new episodes - ordered PRBC for today with worsening Hgb  heme consulted , recommendations -  ONLY if bleeding and when platelets <50K - transfuse 1-2 units platelets.      Macrocytic anemia   likely due to ETOH / liver disease     Hyponatremia with worsening LASHONDA -s/p albumin x2   PO fluid restriction 1L daily    Continue to hold diuretics at this time   c/w Octreotide and increase Midodrine  f/u Nephro recs  reached out Dr Diaz-renal consult appreciated- recommend hospice disussion     Severe protein-calorie malnutrition secondary to liver cirrhosis    Adi ramos bilateral     Dispo: [facility] for dc planning

## 2022-09-14 NOTE — CHART NOTE - NSCHARTNOTEFT_GEN_A_CORE
Ethics consult initiated. Chart reviewed. Need discussion with team.     Many social complexities.     Consult in progress.    Mona Alfaro MD  Ethics Attending   [phone] Ethics consult initiated. Chart reviewed. Need discussion with team.     Many social complexities.     Case discussed with AUGUST Fuentes LCSW ( Manager).     Consult in progress.    Mona Alfaro MD  Ethics Attending   [phone]

## 2022-09-15 DIAGNOSIS — F10.239 ALCOHOL DEPENDENCE WITH WITHDRAWAL, UNSPECIFIED: ICD-10-CM

## 2022-09-15 DIAGNOSIS — E87.6 HYPOKALEMIA: ICD-10-CM

## 2022-09-15 DIAGNOSIS — K70.40 ALCOHOLIC HEPATIC FAILURE WITHOUT COMA: ICD-10-CM

## 2022-09-15 DIAGNOSIS — E87.1 HYPO-OSMOLALITY AND HYPONATREMIA: ICD-10-CM

## 2022-09-15 NOTE — CHART NOTE - NSCHARTNOTEFT_GEN_A_CORE
Upon discussion with Dr. Schaefer, will place a tunneled peritoneal drain tomorrow AM to allow prn ascites drainage.  Due to high INR, pt will need 3 units of FFP prior to the procedure (tomorrow AM).

## 2022-09-15 NOTE — PROGRESS NOTE ADULT - SUBJECTIVE AND OBJECTIVE BOX
Reason for visit: LASHONDA    Subjective: Patient confused. Icteric. Opening eyes to vocal stimuli. no fever.    ROS: Unable to obtain 2/2 patient current clinical condition.    Physical Exam:    Gen: In no acute distress  MS: confused  Eyes: ++ icterus  HENT: NCAT, MMM  CV: rhythm reg reg, rate normal, no m/g/r  Chest: CTAB, no w/r/r,  Abd: distended  Extremities: No edema    =======================================================  ICU Vital Signs Last 24 Hrs  T(C): 36.4 (15 Sep 2022 09:12), Max: 36.7 (14 Sep 2022 21:22)  T(F): 97.5 (15 Sep 2022 09:12), Max: 98 (14 Sep 2022 21:22)  HR: 65 (15 Sep 2022 09:12) (65 - 75)  BP: 105/65 (15 Sep 2022 09:12) (105/65 - 122/77)  BP(mean): 92 (14 Sep 2022 18:11) (92 - 92)    RR: 18 (15 Sep 2022 09:12) (17 - 18)  SpO2: 97% (15 Sep 2022 09:12) (97% - 100%)    O2 Parameters below as of 15 Sep 2022 09:12  Patient On (Oxygen Delivery Method): room air    I&O's Summary    13 Sep 2022 07:  -  14 Sep 2022 07:00  --------------------------------------------------------  IN: 0 mL / OUT: 100 mL / NET: -100 mL    14 Sep 2022 07:01  -  14 Sep 2022 22:26  --------------------------------------------------------  IN: 0 mL / OUT: 350 mL / NET: -350 mL      =======================================================  Current Antibiotics:  ciprofloxacin     Tablet 500 milliGRAM(s) Oral daily  rifAXIMin 550 milliGRAM(s) Oral two times a day    Other medications:  Biotene Dry Mouth Oral Rinse 5 milliLiter(s) Swish and Spit every 4 hours  dextrose 5% 1000 milliLiter(s) IV Continuous <Continuous>  folic acid 1 milliGRAM(s) Oral daily  lactulose Syrup 10 Gram(s) Oral four times a day  lidocaine   4% Patch 1 Patch Transdermal every 24 hours  midodrine. 7.5 milliGRAM(s) Oral three times a day  octreotide  Infusion 50 MICROgram(s)/Hr IV Continuous <Continuous>  pantoprazole    Tablet 40 milliGRAM(s) Oral before breakfast  simethicone 80 milliGRAM(s) Chew daily  thiamine 100 milliGRAM(s) Oral daily      127<L>  |  81<L>  |  123.7<H>  ----------------------------<  100<H>  Ca:7.9<L> (14 Sep 2022 05:49)  4.0     |  24.0   |  4.87<H>      TPro  4.5<L>  /  Alb  3.2<L>  /  TBili  46.1<H>  /  DBili  x      /  AST  100<H>  /  ALT  40     /  AlkPhos  230<H>  14 Sep 2022 05:49                        8.6<L>  15.12<H> )-----------( 40<L>    ( 14 Sep 2022 05:49 )             23.3<L>    Phos:-- M.3 mg/dL PTH:-- Uric acid:-- Serum Osm:--  Ferritin:-- Iron:-- TIBC:-- Tsat:--  B12:-- TSH:-- ( @ 06:10)      UProt:-- UCr:-- P/C Ratio:-- 24 hour Prot:-- UVol:-- CrCl:--  Iqra:<30 mmol/L UOsm:342 mosm/kg UVol:-- UCl:-- UK:-- ( @ 20:20)      =======================================================      127<L>  |  81<L>  |  123.7<H>  ----------------------------<  100<H>  4.0   |  24.0  |  4.87<H>    Ca    7.9<L>      14 Sep 2022 05:49    TPro  4.5<L>  /  Alb  3.2<L>  /  TBili  46.1<H>  /  DBili  x   /  AST  100<H>  /  ALT  40  /  AlkPhos  230<H>      Creatinine, Serum: 4.87 mg/dL (22 @ 05:49)  Creatinine, Serum: 5.18 mg/dL (22 @ 04:52)  Creatinine, Serum: 3.42 mg/dL (09-10-22 @ 02:24)      =======================================================    Blood Urea Nitrogen, Serum: 123.7 mg/dL (22 @ 05:49)       Historical Values  Blood Urea Nitrogen, Serum: 123.7 mg/dL (22 @ 05:49)   Blood Urea Nitrogen, Serum: 120.3 mg/dL (22 @ 04:52)   Blood Urea Nitrogen, Serum: 89.5 mg/dL (09.10.22 @ 02:24)   Blood Urea Nitrogen, Serum: 82.4 mg/dL (22 @ 07:10)   Blood Urea Nitrogen, Serum: 78.4 mg/dL (22 @ 06:10)   Blood Urea Nitrogen, Serum: 67.9 mg/dL (22 @ 06:34)   Blood Urea Nitrogen, Serum: 58.7 mg/dL (22 @ 04:14)   Blood Urea Nitrogen, Serum: 49.0 mg/dL (22 @ 05:15)   Blood Urea Nitrogen, Serum: 48.9 mg/dL (22 @ 17:22)   Blood Urea Nitrogen, Serum: 49.7 mg/dL (22 @ 05:05) reatinine, Serum: 4.87 mg/dL (22 @ 05:49)       Historical Values  Creatinine, Serum: 4.87 mg/dL (22 @ 05:49)   Creatinine, Serum: 5.18 mg/dL (22 @ 04:52)                                    Progress Notes    PROGRESS NOTE;    Date & Time of Note   2022 08:00    Notes    Notes: Late entry for   Pt remains acute, unable to transfer to [facility], Several GOC conversations had,  Pall following.  Pt open to Hospice but unable to comprehend DNR/I status.   Complex SW vying for LT Hospice but running into barriers.  PADMAJA had discussion  w/ PMD Olive who denies the need for Ethics/Pall as she is a certified Pall  MD.       PADMAJA re-broached the need for ethics and a 2PC for DNR status.  MD acquiesced and  will involve the needed departments / paperwork.  PADMAJA remans folnaun.       Electronically signed by:  Adriana Medel  Electronically signed on:  2022  08:03

## 2022-09-15 NOTE — CHART NOTE - NSCHARTNOTEFT_GEN_A_CORE
Repeat INR yesterday was 2.96, still too high for paracentesis.  If paracentesis still desired, pt will need transfusion of 2 units of FFP.  Please contact IR regarding whether FFP will be given.

## 2022-09-15 NOTE — PROGRESS NOTE ADULT - SUBJECTIVE AND OBJECTIVE BOX
TODD DICKINSON Patient is a 47y old  Male who presents with a chief complaint of Cirrhosis 2/2 ETOH (15 Sep 2022 10:15)     HPI:  48 y/o M w/ a hx of ?Deep Vein Thrombosis (not on AC as per pt), Hypertension, Alcohol use disorder, liver failure transferred to [facility] from [facility] for liver cirrhosis. Pt has an extensive hx of etoh abuse, last drink 3 weeks prior. [place] [facility] [place] treatment of alcoholic liver disease. States that over the past two days he has noticed bloating in the abdomen, denying any CP, SOB, Abd pain, paresthesias, fevers, fatigue. Transferred for further GI workup. Seen at bedside, alert and cooperative. Endorses compliance w/ medications and alcohol cessation. ROS Neg other than mentioned.  FHx: Father - DM; Mother non contributory  PSHx: None  SHx: Never smoker; last ETOH 3 weeks ago; No recreational drugs     (20 Aug 2022 16:52)    The patient was seen and evaluated   The patient is in no acute distress.  Denied any fever chest pain, palpitations, shortness of breath, fever, dysuria, cough, edema   Complains of abdominal distension    I&O's Summary    14 Sep 2022 07:01  -  15 Sep 2022 07:00  --------------------------------------------------------  IN: 660 mL / OUT: 350 mL / NET: 310 mL      Allergies    No Known Allergies    Intolerances      HEALTH ISSUES - PROBLEM Dx:  Alcoholic cirrhosis of liver          PAST MEDICAL & SURGICAL HISTORY:  Cirrhosis              Vital Signs Last 24 Hrs  T(C): 36.4 (15 Sep 2022 09:12), Max: 36.7 (14 Sep 2022 21:22)  T(F): 97.5 (15 Sep 2022 09:12), Max: 98 (14 Sep 2022 21:22)  HR: 65 (15 Sep 2022 09:12) (65 - 75)  BP: 105/65 (15 Sep 2022 09:12) (105/65 - 122/77)  BP(mean): 92 (14 Sep 2022 18:11) (92 - 92)  RR: 18 (15 Sep 2022 09:12) (17 - 18)  SpO2: 97% (15 Sep 2022 09:12) (97% - 100%)    Parameters below as of 15 Sep 2022 09:12  Patient On (Oxygen Delivery Method): room air    T(C): 36.4 (09-15-22 @ 09:12), Max: 36.7 (09-14-22 @ 21:22)  HR: 65 (09-15-22 @ 09:12) (65 - 75)  BP: 105/65 (09-15-22 @ 09:12) (105/65 - 122/77)  RR: 18 (09-15-22 @ 09:12) (17 - 18)  SpO2: 97% (09-15-22 @ 09:12) (97% - 100%)  Wt(kg): --    PHYSICAL EXAM:    GENERAL: NAD, jaundiced , chronically ill appearing   HEAD:  Atraumatic, Normocephalic  EYES: EOMI, PERRL, conjunctiva and sclera clear  ENMT:  Moist mucous membranes,  No lesions  NECK: Supple, No JVD, Normal thyroid  NERVOUS SYSTEM:  Alert & in bed Moves upper and lower extremities; CNS-II-XII  CHEST/LUNG: Clear to auscultation bilaterally; No rales, rhonchi, wheezing,   HEART: Regular rate and rhythm; No murmurs,   ABDOMEN: Soft, Nontender, distended; Bowel sounds present  EXTREMITIES:  Peripheral Pulses, No  cyanosis, or edema  SKIN: No rashes or lesions  psychiatry- unclear Insight and judgement intact     aluminum hydroxide/magnesium hydroxide/simethicone Suspension 30 milliLiter(s) Oral every 4 hours PRN  Biotene Dry Mouth Oral Rinse 5 milliLiter(s) Swish and Spit every 4 hours  ciprofloxacin     Tablet 500 milliGRAM(s) Oral daily  dextrose 5% 1000 milliLiter(s) IV Continuous <Continuous>  folic acid 1 milliGRAM(s) Oral daily  lactulose Syrup 10 Gram(s) Oral four times a day  lidocaine   4% Patch 1 Patch Transdermal every 24 hours  melatonin 3 milliGRAM(s) Oral at bedtime PRN  midodrine. 7.5 milliGRAM(s) Oral three times a day  octreotide  Infusion 50 MICROgram(s)/Hr IV Continuous <Continuous>  ondansetron Injectable 4 milliGRAM(s) IV Push every 8 hours PRN  pantoprazole    Tablet 40 milliGRAM(s) Oral before breakfast  phytonadione   Solution 5 milliGRAM(s) Oral daily  rifAXIMin 550 milliGRAM(s) Oral two times a day  simethicone 80 milliGRAM(s) Chew daily  thiamine 100 milliGRAM(s) Oral daily      LABS:                          8.6    15.12 )-----------( 40       ( 14 Sep 2022 05:49 )             23.3     09-14    127<L>  |  81<L>  |  123.7<H>  ----------------------------<  100<H>  4.0   |  24.0  |  4.87<H>    Ca    7.9<L>      14 Sep 2022 05:49    TPro  4.5<L>  /  Alb  3.2<L>  /  TBili  46.1<H>  /  DBili  x   /  AST  100<H>  /  ALT  40  /  AlkPhos  230<H>  09-14    LIVER FUNCTIONS - ( 14 Sep 2022 05:49 )  Alb: 3.2 g/dL / Pro: 4.5 g/dL / ALK PHOS: 230 U/L / ALT: 40 U/L / AST: 100 U/L / GGT: x           PT/INR - ( 14 Sep 2022 14:20 )   PT: 34.7 sec;   INR: 2.96 ratio         PTT - ( 14 Sep 2022 14:20 )  PTT:90.1 sec        CAPILLARY BLOOD GLUCOSE          RADIOLOGY & ADDITIONAL TESTS:      Consultant notes reviewed    Case discussed with consultant/provider/ family /patient

## 2022-09-15 NOTE — PROGRESS NOTE ADULT - ASSESSMENT
The patient is a 47 year old male with PMH of HTN, ? DVT (not on AC as per pt) and significant ETOH abuse resulting in cirrhosis complicated by ascites initially presented to [facility] for worsening abdominal distension; transferred to [facility] for decompensated alcoholic cirrhosis. While here, he underwent large volume paracentesis on 08/22/2022 with 3.8L fluid removal. Fluid studies negative for SBP. Had second paracentesis on 09/01/2022 with 2.9L fluid removal. Also received 7 day course of steroids. Hospital course notable for epistaxis, coagulopathy, hyponatremia and LASHONDA.    Acute Kidney Injury    Very Catabolic,     -Hepatorenal syndrome-Type 1 vs ATN,  -Baseline Scr.,  < 0.2mg/dL;   -Renal ultrasound - R kidney 12.9cm + L kidney 14.8cm - no obstruction   -On   IV albumin, midodrine + octreotide  -Pt is not a liver transplant candidate; RRT would not improve long term prognosis ,  -Pt is hospice appropriate but has wished to remain full code  -Ongoing GOC discussions,    CM Progress as Above,     D/W RN,

## 2022-09-15 NOTE — CHART NOTE - NSCHARTNOTEFT_GEN_A_CORE
Source: Patient [ ]  Family [ ]   other [ x]    Current Diet:   Diet, NPO after Midnight:      NPO Start Date: 15-Sep-2022,   NPO Start Time: 23:59 (09-15-22 @ 12:08)  Diet, Regular:   60 gm Protein (PRO60G)  1000mL Fluid Restriction ([id number]) (08-25-22 @ 14:14)    Patient reports [ ] nausea  [ ] vomiting [ ] diarrhea [ ] constipation  [ ]chewing problems [ ] swallowing issues  [ ] other:     PO intake:  < 50% [x ]   50-75%  [ ]   %  [ ]  other :    Source for PO intake [ ] Patient [ ] family [x ] chart [ ] staff [ ] other    Current Weight:   (9/15)  147.2 lbs RD bedscale   (8/30)  147.7 lbs   (8/20)  139.9 lbs     % Weight Change: Weight relatively stable, noted with recurrent ascites and paracentesis     Pertinent Medications: MEDICATIONS  (STANDING):  Biotene Dry Mouth Oral Rinse 5 milliLiter(s) Swish and Spit every 4 hours  ciprofloxacin     Tablet 500 milliGRAM(s) Oral daily  dextrose 5% 1000 milliLiter(s) (50 mL/Hr) IV Continuous <Continuous>  folic acid 1 milliGRAM(s) Oral daily  lactulose Syrup 10 Gram(s) Oral four times a day  lidocaine   4% Patch 1 Patch Transdermal every 24 hours  midodrine. 7.5 milliGRAM(s) Oral three times a day  octreotide  Infusion 50 MICROgram(s)/Hr (10 mL/Hr) IV Continuous <Continuous>  pantoprazole    Tablet 40 milliGRAM(s) Oral before breakfast  phytonadione   Solution 5 milliGRAM(s) Oral daily  rifAXIMin 550 milliGRAM(s) Oral two times a day  simethicone 80 milliGRAM(s) Chew daily  thiamine 100 milliGRAM(s) Oral daily    MEDICATIONS  (PRN):  aluminum hydroxide/magnesium hydroxide/simethicone Suspension 30 milliLiter(s) Oral every 4 hours PRN Dyspepsia  melatonin 3 milliGRAM(s) Oral at bedtime PRN Insomnia  ondansetron Injectable 4 milliGRAM(s) IV Push every 8 hours PRN Nausea and/or Vomiting    Pertinent Labs: CBC Full  -  ( 14 Sep 2022 05:49 )  WBC Count : 15.12 K/uL  RBC Count : 2.51 M/uL  Hemoglobin : 8.6 g/dL  Hematocrit : 23.3 %  Platelet Count - Automated : 40 K/uL  Mean Cell Volume : 92.8 fl  Mean Cell Hemoglobin : 34.3 pg  Mean Cell Hemoglobin Concentration : 36.9 gm/dL  Auto Neutrophil # : 12.21 K/uL  Auto Lymphocyte # : 0.99 K/uL  Auto Monocyte # : 1.33 K/uL  Auto Eosinophil # : 0.21 K/uL  Auto Basophil # : 0.03 K/uL  Auto Neutrophil % : 80.8 %  Auto Lymphocyte % : 6.5 %  Auto Monocyte % : 8.8 %  Auto Eosinophil % : 1.4 %  Auto Basophil % : 0.2 %    Skin: No skin breakdown/edema noted     Nutrition focused physical exam previously conducted - found signs of malnutrition [ ]absent [ x]present    Subcutaneous fat loss: [x ] Orbital fat pads region, [ x]Buccal fat region, [ ]Triceps region,  [ ]Ribs region    Muscle wasting: [x ]Temples region, [x ]Clavicle region, [x ]Shoulder region, [ ]Scapula region, [ ]Interosseous region,  [ ]thigh region, [ ]Calf region    Estimated Needs:   [ x] no change since previous assessment  [ ] recalculated:     Current Nutrition Diagnosis: Pt remains at high nutrition risk and meets criteria for severe, chronic malnutrition related to inability to meet sufficient protein-energy in setting of decompensated alcoholic cirrhosis w/ascites as evidenced by mod/sev muscle loss, mod fat loss; 20% wt loss x ~2 yrs. Pt s/p paracentesis 9/9. Aware plan for paracentesis when INR is within better range. Pt with significantly decreased Po intake since previous assessment 2/2 nausea and feeling full, likely 2/2 ascites. Nephrology following. Palliative and hospice following, GOC unclear 2/2 AMS.     Recommendations:   1) Nepro BID   2) Rx Nephro-jesus daily   3) Consider appetite stimulant   4) Monitor weights daily for trend/accuracy   5) Encourage HBV protein sources    6) Monitor weights daily for trend/accuracy     Monitoring and Evaluation:   [ ] PO intake [ ] Tolerance to diet prescription [X] Weights  [X] Follow up per protocol [X] Labs:

## 2022-09-15 NOTE — PROGRESS NOTE ADULT - ASSESSMENT
47y/oM with significant EtOH abuse history admitted for decompensated alcoholic cirrhosis, coagulopathy, had episode of epistaxis-now resolved.    started on spironolactone , Lasix , Cipro for SBP ppx , rifaximin ,  s/p 2900 cc fluid removal by IR 9/1.   noted to have hypotension Lasix and spironolactone held .   also low grade temp t-max of 100.4 on 9/8    Decompensated alcoholic cirrhosis w/ascites with ETOH /Hepato- renal syndrome/coagulopathy/thrombocytopenia  called IR for paracentesis - vitamin K and FFP ordered- D/W IR Dr Mata- will be done tomorrow   Blood culture 8/26 neg x 2  Ascitic fluid -cxs 8/22 neg x 1   MRCP done showed cirrhosis with portal HTN and large ascites  s/p IR guided repeat  paracenteses 9/1 - repeat for today requested to IR - given vitamin K yesterday   cont lactulose, rifaximin   9/1- Lasix , spironolactone due to hypotension,   cont cipro 500mg qd indefinitely for ppx for SBP due to low total BF protein   s/p ceftriaxone on admission  coagulopathy- ffp  follow up op with hepatology after discharge   consult for ethics placed - possible decision on DNR DNI- placement     Worsening Renal failure- metabolic acidosis - Dr Singh following   not a candidate for HD due to ineligibility for liver transplant  Bicarb  Hypokalemia - repleted  Dr Young discussed case w/ Dr. Boggs over the phone at length -was declined [place] transfer  IR - Paracentesis done  had GOC discussion- he's unable to repeat information given with  repeat instructions    Hypotension   2/2 intravascular volume depletion  c/w Midodrine Q8  discussion with nephro, will cont hold diuretic    Coagulopathy with liver cirrhosis-Epistaxis  Discontinued heparin sq for dvt ppx , scds only   s/p  cryoprecipitate , s/p po /iv  vit k , ffp.   no further occult bleeding , monitor for new episodes - ordered PRBC for today with worsening Hgb  heme consulted , recommendations -  ONLY if bleeding and when platelets <50K - transfuse 1-2 units platelets.      Macrocytic anemia   likely due to ETOH / liver disease     Hyponatremia with worsening LASHONDA -s/p albumin x2   PO fluid restriction 1L daily    Continue to hold diuretics at this time   c/w Octreotide and increase Midodrine  f/u Nephro recs    reached out renal consult appreciated- recommend hospice discussion     Severe protein-calorie malnutrition secondary to liver cirrhosis    Adi ramos bilateral     Dispo: [facility] for dc planning

## 2022-09-16 NOTE — BRIEF OPERATIVE NOTE - NSICDXBRIEFPOSTOP_GEN_ALL_CORE_FT
POST-OP DIAGNOSIS:  Ascites 09-Sep-2022 14:06:03  Nahun Finn  
POST-OP DIAGNOSIS:  Ascites 09-Sep-2022 14:06:03  Nahun Finn

## 2022-09-16 NOTE — BRIEF OPERATIVE NOTE - OPERATION/FINDINGS
Tunneled ASEPT peritoneal drainage catherter inserted in RLQ with US and Fluoro guidance. 5550 cc of clear yellow ascites drained.
6 fr pigtail in LLQ.  4650 cc clear yellow fluid drained.

## 2022-09-16 NOTE — PROGRESS NOTE ADULT - CONVERSATION/DISCUSSION
Diagnosis/Prognosis/Treatment Options
Diagnosis/Prognosis/MOLST Discussed
Diagnosis/Prognosis/MOLST Discussed/Hospice Referral/Palliative Care Referral
Diagnosis/MOLST Discussed

## 2022-09-16 NOTE — BRIEF OPERATIVE NOTE - NSICDXBRIEFPREOP_GEN_ALL_CORE_FT
PRE-OP DIAGNOSIS:  Ascites 09-Sep-2022 14:05:52  Nahun Finn  
PRE-OP DIAGNOSIS:  Ascites 09-Sep-2022 14:05:52  Nahun Finn

## 2022-09-16 NOTE — PROGRESS NOTE ADULT - SUBJECTIVE AND OBJECTIVE BOX
TODD DICKINSON Patient is a 47y old  Male who presents with a chief complaint of Cirrhosis 2/2 ETOH (15 Sep 2022 13:08)     HPI:  48 y/o M w/ a hx of ?Deep Vein Thrombosis (not on AC as per pt), Hypertension, Alcohol use disorder, liver failure transferred to [facility] from [facility] for liver cirrhosis. Pt has an extensive hx of etoh abuse, last drink 3 weeks prior. [place] [facility] [place] of [place] liver disease. States that over the past two days he has noticed bloating in the abdomen, denying any CP, SOB, Abd pain, paresthesias, fevers, fatigue. Transferred for further GI workup. Seen at bedside, alert and cooperative. Endorses compliance w/ medications and alcohol cessation. ROS Neg other than mentioned.  FHx: Father - DM; Mother non contributory  PSHx: None  SHx: Never smoker; last ETOH 3 weeks ago; No recreational drugs     (20 Aug 2022 16:52)    The patient was seen and evaluated - speaking to me in english today- agrees and verbalizes understanding of his terminal illness- states hes hoping to make it to his country and die there but knows that he may die in the waiting period and may not be well enough to travel- states he knows Elbert Dasilva is in charge- spoke to patient and conformed above with  at bedside Laurence and nurse and nurse manger   The patient is in no acute distress.- complains of itching   Denied any fever chest pain, palpitations, shortness of breath, abdominal pain, fever, dysuria, cough, edema       I&O's Summary    15 Sep 2022 07:01  -  16 Sep 2022 07:00  --------------------------------------------------------  IN: 600 mL / OUT: 0 mL / NET: 600 mL      Allergies    No Known Allergies    Intolerances      HEALTH ISSUES - PROBLEM Dx:  Alcoholic cirrhosis of liver          PAST MEDICAL & SURGICAL HISTORY:  Cirrhosis              Vital Signs Last 24 Hrs  T(C): 36.4 (16 Sep 2022 08:00), Max: 36.6 (15 Sep 2022 21:02)  T(F): 97.5 (16 Sep 2022 08:00), Max: 97.8 (15 Sep 2022 21:02)  HR: 73 (16 Sep 2022 08:00) (73 - 79)  BP: 108/68 (16 Sep 2022 08:00) (95/55 - 119/67)  BP(mean): --  RR: 18 (16 Sep 2022 08:00) (18 - 18)  SpO2: 97% (16 Sep 2022 08:00) (96% - 98%)    Parameters below as of 16 Sep 2022 08:00  Patient On (Oxygen Delivery Method): room air    T(C): 36.4 (09-16-22 @ 08:00), Max: 36.6 (09-15-22 @ 21:02)  HR: 73 (09-16-22 @ 08:00) (73 - 79)  BP: 108/68 (09-16-22 @ 08:00) (95/55 - 119/67)  RR: 18 (09-16-22 @ 08:00) (18 - 18)  SpO2: 97% (09-16-22 @ 08:00) (96% - 98%)  Wt(kg): --    PHYSICAL EXAM:    GENERAL: NAD, jaundiced   HEAD:  Atraumatic, Normocephalic  EYES: EOMI, PERRL, conjunctiva and sclera ywllow  ENMT:  Moist mucous membranes,  No lesions  NECK: Supple, No JVD, Normal thyroid  NERVOUS SYSTEM:  Alert & Oriented X3,  Moves upper and lower extremities; CNS-II-XII  CHEST/LUNG: Clear to auscultation bilaterally; No rales, rhonchi, wheezing,   HEART: Regular rate and rhythm; No murmurs,   ABDOMEN: Soft, Nontender,distended; Bowel sounds present  EXTREMITIES:  Peripheral Pulses, No  cyanosis, or edema  SKIN: No rashes or lesions  psychiatry- mood and affect appropriate Insight and judgement intact     aluminum hydroxide/magnesium hydroxide/simethicone Suspension 30 milliLiter(s) Oral every 4 hours PRN  Biotene Dry Mouth Oral Rinse 5 milliLiter(s) Swish and Spit every 4 hours  ciprofloxacin     Tablet 500 milliGRAM(s) Oral daily  dextrose 5% 1000 milliLiter(s) IV Continuous <Continuous>  folic acid 1 milliGRAM(s) Oral daily  lactulose Syrup 10 Gram(s) Oral four times a day  lidocaine   4% Patch 1 Patch Transdermal every 24 hours  melatonin 3 milliGRAM(s) Oral at bedtime PRN  midodrine. 7.5 milliGRAM(s) Oral three times a day  morphine  - Injectable 2 milliGRAM(s) IV Push every 4 hours PRN  morphine  - Injectable 4 milliGRAM(s) IV Push every 4 hours PRN  octreotide  Infusion 50 MICROgram(s)/Hr IV Continuous <Continuous>  ondansetron Injectable 4 milliGRAM(s) IV Push every 8 hours PRN  pantoprazole    Tablet 40 milliGRAM(s) Oral before breakfast  phytonadione   Solution 5 milliGRAM(s) Oral daily  rifAXIMin 550 milliGRAM(s) Oral two times a day  simethicone 80 milliGRAM(s) Chew daily  thiamine 100 milliGRAM(s) Oral daily      LABS:                          7.3    15.17 )-----------( 40       ( 16 Sep 2022 06:15 )             20.4     09-16    136  |  85<L>  |  133.5<H>  ----------------------------<  111<H>  3.8   |  28.0  |  5.04<H>    Ca    8.5      16 Sep 2022 06:15    TPro  4.5<L>  /  Alb  3.0<L>  /  TBili  48.9<H>  /  DBili  x   /  AST  132<H>  /  ALT  48<H>  /  AlkPhos  264<H>  09-16    LIVER FUNCTIONS - ( 16 Sep 2022 06:15 )  Alb: 3.0 g/dL / Pro: 4.5 g/dL / ALK PHOS: 264 U/L / ALT: 48 U/L / AST: 132 U/L / GGT: x           PT/INR - ( 16 Sep 2022 06:15 )   PT: 39.5 sec;   INR: 3.36 ratio         PTT - ( 16 Sep 2022 06:15 )  PTT:99.7 sec        CAPILLARY BLOOD GLUCOSE          RADIOLOGY & ADDITIONAL TESTS:      Consultant notes reviewed    Case discussed with consultant/provider/ family /patient

## 2022-09-16 NOTE — PROGRESS NOTE ADULT - ASSESSMENT
47y/oM with significant EtOH abuse history admitted for decompensated alcoholic cirrhosis, coagulopathy, had episode of epistaxis-now resolved.    started on spironolactone , Lasix , Cipro for SBP ppx , rifaximin ,  s/p 2900 cc fluid removal by IR 9/1.   noted to have hypotension Lasix and spironolactone held .   also low grade temp t-max of 100.4 on 9/8    Decompensated alcoholic cirrhosis w/ascites with ETOH /Hepato- renal syndrome/coagulopathy/thrombocytopenia  called IR for paracentesis - multiple multiple vitamin K and FFP given- D/W IR Dr Mata- will be done today  Blood culture 8/26 neg x 2  Ascitic fluid -cxs 8/22 neg x 1   MRCP done showed cirrhosis with portal HTN and large ascites  s/p IR guided repeat  paracenteses 9/1 - repeat for today requested to IR - given vitamin K yesterday   cont lactulose, rifaximin   9/1- Lasix , spironolactone due to hypotension,   cont cipro 500mg qd indefinitely for ppx for SBP due to low total BF protein   s/p ceftriaxone on admission  coagulopathy- ffp  follow up op with hepatology after discharge   consult for ethics placed - possible decision on DNR DNI- placement     hyperbilirubinemia- cholestyramine 4 bid    Worsening Renal failure- metabolic acidosis - Dr Singh following   not a candidate for HD due to ineligibility for liver transplant  Bicarb  Hypokalemia - repleted  Dr Young discussed case w/ Dr. Boggs over the phone at length -was declined [place] transfer  IR - Paracentesis done repeatedly three time a week, DW IR_ at this point may need a tube placed and left so he can drain at facility or home on discharge   had GOC discussion-he verbalizes understanding of the terminal nature of his illness and wishes for no CPR and signs his own DNR DNI MOLST with  and RN as witnesses     Hypotension   2/2 intravascular volume depletion  c/w Midodrine Q8  discussion with nephro, will cont hold diuretic    Coagulopathy with liver cirrhosis-Epistaxis  Discontinued heparin sq for dvt ppx , scds only   s/p multiple FFP cryoprecipitate , s/p po /iv  vit k ,    no further occult bleeding , monitor for new episodes - ordered PRBC   heme consulted , recommendations -  ONLY if bleeding and when platelets <50K - transfuse 1-2 units platelets.      Macrocytic anemia   likely due to ETOH / liver disease     Hyponatremia /LASHONDA -s/p albumin x2   PO fluid restriction 1L daily    Continue to hold diuretics at this time   c/w Octreotide and increase Midodrine  f/u Nephro recs    reached out renal consult appreciated- recommend hospice discussion     Severe protein-calorie malnutrition secondary to liver cirrhosis    Adi ramos bilateral     Dispo: [facility] for dc planning

## 2022-09-16 NOTE — PROGRESS NOTE ADULT - CONVERSATION DETAILS
wishes to be full code
Met with patient along with [place]  from [facility]  Discussed overall diagnosis and poor prognosis related to his extensive liver cirrhosis. Discussed eligibility for hospice and explained at length the hospice at [facility] - He was receptive to hospice support at [facility]   Explained to him the philosophy of hospice and palliative care with focus on symptom management for quality of life - patient expressed understanding    Re-addressed code status- Discussed code status at length. Explained the difference between Do Not Resuscitate/Do Not Intubate versus CPR and Intubation. At the end of our conversation decision was made for CPR and Intubation (FULL CODE)    Re-addressed HCP as during prior encounter he refused to declare a person. Explained at length the importance of appointing someone in the event he cannot make health care decisions for himself.    Patient has family who live in [facility] but he doesn't know their contact number. He also expressed he has a close friend who lives nearby - at this time he does not want to complete HCP form. Informed him of the critical importance of having this form completed given his extensive disease - also informed him without family to contact then 2 providers may have to make end of life decisions for him if he loses capacity- he expressed acknowledgement and understanding
had [facility] discussion-he verbalizes understanding of the terminal nature of his illness and wishes for no CPR and signs his own DNR DNI DARIO with  and RN as witnesses   DARIO completed
spoke to patient with  at bedside- he is unable to verbalize understanding of his illness -   unable to repeat any information given to him-   states doesn't have any family that he wants us to contact- states doesn't have a wife or kids-  when asked about brothers and sisters declines   after discussing the whole diagnosis and prognosis with translatory- he's unable to repeat the information given to him   when asked about GOC and advanced directives- states "lord lindsey has already written what will happen"- states he wants to live and refuses to say anything about DNR DNI

## 2022-09-16 NOTE — BRIEF OPERATIVE NOTE - NSICDXBRIEFPROCEDURE_GEN_ALL_CORE_FT
PROCEDURES:  Paracentesis with ultrasound guidance 09-Sep-2022 14:05:43  Nahun Finn  
PROCEDURES:  Insertion, catheter, tunneled, peritoneal, percutaneous 16-Sep-2022 13:28:29  Nahun Finn

## 2022-09-17 NOTE — PROGRESS NOTE ADULT - ASSESSMENT
47y/oM with significant EtOH abuse history admitted for decompensated alcoholic cirrhosis, coagulopathy, had episode of epistaxis-now resolved.    started on spironolactone , Lasix , Cipro for SBP ppx , rifaximin ,  s/p 2900 cc fluid removal by IR 9/1.   is now s.p catheter placement on 9/16 -     Decompensated alcoholic cirrhosis w/ascites with ETOH /Hepato- renal syndrome/coagulopathy/thrombocytopenia  MRCP done showed cirrhosis with portal HTN and large ascites  s/p IR guided repeat  paracenteses 9/1 - r  cont lactulose, rifaximin   cont cipro 500mg qd indefinitely for ppx for SBP due to low total BF protein     hyperbilirubinemia- cholestyramine 4 bid    Worsening Renal failure- metabolic acidosis - Dr Singh following   not a candidate for HD due to ineligibility for liver transplant  Dr Young discussed case w/ Dr. Boggs over the phone at length -was declined [place] transfer  IR - Paracentesis done repeatedly, catheter now placed    Hypotension   c/w Midodrine     Coagulopathy with liver cirrhosis-monitor    dnr dni  prognosis grim

## 2022-09-17 NOTE — PROGRESS NOTE ADULT - SUBJECTIVE AND OBJECTIVE BOX
TODD SOC    [id number]    47y      Male    INTERVAL HPI/OVERNIGHT EVENTS: patient being seen for end stage liver disease and hepato renal. Patient seen at bedside and is in nad. patient is simple     Insertion, catheter, tunneled, peritoneal, percutaneous by IR on 9/16    REVIEW OF SYSTEMS:    CONSTITUTIONAL: No fever, weight loss, or fatigue  RESPIRATORY: No cough, wheezing, hemoptysis; No shortness of breath  CARDIOVASCULAR: No chest pain, palpitations  GASTROINTESTINAL: No abdominal or epigastric pain. No nausea, vomiting  NEUROLOGICAL: No headaches, memory loss, loss of strength.  MISCELLANEOUS:      Vital Signs Last 24 Hrs  T(C): 36.4 (17 Sep 2022 10:37), Max: 37 (16 Sep 2022 16:30)  T(F): 97.6 (17 Sep 2022 10:37), Max: 98.6 (16 Sep 2022 16:30)  HR: 75 (17 Sep 2022 10:37) (64 - 88)  BP: 106/71 (17 Sep 2022 10:37) (98/60 - 118/68)  BP(mean): --  RR: 18 (17 Sep 2022 10:37) (16 - 18)  SpO2: 100% (17 Sep 2022 10:37) (96% - 100%)    Parameters below as of 17 Sep 2022 10:37  Patient On (Oxygen Delivery Method): room air        PHYSICAL EXAM:    GENERAL: NAD, ill appearing, jaundiced   HEENT: scleral icterus  NECK: soft, Supple, No JVD,   CHEST/LUNG: Clear to auscultation bilaterally; No wheezing  HEART: S1S2+, Regular rate and rhythm; No murmurs,  ABDOMEN: Soft, catheter  EXTREMITIES:  2edema  SKIN: jaundiced  NEURO: AAOX3,    LABS:                        7.6    13.44 )-----------( 44       ( 17 Sep 2022 05:06 )             20.8     09-17    138  |  85<L>  |  139.4<H>  ----------------------------<  144<H>  3.5   |  28.0  |  5.40<H>    Ca    8.8      17 Sep 2022 05:06    TPro  4.6<L>  /  Alb  3.0<L>  /  TBili  43.9<H>  /  DBili  >10.0<H>  /  AST  129<H>  /  ALT  39  /  AlkPhos  236<H>  09-17    PT/INR - ( 16 Sep 2022 06:15 )   PT: 39.5 sec;   INR: 3.36 ratio         PTT - ( 16 Sep 2022 06:15 )  PTT:99.7 sec        MEDICATIONS  (STANDING):  Biotene Dry Mouth Oral Rinse 5 milliLiter(s) Swish and Spit every 4 hours  cholestyramine Powder (Sugar-Free) 4 Gram(s) Oral two times a day  ciprofloxacin     Tablet 500 milliGRAM(s) Oral daily  folic acid 1 milliGRAM(s) Oral daily  lactulose Syrup 10 Gram(s) Oral four times a day  lidocaine   4% Patch 1 Patch Transdermal every 24 hours  midodrine. 7.5 milliGRAM(s) Oral three times a day  pantoprazole    Tablet 40 milliGRAM(s) Oral before breakfast  rifAXIMin 550 milliGRAM(s) Oral two times a day  simethicone 80 milliGRAM(s) Chew daily  thiamine 100 milliGRAM(s) Oral daily    MEDICATIONS  (PRN):  aluminum hydroxide/magnesium hydroxide/simethicone Suspension 30 milliLiter(s) Oral every 4 hours PRN Dyspepsia  melatonin 3 milliGRAM(s) Oral at bedtime PRN Insomnia  morphine  - Injectable 2 milliGRAM(s) IV Push every 4 hours PRN Moderate Pain (4 - 6)  morphine  - Injectable 4 milliGRAM(s) IV Push every 4 hours PRN Severe Pain (7 - 10)  ondansetron Injectable 4 milliGRAM(s) IV Push every 8 hours PRN Nausea and/or Vomiting      RADIOLOGY & ADDITIONAL TESTS:

## 2022-09-18 NOTE — PROGRESS NOTE ADULT - SUBJECTIVE AND OBJECTIVE BOX
[facility]    [id number]    47y      Male    INTERVAL HPI/OVERNIGHT EVENTS: patient being seen for end stage liver disease. patient seen at bedside     REVIEW OF SYSTEMS:    CONSTITUTIONAL: No fever, weight loss, or fatigue  RESPIRATORY: No cough, wheezing, hemoptysis; No shortness of breath  CARDIOVASCULAR: No chest pain, palpitations  GASTROINTESTINAL: No abdominal or epigastric pain. No nausea, vomiting  NEUROLOGICAL: No headaches, memory loss, loss of strength.  MISCELLANEOUS:      Vital Signs Last 24 Hrs  T(C): 36.3 (18 Sep 2022 11:08), Max: 36.4 (17 Sep 2022 18:03)  T(F): 97.4 (18 Sep 2022 11:08), Max: 97.6 (17 Sep 2022 18:03)  HR: 74 (18 Sep 2022 11:08) (74 - 77)  BP: 100/59 (18 Sep 2022 11:08) (100/59 - 118/62)  BP(mean): --  RR: 18 (18 Sep 2022 11:08) (18 - 18)  SpO2: 93% (18 Sep 2022 11:08) (93% - 98%)    Parameters below as of 18 Sep 2022 11:08  Patient On (Oxygen Delivery Method): room air        PHYSICAL EXAM:  GENERAL: NAD, ill appearing, jaundiced   HEENT: scleral icterus  NECK: soft, Supple, No JVD,   CHEST/LUNG: Clear to auscultation bilaterally; No wheezing  HEART: S1S2+, Regular rate and rhythm; No murmurs,  ABDOMEN: Soft, catheter  EXTREMITIES:  pitting edema  SKIN: jaundiced  NEURO: AAOX1      LABS:                        7.6    13.44 )-----------( 44       ( 17 Sep 2022 05:06 )             20.8     09-17    138  |  85<L>  |  139.4<H>  ----------------------------<  144<H>  3.5   |  28.0  |  5.40<H>    Ca    8.8      17 Sep 2022 05:06    TPro  4.6<L>  /  Alb  3.0<L>  /  TBili  43.9<H>  /  DBili  >10.0<H>  /  AST  129<H>  /  ALT  39  /  AlkPhos  236<H>  09-17      MEDICATIONS  (STANDING):  Biotene Dry Mouth Oral Rinse 5 milliLiter(s) Swish and Spit every 4 hours  cholestyramine Powder (Sugar-Free) 4 Gram(s) Oral two times a day  ciprofloxacin     Tablet 500 milliGRAM(s) Oral daily  folic acid 1 milliGRAM(s) Oral daily  lactulose Syrup 10 Gram(s) Oral four times a day  lidocaine   4% Patch 1 Patch Transdermal every 24 hours  midodrine. 10 milliGRAM(s) Oral three times a day  pantoprazole    Tablet 40 milliGRAM(s) Oral before breakfast  rifAXIMin 550 milliGRAM(s) Oral two times a day  simethicone 80 milliGRAM(s) Chew daily  thiamine 100 milliGRAM(s) Oral daily    MEDICATIONS  (PRN):  aluminum hydroxide/magnesium hydroxide/simethicone Suspension 30 milliLiter(s) Oral every 4 hours PRN Dyspepsia  melatonin 3 milliGRAM(s) Oral at bedtime PRN Insomnia  morphine  - Injectable 2 milliGRAM(s) IV Push every 4 hours PRN Moderate Pain (4 - 6)  morphine  - Injectable 4 milliGRAM(s) IV Push every 4 hours PRN Severe Pain (7 - 10)  ondansetron Injectable 4 milliGRAM(s) IV Push every 8 hours PRN Nausea and/or Vomiting      RADIOLOGY & ADDITIONAL TESTS:

## 2022-09-18 NOTE — PROGRESS NOTE ADULT - ASSESSMENT
47y/oM with significant EtOH abuse history admitted for decompensated alcoholic cirrhosis, coagulopathy, had episode of epistaxis-now resolved.    started on spironolactone , Lasix , Cipro for SBP ppx , rifaximin ,  s/p 2900 cc fluid removal by IR 9/1.   is now s.p abd catheter placement on 9/16 -     Decompensated alcoholic cirrhosis w/ascites with ETOH /Hepato- renal syndrome/coagulopathy/thrombocytopenia  MRCP done showed cirrhosis with portal HTN and large ascites  s/p IR guided repeat  paracenteses 9/1 -   cont lactulose, rifaximin   cont cipro 500mg qd indefinitely for ppx for SBP due to low total BF protein     hyperbilirubinemia- cholestyramine 4 bid    Worsening Renal failure- metabolic acidosis - Dr Singh following   not a candidate for HD due to ineligibility for liver transplant  Dr Young discussed case w/ Dr. Boggs over the phone at length -was declined [place] transfer  IR - Paracentesis done repeatedly, catheter now placed    Hypotension   c/w Midodrine     Coagulopathy with liver cirrhosis-monitor    dnr dni  prognosis grim   dc to inpatient hospice?

## 2022-09-19 NOTE — PROGRESS NOTE ADULT - SUBJECTIVE AND OBJECTIVE BOX
[facility]    [id number]    47y      Male    INTERVAL HPI/OVERNIGHT EVENTS: patient being seen for end stage liver disease, anemia and acute on chronic renal failure.  patient seen at bedside and is in nad.    patient requiring 2 units prbcs today       REVIEW OF SYSTEMS:    unable to obtain secondary to mental status       Vital Signs Last 24 Hrs  T(C): 36.6 (19 Sep 2022 05:47), Max: 36.6 (19 Sep 2022 05:47)  T(F): 97.8 (19 Sep 2022 05:47), Max: 97.8 (19 Sep 2022 05:47)  HR: 70 (19 Sep 2022 09:59) (70 - 85)  BP: 80/47 (19 Sep 2022 09:59) (80/47 - 108/62)  BP(mean): --  RR: 16 (19 Sep 2022 09:59) (16 - 18)  SpO2: 98% (19 Sep 2022 09:59) (95% - 98%)    Parameters below as of 19 Sep 2022 09:59  Patient On (Oxygen Delivery Method): room air        PHYSICAL EXAM:    GENERAL: NAD, ill appearing, jaundiced   HEENT: scleral icterus  NECK: soft, Supple, No JVD,   CHEST/LUNG: Clear to auscultation bilaterally; No wheezing  HEART: S1S2+, Regular rate and rhythm; No murmurs,  ABDOMEN: Soft, catheter  EXTREMITIES:  pitting edema  SKIN: jaundiced  NEURO: AAOX1      LABS:                        5.6    15.95 )-----------( 48       ( 19 Sep 2022 06:12 )             15.8     09-19    134<L>  |  86<L>  |  162.8<H>  ----------------------------<  107<H>  4.2   |  25.0  |  5.77<H>    Ca    9.1      19 Sep 2022 06:12  Mg     2.7     09-19    TPro  4.5<L>  /  Alb  2.8<L>  /  TBili  45.1<H>  /  DBili  x   /  AST  135<H>  /  ALT  19  /  AlkPhos  243<H>  09-19            MEDICATIONS  (STANDING):  Biotene Dry Mouth Oral Rinse 5 milliLiter(s) Swish and Spit every 4 hours  cholestyramine Powder (Sugar-Free) 4 Gram(s) Oral two times a day  ciprofloxacin     Tablet 500 milliGRAM(s) Oral daily  folic acid 1 milliGRAM(s) Oral daily  lactulose Syrup 10 Gram(s) Oral four times a day  lidocaine   4% Patch 1 Patch Transdermal every 24 hours  midodrine. 10 milliGRAM(s) Oral three times a day  pantoprazole    Tablet 40 milliGRAM(s) Oral before breakfast  rifAXIMin 550 milliGRAM(s) Oral two times a day  simethicone 80 milliGRAM(s) Chew daily  thiamine 100 milliGRAM(s) Oral daily    MEDICATIONS  (PRN):  aluminum hydroxide/magnesium hydroxide/simethicone Suspension 30 milliLiter(s) Oral every 4 hours PRN Dyspepsia  melatonin 3 milliGRAM(s) Oral at bedtime PRN Insomnia  ondansetron Injectable 4 milliGRAM(s) IV Push every 8 hours PRN Nausea and/or Vomiting      RADIOLOGY & ADDITIONAL TESTS:

## 2022-09-19 NOTE — PROGRESS NOTE ADULT - ASSESSMENT
47y/oM with significant EtOH abuse history admitted for decompensated alcoholic cirrhosis, coagulopathy, had episode of epistaxis-now resolved.    started on spironolactone , Lasix , Cipro for SBP ppx , rifaximin ,  s/p 2900 cc fluid removal by IR 9/1.   is now s.p abd catheter placement on 9/16 -     Decompensated alcoholic cirrhosis w/ascites with ETOH /Hepato- renal syndrome/coagulopathy/thrombocytopenia  MRCP done showed cirrhosis with portal HTN and large ascites  s/p IR guided repeat  paracenteses 9/1 -   cont lactulose, rifaximin   cont cipro 500mg qd indefinitely for ppx for SBP due to low total BF protein     anemia - 2 units prbcs    hyperbilirubinemia- cholestyramine 4 bid    Worsening Renal failure- metabolic acidosis - Dr Singh following   not a candidate for HD due to ineligibility for liver transplant  Dr Young discussed case w/ Dr. Boggs over the phone at length -was declined [place] transfer  IR - Paracentesis done repeatedly, catheter now placed    Hypotension   c/w Midodrine     Coagulopathy with liver cirrhosis-monitor    dnr dni  prognosis grim   dc to inpatient hospice?

## 2022-09-19 NOTE — CHART NOTE - NSCHARTNOTEFT_GEN_A_CORE
Called by RN for concern for a fall, pt found lying on the floor next to his bed.  Uz[place] speaking, Gely Nursing supervisor interprets.  Pt states that the bed was too hot and he just laid on the floor to cool off.  NO h/o fall, head injury.  Pt has no other complaints  Exam wnl    Impression: 1- pt. self positioned on the floor  Plan;- no treatment required

## 2022-09-19 NOTE — CHART NOTE - NSCHARTNOTEFT_GEN_A_CORE
Ethics continues to follow. Discussed with LEIGH White MD (Medicine Attending) and AUGUST Fuentes LCSW (SW Manager).     Case with many social complexities.     Mona Alfaro MD  Ethics Attending  [phone]

## 2022-09-19 NOTE — PROGRESS NOTE ADULT - SUBJECTIVE AND OBJECTIVE BOX
Hemoglobin 5.6. Getting pRBC transfusion this afternoon. Complained of abdominal discomfort and "fluid" in abdomen.       Vital Signs Last 24 Hrs  T(C): 36.6 (19 Sep 2022 05:47), Max: 36.6 (19 Sep 2022 05:47)  T(F): 97.8 (19 Sep 2022 05:47), Max: 97.8 (19 Sep 2022 05:47)  HR: 71 (19 Sep 2022 15:51) (70 - 85)  BP: 103/56 (19 Sep 2022 15:51) (80/47 - 103/62)  BP(mean): --  RR: 18 (19 Sep 2022 15:51) (16 - 18)  SpO2: 95% (19 Sep 2022 15:51) (95% - 98%)    Parameters below as of 19 Sep 2022 15:51  Patient On (Oxygen Delivery Method): room air    I&O's Summary: Not recorded    Physical Exam  General: WDWN male in NAD  Cardiac: S1S2 RRR  Respiratory: CTAB  Abdomen: Distended, non tender  Extremities: No edema  Neuro: Awake, alert     09-19    134<L>  |  86<L>  |  162.8<H>  ----------------------------<  107<H>  4.2   |  25.0  |  5.77<H>    Ca    9.1      19 Sep 2022 06:12  Mg     2.7     09-19    TPro  4.5<L>  /  Alb  2.8<L>  /  TBili  45.1<H>  /  DBili  x   /  AST  135<H>  /  ALT  19  /  AlkPhos  243<H>  09-19                        5.6    15.95 )-----------( 48       ( 19 Sep 2022 06:12 )             15.8     MEDICATIONS  (STANDING):  Biotene Dry Mouth Oral Rinse 5 milliLiter(s) Swish and Spit every 4 hours  cholestyramine Powder (Sugar-Free) 4 Gram(s) Oral two times a day  ciprofloxacin     Tablet 500 milliGRAM(s) Oral daily  folic acid 1 milliGRAM(s) Oral daily  lactulose Syrup 10 Gram(s) Oral four times a day  lidocaine   4% Patch 1 Patch Transdermal every 24 hours  midodrine. 10 milliGRAM(s) Oral three times a day  pantoprazole    Tablet 40 milliGRAM(s) Oral before breakfast  rifAXIMin 550 milliGRAM(s) Oral two times a day  simethicone 80 milliGRAM(s) Chew daily  thiamine 100 milliGRAM(s) Oral daily    MEDICATIONS  (PRN):  aluminum hydroxide/magnesium hydroxide/simethicone Suspension 30 milliLiter(s) Oral every 4 hours PRN Dyspepsia  melatonin 3 milliGRAM(s) Oral at bedtime PRN Insomnia  ondansetron Injectable 4 milliGRAM(s) IV Push every 8 hours PRN Nausea and/or Vomiting

## 2022-09-19 NOTE — PROGRESS NOTE ADULT - ASSESSMENT
The patient is a 47 year old male with PMH of HTN, ? DVT (not on AC as per pt) and significant ETOH abuse resulting in cirrhosis complicated by ascites initially presented to [facility] for worsening abdominal distension; transferred to [facility] for decompensated alcoholic cirrhosis s/p course of steroids. Hospital course notable for epistaxis, coagulopathy, hyponatremia and LASHONDA.    Acute Kidney Injury, worsening   -Suspecting hepatorenal syndrome vs pigmented cast nephropathy   -Baseline creatinine is < 0.2mg/dL; latest creatinine is 5.7mg/dL  -UA negative blood, +protein (prior UA consistent with UTI; no other UA is available for comparison)  -UPCR 0.9g/g   -Urine sodium < 30  -Renal ultrasound - R kidney 12.9cm + L kidney 14.8cm - no obstruction   -s/p IV fluids + IV albumin + octreotide   -Remains on midodrine 10mg TID   -Renal function continues to decline despite above interventions  -Given that the patient is not a liver transplant candidate; renal replacement therapy will not improve his long term prognosis consequently not a dialysis candidate   -Prognosis is poor   -Transfusion parameters as per primary team     Dotty Sloan DO  Nephrology

## 2022-09-20 NOTE — CHART NOTE - NSCHARTNOTEFT_GEN_A_CORE
Notified by RN pt remains obtunded and hypotensive. Pt repeat BP 60/30.  Pt is s/p 500mL IVF bolus this evening for hypotension. Pt is on midodrine PO but has not received doses due to being obtunded.   Pt last Hgb noted to be 5.6 on 9/19. 2 units PRBC administered 9/19, will repeat CBC STAT.  Ordered albumin 25% IVF bolus and STAT CBC.  If BP without improvement s/p bolus and evaluation of H&H when resulted, will need to consult MICU.  Discussed with Dr. Millna and in agreement with plan.     Called by RN to report when she returned to pt bedside to administer albumin, pt was without pulse and respirations.

## 2022-09-20 NOTE — PROGRESS NOTE ADULT - PROVIDER SPECIALTY LIST ADULT
Gastroenterology
Hospitalist
Nephrology
Gastroenterology
Hospitalist
Internal Medicine
Internal Medicine
Nephrology
Palliative Care
Palliative Care
Gastroenterology
Gastroenterology
Hospitalist
Internal Medicine
Nephrology
Gastroenterology
Hospitalist
Hospitalist
Internal Medicine
Nephrology
Nephrology
Gastroenterology
Hospitalist
Gastroenterology
Hospitalist
Gastroenterology
Hospitalist

## 2022-09-20 NOTE — PROGRESS NOTE ADULT - REASON FOR ADMISSION
Cirrhosis 2/2 ETOH

## 2022-09-20 NOTE — PROGRESS NOTE ADULT - ASSESSMENT
The patient is a 47 year old male with PMH of HTN, ? DVT (not on AC as per pt) and significant ETOH abuse resulting in cirrhosis complicated by ascites initially presented to [facility] for worsening abdominal distension; transferred to [facility] for decompensated alcoholic cirrhosis s/p course of steroids. Hospital course notable for epistaxis, coagulopathy, hyponatremia and LASHONDA.    Acute Kidney Injury, worsening   -Suspecting hepatorenal syndrome vs pigmented cast nephropathy   -Baseline creatinine is < 0.2mg/dL; latest creatinine is 5.7mg/dL  -UA negative blood, +protein (prior UA consistent with UTI; no other UA is available for comparison)  -UPCR 0.9g/g   -Urine sodium < 30  -Renal ultrasound - R kidney 12.9cm + L kidney 14.8cm - no obstruction   -s/p IV fluids + IV albumin + octreotide   -Midodrine was increased from 10mg to 15mg TID   -Renal function continues to decline despite above interventions  -Given that the patient is not a liver transplant candidate; renal replacement therapy will not improve his long term prognosis consequently not a dialysis candidate   -Prognosis is poor   -Ethnics is following     Dotty Sloan DO  Nephrology

## 2022-09-20 NOTE — PROGRESS NOTE ADULT - SUBJECTIVE AND OBJECTIVE BOX
[facility]    [id number]    47y      Male    INTERVAL HPI/OVERNIGHT EVENTS: patient being seen for end stage liver disease and hepatorenal. Patient seen at bedside and is obtunded,    REVIEW OF SYSTEMS:    unable to obtain secondary to mental status     Vital Signs Last 24 Hrs  T(C): 36.3 (19 Sep 2022 21:24), Max: 36.4 (19 Sep 2022 20:30)  T(F): 97.3 (19 Sep 2022 21:24), Max: 97.5 (19 Sep 2022 20:30)  HR: 58 (20 Sep 2022 11:02) (58 - 72)  BP: 87/82 (20 Sep 2022 11:02) (87/82 - 103/56)  BP(mean): --  RR: 18 (19 Sep 2022 21:24) (14 - 18)  SpO2: 100% (20 Sep 2022 11:02) (95% - 100%)    Parameters below as of 20 Sep 2022 11:02  Patient On (Oxygen Delivery Method): room air        PHYSICAL EXAM:    GENERAL: obtunded  HEENT: scleral icterus  ABDOMEN: distended, firm        LABS:                        5.6    15.95 )-----------( 48       ( 19 Sep 2022 06:12 )             15.8     09-19    134<L>  |  86<L>  |  162.8<H>  ----------------------------<  107<H>  4.2   |  25.0  |  5.77<H>    Ca    9.1      19 Sep 2022 06:12  Mg     2.7     09-19    TPro  4.5<L>  /  Alb  2.8<L>  /  TBili  45.1<H>  /  DBili  x   /  AST  135<H>  /  ALT  19  /  AlkPhos  243<H>  09-19        MEDICATIONS  (STANDING):  Biotene Dry Mouth Oral Rinse 5 milliLiter(s) Swish and Spit every 4 hours  cholestyramine Powder (Sugar-Free) 4 Gram(s) Oral two times a day  ciprofloxacin     Tablet 500 milliGRAM(s) Oral daily  folic acid 1 milliGRAM(s) Oral daily  lactulose Retention Enema 200 Gram(s) Rectal every 12 hours  lidocaine   4% Patch 1 Patch Transdermal every 24 hours  midodrine. 15 milliGRAM(s) Oral three times a day  pantoprazole    Tablet 40 milliGRAM(s) Oral before breakfast  rifAXIMin 550 milliGRAM(s) Oral two times a day  simethicone 80 milliGRAM(s) Chew daily  thiamine 100 milliGRAM(s) Oral daily    MEDICATIONS  (PRN):  aluminum hydroxide/magnesium hydroxide/simethicone Suspension 30 milliLiter(s) Oral every 4 hours PRN Dyspepsia  melatonin 3 milliGRAM(s) Oral at bedtime PRN Insomnia  ondansetron Injectable 4 milliGRAM(s) IV Push every 8 hours PRN Nausea and/or Vomiting      RADIOLOGY & ADDITIONAL TESTS:

## 2022-09-20 NOTE — PROGRESS NOTE ADULT - ASSESSMENT
47y/oM with significant EtOH abuse history admitted for decompensated alcoholic cirrhosis, coagulopathy, had episode of epistaxis-now resolved.    started on spironolactone , Lasix , Cipro for SBP ppx , rifaximin ,  s/p 2900 cc fluid removal by IR 9/1.   is now s.p abd catheter placement on 9/16 -     Decompensated alcoholic cirrhosis w/ascites with ETOH /Hepato- renal syndrome/coagulopathy/thrombocytopenia  MRCP done showed cirrhosis with portal HTN and large ascites  worseninf  4600cc removed from catheter    anemia - transfuse prn     Worsening Renal failure- metabolic acidosis - Dr Singh following   not a candidate for HD due to ineligibility for liver transplant  Dr Young discussed case w/ Dr. Boggs over the phone at length -was declined [place] transfer  IR - Paracentesis done repeatedly, catheter now placed    Hypotension   c/w Midodrine     Coagulopathy with liver cirrhosis-monitor    dnr dni  prognosis grim   spoke to inpatient Dr at the [facility].   change lactulsoe to per rectum

## 2022-09-20 NOTE — PROGRESS NOTE ADULT - SUBJECTIVE AND OBJECTIVE BOX
Was transfused 2u pRBC yesterday.     Vital Signs Last 24 Hrs  T(C): 36.3 (19 Sep 2022 21:24), Max: 36.4 (19 Sep 2022 20:30)  T(F): 97.3 (19 Sep 2022 21:24), Max: 97.5 (19 Sep 2022 20:30)  HR: 58 (20 Sep 2022 11:02) (58 - 72)  BP: 87/82 (20 Sep 2022 11:02) (87/82 - 103/56)  BP(mean): --  RR: 18 (19 Sep 2022 21:24) (14 - 18)  SpO2: 100% (20 Sep 2022 11:02) (95% - 100%)    Parameters below as of 20 Sep 2022 11:02  Patient On (Oxygen Delivery Method): room air    I&O's Summary: Not recorded     Physical Exam  General: WDWN male in NAD  Cardiac: S1S2 RRR  Respiratory: CTAB  Abdomen: Distended, non tender  Extremities: No edema  Neuro: Sleeping     09-19    134<L>  |  86<L>  |  162.8<H>  ----------------------------<  107<H>  4.2   |  25.0  |  5.77<H>    Ca    9.1      19 Sep 2022 06:12  Mg     2.7     09-19    TPro  4.5<L>  /  Alb  2.8<L>  /  TBili  45.1<H>  /  DBili  x   /  AST  135<H>  /  ALT  19  /  AlkPhos  243<H>  09-19                        5.6    15.95 )-----------( 48       ( 19 Sep 2022 06:12 )             15.8     MEDICATIONS  (STANDING):  Biotene Dry Mouth Oral Rinse 5 milliLiter(s) Swish and Spit every 4 hours  cholestyramine Powder (Sugar-Free) 4 Gram(s) Oral two times a day  ciprofloxacin     Tablet 500 milliGRAM(s) Oral daily  folic acid 1 milliGRAM(s) Oral daily  lactulose Syrup 10 Gram(s) Oral four times a day  lidocaine   4% Patch 1 Patch Transdermal every 24 hours  midodrine. 15 milliGRAM(s) Oral three times a day  pantoprazole    Tablet 40 milliGRAM(s) Oral before breakfast  rifAXIMin 550 milliGRAM(s) Oral two times a day  simethicone 80 milliGRAM(s) Chew daily  thiamine 100 milliGRAM(s) Oral daily    MEDICATIONS  (PRN):  aluminum hydroxide/magnesium hydroxide/simethicone Suspension 30 milliLiter(s) Oral every 4 hours PRN Dyspepsia  melatonin 3 milliGRAM(s) Oral at bedtime PRN Insomnia  ondansetron Injectable 4 milliGRAM(s) IV Push every 8 hours PRN Nausea and/or Vomiting

## 2022-09-20 NOTE — PROGRESS NOTE ADULT - NUTRITIONAL ASSESSMENT
This patient has been assessed with a concern for Malnutrition and has been determined to have a diagnosis/diagnoses of Severe protein-calorie malnutrition.    This patient is being managed with:   Diet Regular-  60 gm Protein (PRO60G)  1000mL Fluid Restriction ([id number])  Entered: Aug 25 2022  2:14PM    
This patient has been assessed with a concern for Malnutrition and has been determined to have a diagnosis/diagnoses of Severe protein-calorie malnutrition.    This patient is being managed with:   Diet Regular-  60 gm Protein (PRO60G)  1000mL Fluid Restriction ([id number])  Entered: Aug 25 2022  2:14PM    
This patient has been assessed with a concern for Malnutrition and has been determined to have a diagnosis/diagnoses of Severe protein-calorie malnutrition.    This patient is being managed with:   Diet Regular-  60 gm Protein (PRO60G)  1000mL Fluid Restriction ([id number])  Entered: Aug 25 2022  2:14PM    
This patient has been assessed with a concern for Malnutrition and has been determined to have a diagnosis/diagnoses of Severe protein-calorie malnutrition.    This patient is being managed with:   Diet Regular-  60 gm Protein (PRO60G)  1000mL Fluid Restriction ([id number])  Entered: Aug 25 2022  2:14PM    
This patient has been assessed with a concern for Malnutrition and has been determined to have a diagnosis/diagnoses of Severe protein-calorie malnutrition.    This patient is being managed with:   Diet Regular-  60 gm Protein (PRO60G)  1000mL Fluid Restriction ([id number])  Entered: Aug 25 2022  2:14PM    
This patient has been assessed with a concern for Malnutrition and has been determined to have a diagnosis/diagnoses of Severe protein-calorie malnutrition.    This patient is being managed with:   Diet Regular-  60 gm Protein (PRO60G)  1000mL Fluid Restriction ([id number])  Entered: Aug 25 2022  2:14PM    
This patient has been assessed with a concern for Malnutrition and has been determined to have a diagnosis/diagnoses of Severe protein-calorie malnutrition.    This patient is being managed with:   Diet Regular-  60 gm Protein (PRO60G)  1000mL Fluid Restriction ([id number])  Entered: Aug 25 2022  2:14PM    
This patient has been assessed with a concern for Malnutrition and has been determined to have a diagnosis/diagnoses of Severe protein-calorie malnutrition.    This patient is being managed with:   Diet Regular-  60 gm Protein (PRO60G)  1000mL Fluid Restriction ([id number])  Entered: Aug 25 2022  2:14PM    
This patient has been assessed with a concern for Malnutrition and has been determined to have a diagnosis/diagnoses of Severe protein-calorie malnutrition.    This patient is being managed with:   Diet NPO after Midnight-     NPO Start Date: 15-Sep-2022   NPO Start Time: 23:59  Entered: Sep 15 2022 12:08PM    Diet Regular-  60 gm Protein (PRO60G)  1000mL Fluid Restriction ([id number])  Entered: Aug 25 2022  2:14PM    
This patient has been assessed with a concern for Malnutrition and has been determined to have a diagnosis/diagnoses of Severe protein-calorie malnutrition.    This patient is being managed with:   Diet Regular-  60 gm Protein (PRO60G)  1000mL Fluid Restriction ([id number])  Entered: Aug 25 2022  2:14PM    
This patient has been assessed with a concern for Malnutrition and has been determined to have a diagnosis/diagnoses of Severe protein-calorie malnutrition.    This patient is being managed with:   Diet Regular-  60 gm Protein (PRO60G)  1000mL Fluid Restriction ([id number])  Entered: Aug 25 2022  2:14PM    
This patient has been assessed with a concern for Malnutrition and has been determined to have a diagnosis/diagnoses of Severe protein-calorie malnutrition.    This patient is being managed with:   Diet Regular-  60 gm Protein (PRO60G)  1000mL Fluid Restriction ([id number])  Entered: Aug 25 2022  2:14PM    
This patient has been assessed with a concern for Malnutrition and has been determined to have a diagnosis/diagnoses of Severe protein-calorie malnutrition.    This patient is being managed with:   Diet Regular-  60 gm Protein (PRO60G)  1000mL Fluid Restriction ([id number])  Entered: Aug 25 2022  2:14PM    
This patient has been assessed with a concern for Malnutrition and has been determined to have a diagnosis/diagnoses of Severe protein-calorie malnutrition.    This patient is being managed with:   Diet Regular-  60 gm Protein (PRO60G)  1000mL Fluid Restriction ([id number])  Entered: Aug 25 2022  2:14PM    
This patient has been assessed with a concern for Malnutrition and has been determined to have a diagnosis/diagnoses of Severe protein-calorie malnutrition.    This patient is being managed with:   Diet Regular-  60 gm Protein (PRO60G)  1000mL Fluid Restriction ([id number])  Entered: Aug 25 2022  2:14PM    
This patient has been assessed with a concern for Malnutrition and has been determined to have a diagnosis/diagnoses of Severe protein-calorie malnutrition.    This patient is being managed with:   Diet Regular-  60 gm Protein (PRO60G)  1000mL Fluid Restriction ([id number])  Entered: Aug 25 2022  2:14PM    
This patient has been assessed with a concern for Malnutrition and has been determined to have a diagnosis/diagnoses of Severe protein-calorie malnutrition.    This patient is being managed with:   Diet Regular-  60 gm Protein (PRO60G)  1000mL Fluid Restriction ([id number])  Entered: Aug 25 2022  2:14PM    
This patient has been assessed with a concern for Malnutrition and has been determined to have a diagnosis/diagnoses of Severe protein-calorie malnutrition.    This patient is being managed with:   Diet Regular-  60 gm Protein (PRO60G)  1000mL Fluid Restriction ([id number])  Entered: Aug 25 2022  2:14PM    
This patient has been assessed with a concern for Malnutrition and has been determined to have a diagnosis/diagnoses of Severe protein-calorie malnutrition.    This patient is being managed with:   Diet Regular-  60 gm Protein (PRO60G)  1000mL Fluid Restriction ([id number])  Entered: Aug 25 2022  2:14PM    
This patient has been assessed with a concern for Malnutrition and has been determined to have a diagnosis/diagnoses of Severe protein-calorie malnutrition.    This patient is being managed with:   Diet Regular-  60 gm Protein (PRO60G)  1000mL Fluid Restriction ([id number])  Entered: Aug 25 2022  2:14PM    
This patient has been assessed with a concern for Malnutrition and has been determined to have a diagnosis/diagnoses of Severe protein-calorie malnutrition.    This patient is being managed with:   Diet Regular-  60 gm Protein (PRO60G)  1000mL Fluid Restriction ([id number])  Entered: Aug 25 2022  2:14PM    
This patient has been assessed with a concern for Malnutrition and has been determined to have a diagnosis/diagnoses of Severe protein-calorie malnutrition.    This patient is being managed with:   Diet Regular-  60 gm Protein (PRO60G)  1000mL Fluid Restriction ([id number])  Entered: Aug 25 2022  2:14PM    
This patient has been assessed with a concern for Malnutrition and has been determined to have a diagnosis/diagnoses of Severe protein-calorie malnutrition.    This patient is being managed with:   Diet Regular-  60 gm Protein (PRO60G)  1000mL Fluid Restriction ([id number])  Entered: Aug 25 2022  2:14PM    
This patient has been assessed with a concern for Malnutrition and has been determined to have a diagnosis/diagnoses of Severe protein-calorie malnutrition.    This patient is being managed with:   Diet Regular-  60 gm Protein (PRO60G)  1000mL Fluid Restriction ([id number])  Entered: Aug 25 2022  2:14PM    
This patient has been assessed with a concern for Malnutrition and has been determined to have a diagnosis/diagnoses of Severe protein-calorie malnutrition.    This patient is being managed with:   Diet Regular-  60 gm Protein (PRO60G)  1000mL Fluid Restriction ([id number])  Entered: Aug 25 2022  2:14PM    
This patient has been assessed with a concern for Malnutrition and has been determined to have a diagnosis/diagnoses of Severe protein-calorie malnutrition.    This patient is being managed with:   Diet Regular-  60 gm Protein (PRO60G)  1000mL Fluid Restriction ([id number])  Entered: Aug 25 2022  2:14PM

## 2022-09-20 NOTE — CHART NOTE - NSCHARTNOTESELECT_GEN_ALL_CORE
Event Note
IR note
IR note
ETHICS/Event Note
ETHICS/Event Note
Event Note
Event Note
IR note
Nutrition Services

## 2022-09-21 LAB
CULTURE RESULTS: SIGNIFICANT CHANGE UP
SPECIMEN SOURCE: SIGNIFICANT CHANGE UP

## 2023-01-19 NOTE — PATIENT PROFILE ADULT - FALL HARM RISK - ATTEMPT OOB
Patient is in the supine position.   The body was positioned using the following devices: safety strap and blanket.  The patient was positioned by Vashti Montiel RN No